# Patient Record
Sex: FEMALE | Race: BLACK OR AFRICAN AMERICAN | NOT HISPANIC OR LATINO | Employment: OTHER | URBAN - METROPOLITAN AREA
[De-identification: names, ages, dates, MRNs, and addresses within clinical notes are randomized per-mention and may not be internally consistent; named-entity substitution may affect disease eponyms.]

---

## 2017-01-09 RX ORDER — HYDROCHLOROTHIAZIDE 25 MG/1
TABLET ORAL
Qty: 90 TABLET | Refills: 0 | Status: SHIPPED | OUTPATIENT
Start: 2017-01-09 | End: 2017-05-23 | Stop reason: SDUPTHER

## 2017-01-09 RX ORDER — AMLODIPINE BESYLATE 5 MG/1
TABLET ORAL
Qty: 90 TABLET | Refills: 0 | Status: SHIPPED | OUTPATIENT
Start: 2017-01-09 | End: 2017-05-23 | Stop reason: SDUPTHER

## 2017-01-13 ENCOUNTER — CLINICAL SUPPORT (OUTPATIENT)
Dept: INTERNAL MEDICINE | Facility: CLINIC | Age: 72
End: 2017-01-13

## 2017-01-13 DIAGNOSIS — Z23 NEED FOR INFLUENZA VACCINATION: Primary | ICD-10-CM

## 2017-01-13 PROCEDURE — 90662 IIV NO PRSV INCREASED AG IM: CPT | Performed by: INTERNAL MEDICINE

## 2017-01-13 PROCEDURE — G0008 ADMIN INFLUENZA VIRUS VAC: HCPCS | Performed by: INTERNAL MEDICINE

## 2017-02-23 ENCOUNTER — LAB (OUTPATIENT)
Dept: INTERNAL MEDICINE | Facility: CLINIC | Age: 72
End: 2017-02-23

## 2017-02-23 DIAGNOSIS — I10 ESSENTIAL HYPERTENSION, MALIGNANT: ICD-10-CM

## 2017-02-23 DIAGNOSIS — R73.09 IMPAIRED GLUCOSE TOLERANCE TEST: ICD-10-CM

## 2017-02-23 DIAGNOSIS — E78.5 HYPERLIPIDEMIA, UNSPECIFIED HYPERLIPIDEMIA TYPE: Primary | ICD-10-CM

## 2017-02-23 LAB
25(OH)D3 SERPL-MCNC: 44.3 NG/ML
ALBUMIN SERPL-MCNC: 4.2 G/DL (ref 3.2–4.8)
ALBUMIN/GLOB SERPL: 1.4 G/DL (ref 1.5–2.5)
ALP SERPL-CCNC: 55 U/L (ref 25–100)
ALT SERPL W P-5'-P-CCNC: 24 U/L (ref 7–40)
ANION GAP SERPL CALCULATED.3IONS-SCNC: 3 MMOL/L (ref 3–11)
ARTICHOKE IGE QN: 118 MG/DL (ref 0–130)
AST SERPL-CCNC: 19 U/L (ref 0–33)
BILIRUB SERPL-MCNC: 0.4 MG/DL (ref 0.3–1.2)
BUN BLD-MCNC: 17 MG/DL (ref 9–23)
BUN/CREAT SERPL: 18.9 (ref 7–25)
CALCIUM SPEC-SCNC: 9.8 MG/DL (ref 8.7–10.4)
CHLORIDE SERPL-SCNC: 103 MMOL/L (ref 99–109)
CHOLEST SERPL-MCNC: 183 MG/DL (ref 0–200)
CO2 SERPL-SCNC: 33 MMOL/L (ref 20–31)
CREAT BLD-MCNC: 0.9 MG/DL (ref 0.6–1.3)
DEPRECATED RDW RBC AUTO: 48.5 FL (ref 37–54)
ERYTHROCYTE [DISTWIDTH] IN BLOOD BY AUTOMATED COUNT: 16 % (ref 11.3–14.5)
GFR SERPL CREATININE-BSD FRML MDRD: 75 ML/MIN/1.73
GLOBULIN UR ELPH-MCNC: 2.9 GM/DL
GLUCOSE BLD-MCNC: 98 MG/DL (ref 70–100)
HBA1C MFR BLD: 6 % (ref 4.8–5.6)
HCT VFR BLD AUTO: 40.9 % (ref 34.5–44)
HDLC SERPL-MCNC: 60 MG/DL (ref 40–60)
HGB BLD-MCNC: 13.2 G/DL (ref 11.5–15.5)
MCH RBC QN AUTO: 26.8 PG (ref 27–31)
MCHC RBC AUTO-ENTMCNC: 32.3 G/DL (ref 32–36)
MCV RBC AUTO: 83 FL (ref 80–99)
PLATELET # BLD AUTO: 370 10*3/MM3 (ref 150–450)
PMV BLD AUTO: 8.9 FL (ref 6–12)
POTASSIUM BLD-SCNC: 3.9 MMOL/L (ref 3.5–5.5)
PROT SERPL-MCNC: 7.1 G/DL (ref 5.7–8.2)
RBC # BLD AUTO: 4.93 10*6/MM3 (ref 3.89–5.14)
SODIUM BLD-SCNC: 139 MMOL/L (ref 132–146)
TRIGL SERPL-MCNC: 53 MG/DL (ref 0–150)
WBC NRBC COR # BLD: 5.57 10*3/MM3 (ref 3.5–10.8)

## 2017-02-23 PROCEDURE — 82306 VITAMIN D 25 HYDROXY: CPT | Performed by: INTERNAL MEDICINE

## 2017-02-23 PROCEDURE — 80061 LIPID PANEL: CPT | Performed by: INTERNAL MEDICINE

## 2017-02-23 PROCEDURE — 83036 HEMOGLOBIN GLYCOSYLATED A1C: CPT | Performed by: INTERNAL MEDICINE

## 2017-02-23 PROCEDURE — 85027 COMPLETE CBC AUTOMATED: CPT | Performed by: INTERNAL MEDICINE

## 2017-02-23 PROCEDURE — 36415 COLL VENOUS BLD VENIPUNCTURE: CPT | Performed by: INTERNAL MEDICINE

## 2017-02-23 PROCEDURE — 80053 COMPREHEN METABOLIC PANEL: CPT | Performed by: INTERNAL MEDICINE

## 2017-02-28 ENCOUNTER — OFFICE VISIT (OUTPATIENT)
Dept: INTERNAL MEDICINE | Facility: CLINIC | Age: 72
End: 2017-02-28

## 2017-02-28 VITALS
HEART RATE: 60 BPM | SYSTOLIC BLOOD PRESSURE: 140 MMHG | HEIGHT: 62 IN | WEIGHT: 145 LBS | DIASTOLIC BLOOD PRESSURE: 70 MMHG | BODY MASS INDEX: 26.68 KG/M2

## 2017-02-28 DIAGNOSIS — E78.49 OTHER HYPERLIPIDEMIA: Primary | ICD-10-CM

## 2017-02-28 DIAGNOSIS — K21.9 GASTROESOPHAGEAL REFLUX DISEASE WITHOUT ESOPHAGITIS: ICD-10-CM

## 2017-02-28 DIAGNOSIS — R73.02 IMPAIRED GLUCOSE TOLERANCE: ICD-10-CM

## 2017-02-28 DIAGNOSIS — M81.0 OSTEOPOROSIS: ICD-10-CM

## 2017-02-28 DIAGNOSIS — G47.09 OTHER INSOMNIA: ICD-10-CM

## 2017-02-28 PROCEDURE — 99214 OFFICE O/P EST MOD 30 MIN: CPT | Performed by: INTERNAL MEDICINE

## 2017-02-28 RX ORDER — TRAZODONE HYDROCHLORIDE 50 MG/1
50 TABLET ORAL NIGHTLY PRN
Qty: 30 TABLET | Refills: 5 | Status: SHIPPED | OUTPATIENT
Start: 2017-02-28 | End: 2018-12-13

## 2017-02-28 RX ORDER — TRAZODONE HYDROCHLORIDE 50 MG/1
50 TABLET ORAL NIGHTLY PRN
Qty: 30 TABLET | Refills: 5 | Status: SHIPPED | OUTPATIENT
Start: 2017-02-28 | End: 2017-02-28 | Stop reason: SDUPTHER

## 2017-02-28 RX ORDER — MECLIZINE HYDROCHLORIDE 25 MG/1
TABLET ORAL
Refills: 0 | COMMUNITY
Start: 2017-01-31 | End: 2017-08-31

## 2017-02-28 NOTE — PROGRESS NOTES
Patient is a 71 y.o. female who is here for a follow up of chronic conditions.  Chief Complaint   Patient presents with   • Hypertension   • Hyperlipidemia         HPI:  Here for f/u.  Had a bout of dizziness that lasted about one day.  Onset after URI.  BP has been good.  No HA's.  No abdominal pains.  Energy level is good.  No ankle edema.  Sleep is not the best.  GERD is under control.     History:    Patient Active Problem List   Diagnosis   • Gastroesophageal reflux disease without esophagitis   • Impaired glucose tolerance   • Hyperlipidemia   • Hypertension   • Osteoporosis   • Asteatosis cutis   • Cough       No past medical history on file.    Past Surgical History   Procedure Laterality Date   • Hysterectomy  11/1993     and REG       Current Outpatient Prescriptions on File Prior to Visit   Medication Sig   • amLODIPine (NORVASC) 5 MG tablet TAKE 1 TABLET DAILY   • ammonium lactate (LAC-HYDRIN) 12 % lotion Apply  topically.   • aspirin 81 MG tablet Take  by mouth daily.   • hydrochlorothiazide (HYDRODIURIL) 25 MG tablet TAKE 1 TABLET DAILY   • omeprazole (PriLOSEC) 40 MG capsule daily.   • pravastatin (PRAVACHOL) 40 MG tablet TAKE 1 TABLET DAILY   • [DISCONTINUED] azithromycin (ZITHROMAX Z-CANDY) 250 MG tablet Take 2 tablets the first day, then 1 tablet daily for 4 days.   • [DISCONTINUED] benzonatate (TESSALON) 200 MG capsule Take 1 capsule by mouth 3 (Three) Times a Day As Needed for cough.     No current facility-administered medications on file prior to visit.        Family History   Problem Relation Age of Onset   • Diabetes Other    • Kidney disease Other    • Hypertension Other        Social History     Social History   • Marital status:      Spouse name: N/A   • Number of children: N/A   • Years of education: N/A     Occupational History   • Not on file.     Social History Main Topics   • Smoking status: Never Smoker   • Smokeless tobacco: Not on file   • Alcohol use Yes   • Drug use: Not on  "file   • Sexual activity: Not on file     Other Topics Concern   • Not on file     Social History Narrative         ROS:    Review of Systems   Constitutional: Negative for chills, diaphoresis, fatigue, fever and unexpected weight change.   HENT: Negative for congestion, ear pain, hearing loss, nosebleeds, postnasal drip, rhinorrhea, sinus pressure and sore throat.    Eyes: Negative for pain, discharge and itching.   Respiratory: Negative for cough, chest tightness, shortness of breath and wheezing.    Cardiovascular: Negative for chest pain, palpitations and leg swelling.   Gastrointestinal: Negative for abdominal distention, abdominal pain, blood in stool, constipation, diarrhea, nausea and vomiting.        1/13 colonoscopy normal   Endocrine: Negative for polydipsia and polyuria.   Genitourinary: Negative for difficulty urinating, dysuria, frequency and hematuria.        11/15 mammogram and followed by GYN   Musculoskeletal: Negative for arthralgias, back pain, gait problem, joint swelling and myalgias.   Skin: Negative for rash and wound.   Neurological: Positive for light-headedness. Negative for dizziness, syncope, weakness and headaches.   Psychiatric/Behavioral: Negative for dysphoric mood and sleep disturbance. The patient is not nervous/anxious.        Visit Vitals   • /70   • Pulse 60   • Ht 61.5\" (156.2 cm)   • Wt 145 lb (65.8 kg)   • BMI 26.95 kg/m2       Physical Exam:    Physical Exam   Constitutional: She is oriented to person, place, and time. She appears well-developed and well-nourished.   HENT:   Head: Normocephalic and atraumatic.   Right Ear: External ear normal.   Left Ear: External ear normal.   Mouth/Throat: Oropharynx is clear and moist.   Eyes: Conjunctivae and EOM are normal.   Neck: Normal range of motion. Neck supple.   Cardiovascular: Normal rate, regular rhythm and normal heart sounds.    Pulmonary/Chest: Effort normal and breath sounds normal.   Abdominal: Soft. Bowel sounds " are normal.   Musculoskeletal: Normal range of motion.   Lymphadenopathy:     She has no cervical adenopathy.   Neurological: She is alert and oriented to person, place, and time.   Skin: Skin is warm and dry.   Psychiatric: She has a normal mood and affect. Her behavior is normal. Thought content normal.       Procedure:      Discussion/Summary:  htn-stable  hyperlipidemia-labs noted  gerd-stable  glucose intolerance-labs reviewed, watch carbs  osteoporosis-dc calcium and cont vit D exercise  vaginal bleeding-resolved  xerosis-lachydrin prn  Insomnia-trazodone prn      labs noted and dw patient,      Current Outpatient Prescriptions:   •  amLODIPine (NORVASC) 5 MG tablet, TAKE 1 TABLET DAILY, Disp: 90 tablet, Rfl: 0  •  ammonium lactate (LAC-HYDRIN) 12 % lotion, Apply  topically., Disp: , Rfl:   •  aspirin 81 MG tablet, Take  by mouth daily., Disp: , Rfl:   •  hydrochlorothiazide (HYDRODIURIL) 25 MG tablet, TAKE 1 TABLET DAILY, Disp: 90 tablet, Rfl: 0  •  meclizine (ANTIVERT) 25 MG tablet, TK 1 T PO QID FOR 5 DAYS, Disp: , Rfl: 0  •  omeprazole (PriLOSEC) 40 MG capsule, daily., Disp: , Rfl:   •  pravastatin (PRAVACHOL) 40 MG tablet, TAKE 1 TABLET DAILY, Disp: 90 tablet, Rfl: 2  •  traZODone (DESYREL) 50 MG tablet, Take 1 tablet by mouth At Night As Needed for sleep., Disp: 30 tablet, Rfl: 5        Marleni was seen today for hypertension and hyperlipidemia.    Diagnoses and all orders for this visit:    Other hyperlipidemia    Gastroesophageal reflux disease without esophagitis    Impaired glucose tolerance    Osteoporosis    Other insomnia  -     Discontinue: traZODone (DESYREL) 50 MG tablet; Take 1 tablet by mouth At Night As Needed for sleep.  -     traZODone (DESYREL) 50 MG tablet; Take 1 tablet by mouth At Night As Needed for sleep.

## 2017-03-10 RX ORDER — BENZONATATE 200 MG/1
200 CAPSULE ORAL 3 TIMES DAILY PRN
Qty: 30 CAPSULE | Refills: 1 | Status: SHIPPED | OUTPATIENT
Start: 2017-03-10 | End: 2017-08-31

## 2017-03-10 RX ORDER — AZITHROMYCIN 250 MG/1
TABLET, FILM COATED ORAL
Qty: 6 TABLET | Refills: 0 | Status: SHIPPED | OUTPATIENT
Start: 2017-03-10 | End: 2017-08-31

## 2017-05-23 RX ORDER — AMLODIPINE BESYLATE 5 MG/1
5 TABLET ORAL DAILY
Qty: 14 TABLET | Refills: 0 | Status: SHIPPED | OUTPATIENT
Start: 2017-05-23 | End: 2017-05-23 | Stop reason: SDUPTHER

## 2017-05-23 RX ORDER — HYDROCHLOROTHIAZIDE 25 MG/1
25 TABLET ORAL DAILY
Qty: 90 TABLET | Refills: 2 | Status: SHIPPED | OUTPATIENT
Start: 2017-05-23 | End: 2018-02-18 | Stop reason: SDUPTHER

## 2017-05-23 RX ORDER — HYDROCHLOROTHIAZIDE 25 MG/1
25 TABLET ORAL DAILY
Qty: 14 TABLET | Refills: 0 | Status: SHIPPED | OUTPATIENT
Start: 2017-05-23 | End: 2017-05-23 | Stop reason: SDUPTHER

## 2017-05-23 RX ORDER — AMLODIPINE BESYLATE 5 MG/1
5 TABLET ORAL DAILY
Qty: 90 TABLET | Refills: 2 | Status: SHIPPED | OUTPATIENT
Start: 2017-05-23 | End: 2018-02-18 | Stop reason: SDUPTHER

## 2017-07-10 RX ORDER — OMEPRAZOLE 40 MG/1
CAPSULE, DELAYED RELEASE ORAL
Qty: 90 CAPSULE | Refills: 1 | Status: SHIPPED | OUTPATIENT
Start: 2017-07-10 | End: 2019-04-18 | Stop reason: SDUPTHER

## 2017-08-31 ENCOUNTER — OFFICE VISIT (OUTPATIENT)
Dept: INTERNAL MEDICINE | Facility: CLINIC | Age: 72
End: 2017-08-31

## 2017-08-31 VITALS
DIASTOLIC BLOOD PRESSURE: 70 MMHG | WEIGHT: 140 LBS | SYSTOLIC BLOOD PRESSURE: 120 MMHG | BODY MASS INDEX: 25.76 KG/M2 | HEART RATE: 68 BPM | HEIGHT: 62 IN

## 2017-08-31 DIAGNOSIS — E78.00 PURE HYPERCHOLESTEROLEMIA: ICD-10-CM

## 2017-08-31 DIAGNOSIS — Z00.00 ROUTINE GENERAL MEDICAL EXAMINATION AT A HEALTH CARE FACILITY: ICD-10-CM

## 2017-08-31 DIAGNOSIS — R73.02 IMPAIRED GLUCOSE TOLERANCE: ICD-10-CM

## 2017-08-31 DIAGNOSIS — I10 ESSENTIAL HYPERTENSION: ICD-10-CM

## 2017-08-31 DIAGNOSIS — M81.0 OSTEOPOROSIS: ICD-10-CM

## 2017-08-31 DIAGNOSIS — K21.9 GASTROESOPHAGEAL REFLUX DISEASE WITHOUT ESOPHAGITIS: Primary | ICD-10-CM

## 2017-08-31 LAB
25(OH)D3 SERPL-MCNC: 32.5 NG/ML
ALBUMIN SERPL-MCNC: 4.2 G/DL (ref 3.2–4.8)
ALBUMIN/GLOB SERPL: 1.4 G/DL (ref 1.5–2.5)
ALP SERPL-CCNC: 66 U/L (ref 25–100)
ALT SERPL W P-5'-P-CCNC: 22 U/L (ref 7–40)
ANION GAP SERPL CALCULATED.3IONS-SCNC: 7 MMOL/L (ref 3–11)
ARTICHOKE IGE QN: 111 MG/DL (ref 0–130)
AST SERPL-CCNC: 21 U/L (ref 0–33)
BILIRUB SERPL-MCNC: 0.5 MG/DL (ref 0.3–1.2)
BUN BLD-MCNC: 13 MG/DL (ref 9–23)
BUN/CREAT SERPL: 14.4 (ref 7–25)
CALCIUM SPEC-SCNC: 9.5 MG/DL (ref 8.7–10.4)
CHLORIDE SERPL-SCNC: 108 MMOL/L (ref 99–109)
CHOLEST SERPL-MCNC: 178 MG/DL (ref 0–200)
CO2 SERPL-SCNC: 28 MMOL/L (ref 20–31)
CREAT BLD-MCNC: 0.9 MG/DL (ref 0.6–1.3)
DEPRECATED RDW RBC AUTO: 49 FL (ref 37–54)
ERYTHROCYTE [DISTWIDTH] IN BLOOD BY AUTOMATED COUNT: 16.2 % (ref 11.3–14.5)
GFR SERPL CREATININE-BSD FRML MDRD: 75 ML/MIN/1.73
GLOBULIN UR ELPH-MCNC: 2.9 GM/DL
GLUCOSE BLD-MCNC: 86 MG/DL (ref 70–100)
HBA1C MFR BLD: 6 % (ref 4.8–5.6)
HCT VFR BLD AUTO: 38.3 % (ref 34.5–44)
HDLC SERPL-MCNC: 58 MG/DL (ref 40–60)
HGB BLD-MCNC: 12.5 G/DL (ref 11.5–15.5)
MCH RBC QN AUTO: 26.8 PG (ref 27–31)
MCHC RBC AUTO-ENTMCNC: 32.6 G/DL (ref 32–36)
MCV RBC AUTO: 82.2 FL (ref 80–99)
PLATELET # BLD AUTO: 342 10*3/MM3 (ref 150–450)
PMV BLD AUTO: 8.9 FL (ref 6–12)
POTASSIUM BLD-SCNC: 3.9 MMOL/L (ref 3.5–5.5)
PROT SERPL-MCNC: 7.1 G/DL (ref 5.7–8.2)
RBC # BLD AUTO: 4.66 10*6/MM3 (ref 3.89–5.14)
SODIUM BLD-SCNC: 143 MMOL/L (ref 132–146)
TRIGL SERPL-MCNC: 49 MG/DL (ref 0–150)
TSH SERPL DL<=0.05 MIU/L-ACNC: 1.12 MIU/ML (ref 0.35–5.35)
VIT B12 BLD-MCNC: 1274 PG/ML (ref 211–911)
WBC NRBC COR # BLD: 6.86 10*3/MM3 (ref 3.5–10.8)

## 2017-08-31 PROCEDURE — 99214 OFFICE O/P EST MOD 30 MIN: CPT | Performed by: INTERNAL MEDICINE

## 2017-08-31 PROCEDURE — 80061 LIPID PANEL: CPT | Performed by: INTERNAL MEDICINE

## 2017-08-31 PROCEDURE — 82607 VITAMIN B-12: CPT | Performed by: INTERNAL MEDICINE

## 2017-08-31 PROCEDURE — 84443 ASSAY THYROID STIM HORMONE: CPT | Performed by: INTERNAL MEDICINE

## 2017-08-31 PROCEDURE — 82306 VITAMIN D 25 HYDROXY: CPT | Performed by: INTERNAL MEDICINE

## 2017-08-31 PROCEDURE — 83036 HEMOGLOBIN GLYCOSYLATED A1C: CPT | Performed by: INTERNAL MEDICINE

## 2017-08-31 PROCEDURE — 80053 COMPREHEN METABOLIC PANEL: CPT | Performed by: INTERNAL MEDICINE

## 2017-08-31 PROCEDURE — 36415 COLL VENOUS BLD VENIPUNCTURE: CPT | Performed by: INTERNAL MEDICINE

## 2017-08-31 PROCEDURE — 85027 COMPLETE CBC AUTOMATED: CPT | Performed by: INTERNAL MEDICINE

## 2017-10-30 RX ORDER — PRAVASTATIN SODIUM 40 MG
TABLET ORAL
Qty: 90 TABLET | Refills: 2 | Status: SHIPPED | OUTPATIENT
Start: 2017-10-30 | End: 2019-04-18 | Stop reason: SDUPTHER

## 2018-01-04 ENCOUNTER — OFFICE VISIT (OUTPATIENT)
Dept: INTERNAL MEDICINE | Facility: CLINIC | Age: 73
End: 2018-01-04

## 2018-01-04 VITALS
DIASTOLIC BLOOD PRESSURE: 62 MMHG | WEIGHT: 145 LBS | BODY MASS INDEX: 27.38 KG/M2 | SYSTOLIC BLOOD PRESSURE: 126 MMHG | HEIGHT: 61 IN | HEART RATE: 64 BPM

## 2018-01-04 DIAGNOSIS — I10 ESSENTIAL HYPERTENSION: ICD-10-CM

## 2018-01-04 DIAGNOSIS — K21.9 GASTROESOPHAGEAL REFLUX DISEASE WITHOUT ESOPHAGITIS: ICD-10-CM

## 2018-01-04 DIAGNOSIS — E78.49 OTHER HYPERLIPIDEMIA: Primary | ICD-10-CM

## 2018-01-04 DIAGNOSIS — Z23 NEED FOR IMMUNIZATION AGAINST INFLUENZA: ICD-10-CM

## 2018-01-04 DIAGNOSIS — R73.02 IMPAIRED GLUCOSE TOLERANCE: ICD-10-CM

## 2018-01-04 DIAGNOSIS — M81.8 OTHER OSTEOPOROSIS WITHOUT CURRENT PATHOLOGICAL FRACTURE: ICD-10-CM

## 2018-01-04 PROCEDURE — G0008 ADMIN INFLUENZA VIRUS VAC: HCPCS | Performed by: INTERNAL MEDICINE

## 2018-01-04 PROCEDURE — 90662 IIV NO PRSV INCREASED AG IM: CPT | Performed by: INTERNAL MEDICINE

## 2018-01-04 PROCEDURE — 99214 OFFICE O/P EST MOD 30 MIN: CPT | Performed by: INTERNAL MEDICINE

## 2018-01-04 NOTE — PROGRESS NOTES
Patient is a 72 y.o. female who is here for a follow up of chronic conditions.  Chief Complaint   Patient presents with   • Hypertension   • Hyperlipidemia         HPI:  Here for f/u.  Doing ok.  Energy level is good.  No edema.  No dizziness or lightheadedness.  No abdominal pains.  GERD controlled on prn pppi.  Sleep is good.  No nocturia.     History:    Patient Active Problem List   Diagnosis   • Gastroesophageal reflux disease without esophagitis   • Impaired glucose tolerance   • Hyperlipidemia   • Hypertension   • Osteoporosis   • Asteatosis cutis   • Cough       No past medical history on file.    Past Surgical History:   Procedure Laterality Date   • HYSTERECTOMY  11/1993    and REG       Current Outpatient Prescriptions on File Prior to Visit   Medication Sig   • amLODIPine (NORVASC) 5 MG tablet Take 1 tablet by mouth Daily.   • ammonium lactate (LAC-HYDRIN) 12 % lotion Apply  topically.   • aspirin 81 MG tablet Take  by mouth daily.   • hydrochlorothiazide (HYDRODIURIL) 25 MG tablet Take 1 tablet by mouth Daily.   • omeprazole (priLOSEC) 40 MG capsule TAKE 1 CAPSULE DAILY   • pravastatin (PRAVACHOL) 40 MG tablet TAKE 1 TABLET DAILY   • traZODone (DESYREL) 50 MG tablet Take 1 tablet by mouth At Night As Needed for sleep.     No current facility-administered medications on file prior to visit.        Family History   Problem Relation Age of Onset   • Diabetes Other    • Kidney disease Other    • Hypertension Other        Social History     Social History   • Marital status:      Spouse name: N/A   • Number of children: N/A   • Years of education: N/A     Occupational History   • Not on file.     Social History Main Topics   • Smoking status: Never Smoker   • Smokeless tobacco: Not on file   • Alcohol use Yes   • Drug use: Not on file   • Sexual activity: Not on file     Other Topics Concern   • Not on file     Social History Narrative         ROS:    Review of Systems   Constitutional: Negative for  "chills, diaphoresis, fatigue, fever and unexpected weight change.   HENT: Negative for congestion, ear pain, hearing loss, nosebleeds, postnasal drip, rhinorrhea, sinus pressure and sore throat.    Eyes: Negative for pain, discharge and itching.   Respiratory: Negative for cough, chest tightness, shortness of breath and wheezing.    Cardiovascular: Negative for chest pain, palpitations and leg swelling.   Gastrointestinal: Negative for abdominal distention, abdominal pain, blood in stool, constipation, diarrhea, nausea and vomiting.        1/13 colonoscopy normal   Endocrine: Negative for polydipsia and polyuria.   Genitourinary: Negative for difficulty urinating, dysuria, frequency and hematuria.        12/17 mammogram and followed by GYN   Musculoskeletal: Negative for arthralgias, back pain, gait problem, joint swelling and myalgias.   Skin: Negative for rash and wound.   Neurological: Negative for dizziness, syncope, weakness and headaches.   Psychiatric/Behavioral: Negative for dysphoric mood and sleep disturbance. The patient is not nervous/anxious.        /62  Pulse 64  Ht 156.2 cm (61.5\")  Wt 65.8 kg (145 lb)  BMI 26.96 kg/m2    Physical Exam:    Physical Exam   Constitutional: She is oriented to person, place, and time. She appears well-developed and well-nourished.   HENT:   Head: Normocephalic and atraumatic.   Right Ear: External ear normal.   Left Ear: External ear normal.   Mouth/Throat: Oropharynx is clear and moist.   Eyes: Conjunctivae and EOM are normal.   Neck: Normal range of motion. Neck supple.   Cardiovascular: Normal rate, regular rhythm and normal heart sounds.    Pulmonary/Chest: Effort normal and breath sounds normal.   Abdominal: Soft. Bowel sounds are normal.   Musculoskeletal: Normal range of motion.   Lymphadenopathy:     She has no cervical adenopathy.   Neurological: She is alert and oriented to person, place, and time.   Skin: Skin is warm and dry.   Psychiatric: She has a " normal mood and affect. Her behavior is normal. Thought content normal.       Procedure:      Discussion/Summary:    htn-stable  hyperlipidemia-labs on rtc  gerd-stable  glucose intolerance-labs reviewed, watch carbs  osteoporosis-dc calcium and cont vit D exercise  vaginal bleeding-resolved  xerosis-lachydrin prn  Insomnia-trazodone prn      labs noted and dw patient  Flu shot today      Current Outpatient Prescriptions:   •  amLODIPine (NORVASC) 5 MG tablet, Take 1 tablet by mouth Daily., Disp: 90 tablet, Rfl: 2  •  ammonium lactate (LAC-HYDRIN) 12 % lotion, Apply  topically., Disp: , Rfl:   •  aspirin 81 MG tablet, Take  by mouth daily., Disp: , Rfl:   •  hydrochlorothiazide (HYDRODIURIL) 25 MG tablet, Take 1 tablet by mouth Daily., Disp: 90 tablet, Rfl: 2  •  omeprazole (priLOSEC) 40 MG capsule, TAKE 1 CAPSULE DAILY, Disp: 90 capsule, Rfl: 1  •  pravastatin (PRAVACHOL) 40 MG tablet, TAKE 1 TABLET DAILY, Disp: 90 tablet, Rfl: 2  •  traZODone (DESYREL) 50 MG tablet, Take 1 tablet by mouth At Night As Needed for sleep., Disp: 30 tablet, Rfl: 5        Marleni was seen today for hypertension and hyperlipidemia.    Diagnoses and all orders for this visit:    Other hyperlipidemia    Essential hypertension    Gastroesophageal reflux disease without esophagitis    Impaired glucose tolerance    Other osteoporosis without current pathological fracture    Need for immunization against influenza  -     Flu Vaccine High Dose PF 65YR+ ([id number])

## 2018-02-19 RX ORDER — HYDROCHLOROTHIAZIDE 25 MG/1
TABLET ORAL
Qty: 90 TABLET | Refills: 2 | Status: SHIPPED | OUTPATIENT
Start: 2018-02-19 | End: 2018-11-18 | Stop reason: SDUPTHER

## 2018-02-19 RX ORDER — AMLODIPINE BESYLATE 5 MG/1
TABLET ORAL
Qty: 90 TABLET | Refills: 2 | Status: SHIPPED | OUTPATIENT
Start: 2018-02-19 | End: 2018-11-18 | Stop reason: SDUPTHER

## 2018-05-03 ENCOUNTER — LAB (OUTPATIENT)
Dept: INTERNAL MEDICINE | Facility: CLINIC | Age: 73
End: 2018-05-03

## 2018-05-03 DIAGNOSIS — E55.9 VITAMIN D DEFICIENCY: ICD-10-CM

## 2018-05-03 DIAGNOSIS — K21.9 GASTROESOPHAGEAL REFLUX DISEASE WITHOUT ESOPHAGITIS: Primary | ICD-10-CM

## 2018-05-03 DIAGNOSIS — Z00.00 ROUTINE GENERAL MEDICAL EXAMINATION AT A HEALTH CARE FACILITY: ICD-10-CM

## 2018-05-03 DIAGNOSIS — M80.00XD OSTEOPOROSIS WITH CURRENT PATHOLOGICAL FRACTURE WITH ROUTINE HEALING, UNSPECIFIED OSTEOPOROSIS TYPE, SUBSEQUENT ENCOUNTER: ICD-10-CM

## 2018-05-03 DIAGNOSIS — E78.2 MIXED HYPERLIPIDEMIA: ICD-10-CM

## 2018-05-03 DIAGNOSIS — I10 ESSENTIAL HYPERTENSION: ICD-10-CM

## 2018-05-03 DIAGNOSIS — R73.02 IMPAIRED GLUCOSE TOLERANCE: ICD-10-CM

## 2018-05-03 LAB
25(OH)D3 SERPL-MCNC: 52.4 NG/ML
ALBUMIN SERPL-MCNC: 4.2 G/DL (ref 3.2–4.8)
ALBUMIN/GLOB SERPL: 1.6 G/DL (ref 1.5–2.5)
ALP SERPL-CCNC: 61 U/L (ref 25–100)
ALT SERPL W P-5'-P-CCNC: 21 U/L (ref 7–40)
ANION GAP SERPL CALCULATED.3IONS-SCNC: 9 MMOL/L (ref 3–11)
ARTICHOKE IGE QN: 107 MG/DL (ref 0–130)
AST SERPL-CCNC: 18 U/L (ref 0–33)
BILIRUB SERPL-MCNC: 0.6 MG/DL (ref 0.3–1.2)
BUN BLD-MCNC: 20 MG/DL (ref 9–23)
BUN/CREAT SERPL: 20 (ref 7–25)
CALCIUM SPEC-SCNC: 9.4 MG/DL (ref 8.7–10.4)
CHLORIDE SERPL-SCNC: 100 MMOL/L (ref 99–109)
CHOLEST SERPL-MCNC: 177 MG/DL (ref 0–200)
CO2 SERPL-SCNC: 28 MMOL/L (ref 20–31)
CREAT BLD-MCNC: 1 MG/DL (ref 0.6–1.3)
DEPRECATED RDW RBC AUTO: 49.6 FL (ref 37–54)
ERYTHROCYTE [DISTWIDTH] IN BLOOD BY AUTOMATED COUNT: 16.4 % (ref 11.3–14.5)
GFR SERPL CREATININE-BSD FRML MDRD: 66 ML/MIN/1.73
GLOBULIN UR ELPH-MCNC: 2.7 GM/DL
GLUCOSE BLD-MCNC: 98 MG/DL (ref 70–100)
HBA1C MFR BLD: 6.3 % (ref 4.8–5.6)
HCT VFR BLD AUTO: 40.2 % (ref 34.5–44)
HDLC SERPL-MCNC: 58 MG/DL (ref 40–60)
HGB BLD-MCNC: 12.9 G/DL (ref 11.5–15.5)
MCH RBC QN AUTO: 26.5 PG (ref 27–31)
MCHC RBC AUTO-ENTMCNC: 32.1 G/DL (ref 32–36)
MCV RBC AUTO: 82.7 FL (ref 80–99)
PLATELET # BLD AUTO: 350 10*3/MM3 (ref 150–450)
PMV BLD AUTO: 9.3 FL (ref 6–12)
POTASSIUM BLD-SCNC: 3.8 MMOL/L (ref 3.5–5.5)
PROT SERPL-MCNC: 6.9 G/DL (ref 5.7–8.2)
RBC # BLD AUTO: 4.86 10*6/MM3 (ref 3.89–5.14)
SODIUM BLD-SCNC: 137 MMOL/L (ref 132–146)
TRIGL SERPL-MCNC: 50 MG/DL (ref 0–150)
TSH SERPL DL<=0.05 MIU/L-ACNC: 1.31 MIU/ML (ref 0.35–5.35)
VIT B12 BLD-MCNC: >2000 PG/ML (ref 211–911)
WBC NRBC COR # BLD: 6.9 10*3/MM3 (ref 3.5–10.8)

## 2018-05-03 PROCEDURE — 83036 HEMOGLOBIN GLYCOSYLATED A1C: CPT | Performed by: INTERNAL MEDICINE

## 2018-05-03 PROCEDURE — 84443 ASSAY THYROID STIM HORMONE: CPT | Performed by: INTERNAL MEDICINE

## 2018-05-03 PROCEDURE — 82306 VITAMIN D 25 HYDROXY: CPT | Performed by: INTERNAL MEDICINE

## 2018-05-03 PROCEDURE — 80061 LIPID PANEL: CPT | Performed by: INTERNAL MEDICINE

## 2018-05-03 PROCEDURE — 85027 COMPLETE CBC AUTOMATED: CPT | Performed by: INTERNAL MEDICINE

## 2018-05-03 PROCEDURE — 82607 VITAMIN B-12: CPT | Performed by: INTERNAL MEDICINE

## 2018-05-03 PROCEDURE — 80053 COMPREHEN METABOLIC PANEL: CPT | Performed by: INTERNAL MEDICINE

## 2018-05-04 ENCOUNTER — OFFICE VISIT (OUTPATIENT)
Dept: INTERNAL MEDICINE | Facility: CLINIC | Age: 73
End: 2018-05-04

## 2018-05-04 VITALS
WEIGHT: 142 LBS | DIASTOLIC BLOOD PRESSURE: 60 MMHG | HEART RATE: 72 BPM | SYSTOLIC BLOOD PRESSURE: 130 MMHG | BODY MASS INDEX: 26.81 KG/M2 | HEIGHT: 61 IN

## 2018-05-04 DIAGNOSIS — R73.02 IMPAIRED GLUCOSE TOLERANCE: ICD-10-CM

## 2018-05-04 DIAGNOSIS — I10 ESSENTIAL HYPERTENSION: ICD-10-CM

## 2018-05-04 DIAGNOSIS — K21.9 GASTROESOPHAGEAL REFLUX DISEASE WITHOUT ESOPHAGITIS: ICD-10-CM

## 2018-05-04 DIAGNOSIS — M80.00XD OSTEOPOROSIS WITH CURRENT PATHOLOGICAL FRACTURE WITH ROUTINE HEALING, UNSPECIFIED OSTEOPOROSIS TYPE, SUBSEQUENT ENCOUNTER: ICD-10-CM

## 2018-05-04 DIAGNOSIS — E78.2 MIXED HYPERLIPIDEMIA: Primary | ICD-10-CM

## 2018-05-04 PROCEDURE — 99214 OFFICE O/P EST MOD 30 MIN: CPT | Performed by: INTERNAL MEDICINE

## 2018-05-04 NOTE — PROGRESS NOTES
Patient is a 72 y.o. female who is here for a follow up of chronic conditions.  Chief Complaint   Patient presents with   • Hypertension   • Hyperlipidemia         HPI:    Here for f/u.  Doing good.  No dizziness or lightheadedness.  No HA's.  No abdominal pains.  Occasional ezio horses at night.  GERD is controlled.  No abdominal pains.    History:    Patient Active Problem List   Diagnosis   • Gastroesophageal reflux disease without esophagitis   • Impaired glucose tolerance   • Hyperlipidemia   • Hypertension   • Osteoporosis   • Asteatosis cutis   • Cough       No past medical history on file.    Past Surgical History:   Procedure Laterality Date   • HYSTERECTOMY  11/1993    and REG       Current Outpatient Prescriptions on File Prior to Visit   Medication Sig   • amLODIPine (NORVASC) 5 MG tablet TAKE 1 TABLET DAILY   • ammonium lactate (LAC-HYDRIN) 12 % lotion Apply  topically.   • aspirin 81 MG tablet Take  by mouth daily.   • hydrochlorothiazide (HYDRODIURIL) 25 MG tablet TAKE 1 TABLET DAILY   • omeprazole (priLOSEC) 40 MG capsule TAKE 1 CAPSULE DAILY   • pravastatin (PRAVACHOL) 40 MG tablet TAKE 1 TABLET DAILY   • traZODone (DESYREL) 50 MG tablet Take 1 tablet by mouth At Night As Needed for sleep.     No current facility-administered medications on file prior to visit.        Family History   Problem Relation Age of Onset   • Diabetes Other    • Kidney disease Other    • Hypertension Other        Social History     Social History   • Marital status:      Spouse name: N/A   • Number of children: N/A   • Years of education: N/A     Occupational History   • Not on file.     Social History Main Topics   • Smoking status: Never Smoker   • Smokeless tobacco: Not on file   • Alcohol use Yes   • Drug use: Unknown   • Sexual activity: Not on file     Other Topics Concern   • Not on file     Social History Narrative   • No narrative on file         ROS:    Review of Systems   Constitutional: Negative for  "chills, diaphoresis, fatigue, fever and unexpected weight change.   HENT: Negative for congestion, ear pain, hearing loss, nosebleeds, postnasal drip, rhinorrhea, sinus pressure and sore throat.    Eyes: Negative for pain, discharge and itching.   Respiratory: Negative for cough, chest tightness, shortness of breath and wheezing.    Cardiovascular: Negative for chest pain, palpitations and leg swelling.   Gastrointestinal: Negative for abdominal distention, abdominal pain, blood in stool, constipation, diarrhea, nausea and vomiting.        1/13 colonoscopy normal   Endocrine: Negative for polydipsia and polyuria.   Genitourinary: Negative for difficulty urinating, dysuria, frequency and hematuria.        12/17 mammogram and followed by GYN   Musculoskeletal: Negative for arthralgias, back pain, gait problem, joint swelling and myalgias.   Skin: Negative for rash and wound.   Neurological: Negative for dizziness, syncope, weakness and headaches.   Psychiatric/Behavioral: Negative for dysphoric mood and sleep disturbance. The patient is not nervous/anxious.        /60   Pulse 72   Ht 156.2 cm (61.5\")   Wt 64.4 kg (142 lb)   BMI 26.40 kg/m²     Physical Exam:    Physical Exam   Constitutional: She is oriented to person, place, and time. She appears well-developed and well-nourished.   HENT:   Head: Normocephalic and atraumatic.   Right Ear: External ear normal.   Left Ear: External ear normal.   Mouth/Throat: Oropharynx is clear and moist.   Eyes: Conjunctivae and EOM are normal.   Neck: Normal range of motion. Neck supple.   Cardiovascular: Normal rate, regular rhythm and normal heart sounds.    Pulmonary/Chest: Effort normal and breath sounds normal.   Abdominal: Soft. Bowel sounds are normal.   Musculoskeletal: Normal range of motion.   Lymphadenopathy:     She has no cervical adenopathy.   Neurological: She is alert and oriented to person, place, and time.   Skin: Skin is warm and dry.   Psychiatric: She " has a normal mood and affect. Her behavior is normal. Thought content normal.       Procedure:      Discussion/Summary:    htn-stable  hyperlipidemia-labs noted  gerd-stable  glucose intolerance-labs noted, watch carbs  osteoporosis-dc calcium and cont vit D and exercise  vaginal bleeding-resolved  xerosis-lachydrin prn  Insomnia-trazodone prn      labs noted and dw patient      Current Outpatient Prescriptions:   •  amLODIPine (NORVASC) 5 MG tablet, TAKE 1 TABLET DAILY, Disp: 90 tablet, Rfl: 2  •  ammonium lactate (LAC-HYDRIN) 12 % lotion, Apply  topically., Disp: , Rfl:   •  aspirin 81 MG tablet, Take  by mouth daily., Disp: , Rfl:   •  hydrochlorothiazide (HYDRODIURIL) 25 MG tablet, TAKE 1 TABLET DAILY, Disp: 90 tablet, Rfl: 2  •  omeprazole (priLOSEC) 40 MG capsule, TAKE 1 CAPSULE DAILY, Disp: 90 capsule, Rfl: 1  •  pravastatin (PRAVACHOL) 40 MG tablet, TAKE 1 TABLET DAILY, Disp: 90 tablet, Rfl: 2  •  traZODone (DESYREL) 50 MG tablet, Take 1 tablet by mouth At Night As Needed for sleep., Disp: 30 tablet, Rfl: 5        Marleni was seen today for hypertension and hyperlipidemia.    Diagnoses and all orders for this visit:    Mixed hyperlipidemia    Essential hypertension    Gastroesophageal reflux disease without esophagitis    Impaired glucose tolerance    Osteoporosis with current pathological fracture with routine healing, unspecified osteoporosis type, subsequent encounter

## 2018-09-04 ENCOUNTER — OFFICE VISIT (OUTPATIENT)
Dept: INTERNAL MEDICINE | Facility: CLINIC | Age: 73
End: 2018-09-04

## 2018-09-04 VITALS
HEART RATE: 72 BPM | BODY MASS INDEX: 27.38 KG/M2 | SYSTOLIC BLOOD PRESSURE: 128 MMHG | WEIGHT: 145 LBS | HEIGHT: 61 IN | DIASTOLIC BLOOD PRESSURE: 70 MMHG

## 2018-09-04 DIAGNOSIS — E78.2 MIXED HYPERLIPIDEMIA: Primary | ICD-10-CM

## 2018-09-04 DIAGNOSIS — K21.9 GASTROESOPHAGEAL REFLUX DISEASE WITHOUT ESOPHAGITIS: ICD-10-CM

## 2018-09-04 DIAGNOSIS — M80.00XD OSTEOPOROSIS WITH CURRENT PATHOLOGICAL FRACTURE WITH ROUTINE HEALING, UNSPECIFIED OSTEOPOROSIS TYPE, SUBSEQUENT ENCOUNTER: ICD-10-CM

## 2018-09-04 DIAGNOSIS — I10 ESSENTIAL HYPERTENSION: ICD-10-CM

## 2018-09-04 DIAGNOSIS — R73.02 IMPAIRED GLUCOSE TOLERANCE: ICD-10-CM

## 2018-09-04 DIAGNOSIS — R20.2 ARM PARESTHESIA, LEFT: ICD-10-CM

## 2018-09-04 PROCEDURE — 99214 OFFICE O/P EST MOD 30 MIN: CPT | Performed by: INTERNAL MEDICINE

## 2018-09-04 NOTE — PROGRESS NOTES
"Patient is a 73 y.o. female who is here for a follow up of chronic conditions.  Chief Complaint   Patient presents with   • Hypertension   • Hyperlipidemia         HPI:    Here for f/u.  Energy level is good.  Today c/o back pains and seeing her chiropractor.  Some paresthesia in left UE.  No weakness.  As if something is \"crawling\" up her arm.  At times worst in 4th and 5th digit.    BP has been good.  No dizziness or lightheadedness.  No HA's.  No abdominal pains.   History:    Patient Active Problem List   Diagnosis   • Gastroesophageal reflux disease without esophagitis   • Impaired glucose tolerance   • Hyperlipidemia   • Hypertension   • Osteoporosis   • Asteatosis cutis   • Cough   • Arm paresthesia, left       No past medical history on file.    Past Surgical History:   Procedure Laterality Date   • HYSTERECTOMY  11/1993    and REG       Current Outpatient Prescriptions on File Prior to Visit   Medication Sig   • amLODIPine (NORVASC) 5 MG tablet TAKE 1 TABLET DAILY   • ammonium lactate (LAC-HYDRIN) 12 % lotion Apply  topically.   • aspirin 81 MG tablet Take  by mouth daily.   • hydrochlorothiazide (HYDRODIURIL) 25 MG tablet TAKE 1 TABLET DAILY   • omeprazole (priLOSEC) 40 MG capsule TAKE 1 CAPSULE DAILY   • pravastatin (PRAVACHOL) 40 MG tablet TAKE 1 TABLET DAILY   • traZODone (DESYREL) 50 MG tablet Take 1 tablet by mouth At Night As Needed for sleep.     No current facility-administered medications on file prior to visit.        Family History   Problem Relation Age of Onset   • Diabetes Other    • Kidney disease Other    • Hypertension Other        Social History     Social History   • Marital status:      Spouse name: N/A   • Number of children: N/A   • Years of education: N/A     Occupational History   • Not on file.     Social History Main Topics   • Smoking status: Never Smoker   • Smokeless tobacco: Not on file   • Alcohol use Yes   • Drug use: Unknown   • Sexual activity: Not on file     Other " "Topics Concern   • Not on file     Social History Narrative   • No narrative on file         Review of Systems   Constitutional: Negative for chills, diaphoresis, fatigue, fever and unexpected weight change.   HENT: Negative for congestion, ear pain, hearing loss, nosebleeds, postnasal drip, rhinorrhea, sinus pressure and sore throat.    Eyes: Negative for pain, discharge and itching.   Respiratory: Negative for cough, chest tightness, shortness of breath and wheezing.    Cardiovascular: Negative for chest pain, palpitations and leg swelling.   Gastrointestinal: Negative for abdominal distention, abdominal pain, blood in stool, constipation, diarrhea, nausea and vomiting.        1/13 colonoscopy normal   Endocrine: Negative for polydipsia and polyuria.   Genitourinary: Negative for difficulty urinating, dysuria, frequency and hematuria.        12/17 mammogram and followed by GYN   Musculoskeletal: Negative for arthralgias, back pain, gait problem, joint swelling and myalgias.   Skin: Negative for rash and wound.   Neurological: Positive for numbness. Negative for dizziness, syncope, weakness and headaches.   Psychiatric/Behavioral: Negative for dysphoric mood and sleep disturbance. The patient is not nervous/anxious.        /70   Pulse 72   Ht 156.2 cm (61.5\")   Wt 65.8 kg (145 lb)   BMI 26.96 kg/m²       Physical Exam   Constitutional: She is oriented to person, place, and time. She appears well-developed and well-nourished.   HENT:   Head: Normocephalic and atraumatic.   Right Ear: External ear normal.   Left Ear: External ear normal.   Mouth/Throat: Oropharynx is clear and moist.   Eyes: Conjunctivae and EOM are normal.   Neck: Normal range of motion. Neck supple.   Cardiovascular: Normal rate, regular rhythm and normal heart sounds.    Pulmonary/Chest: Effort normal and breath sounds normal.   Abdominal: Soft. Bowel sounds are normal.   Musculoskeletal: Normal range of motion.   Lymphadenopathy:     " She has no cervical adenopathy.   Neurological: She is alert and oriented to person, place, and time.   Skin: Skin is warm and dry.   Psychiatric: She has a normal mood and affect. Her behavior is normal. Thought content normal.       Procedure:      Discussion/Summary:    htn-stable  hyperlipidemia-labs noted  gerd-stable  glucose intolerance-labs noted, watch carbs  osteoporosis-dc calcium and cont vit D and exercise  vaginal bleeding-resolved  xerosis-lachydrin prn  Insomnia-trazodone prn  Left arm paresthesia-will schedule EMG       labs noted and dw patient    Current Outpatient Prescriptions:   •  amLODIPine (NORVASC) 5 MG tablet, TAKE 1 TABLET DAILY, Disp: 90 tablet, Rfl: 2  •  ammonium lactate (LAC-HYDRIN) 12 % lotion, Apply  topically., Disp: , Rfl:   •  aspirin 81 MG tablet, Take  by mouth daily., Disp: , Rfl:   •  hydrochlorothiazide (HYDRODIURIL) 25 MG tablet, TAKE 1 TABLET DAILY, Disp: 90 tablet, Rfl: 2  •  omeprazole (priLOSEC) 40 MG capsule, TAKE 1 CAPSULE DAILY, Disp: 90 capsule, Rfl: 1  •  pravastatin (PRAVACHOL) 40 MG tablet, TAKE 1 TABLET DAILY, Disp: 90 tablet, Rfl: 2  •  traZODone (DESYREL) 50 MG tablet, Take 1 tablet by mouth At Night As Needed for sleep., Disp: 30 tablet, Rfl: 5        Marleni was seen today for hypertension and hyperlipidemia.    Diagnoses and all orders for this visit:    Mixed hyperlipidemia    Essential hypertension    Gastroesophageal reflux disease without esophagitis    Impaired glucose tolerance    Osteoporosis with current pathological fracture with routine healing, unspecified osteoporosis type, subsequent encounter    Arm paresthesia, left  -     Cancel: EMG & Nerve Conduction Test  -     EMG & Nerve Conduction Test

## 2018-09-26 ENCOUNTER — TELEPHONE (OUTPATIENT)
Dept: INTERNAL MEDICINE | Facility: CLINIC | Age: 73
End: 2018-09-26

## 2018-10-01 ENCOUNTER — TELEPHONE (OUTPATIENT)
Dept: INTERNAL MEDICINE | Facility: CLINIC | Age: 73
End: 2018-10-01

## 2018-10-01 NOTE — TELEPHONE ENCOUNTER
PATIENT IS NEEDING A LETTER STATING THAT SHE IS ABLE TO WORK WITH [employer] THAT HER HEALTH IS STABLE. FAX LETTER TO FRANSIS[facility] MATTHEW [phone]

## 2018-10-11 ENCOUNTER — HOSPITAL ENCOUNTER (OUTPATIENT)
Dept: NEUROLOGY | Facility: HOSPITAL | Age: 73
Discharge: HOME | End: 2018-10-11
Attending: INTERNAL MEDICINE | Admitting: INTERNAL MEDICINE

## 2018-10-11 PROBLEM — G56.22 ULNAR NEUROPATHY OF LEFT UPPER EXTREMITY: Status: ACTIVE | Noted: 2018-10-11

## 2018-10-11 PROCEDURE — 95886 MUSC TEST DONE W/N TEST COMP: CPT

## 2018-10-11 PROCEDURE — 95908 NRV CNDJ TST 3-4 STUDIES: CPT

## 2018-11-19 RX ORDER — HYDROCHLOROTHIAZIDE 25 MG/1
TABLET ORAL
Qty: 90 TABLET | Refills: 2 | Status: SHIPPED | OUTPATIENT
Start: 2018-11-19 | End: 2019-08-18 | Stop reason: SDUPTHER

## 2018-11-19 RX ORDER — AMLODIPINE BESYLATE 5 MG/1
TABLET ORAL
Qty: 90 TABLET | Refills: 2 | Status: SHIPPED | OUTPATIENT
Start: 2018-11-19 | End: 2019-08-18 | Stop reason: SDUPTHER

## 2018-12-11 ENCOUNTER — LAB (OUTPATIENT)
Dept: INTERNAL MEDICINE | Facility: CLINIC | Age: 73
End: 2018-12-11

## 2018-12-11 DIAGNOSIS — E78.2 MIXED HYPERLIPIDEMIA: ICD-10-CM

## 2018-12-11 DIAGNOSIS — K21.9 GASTROESOPHAGEAL REFLUX DISEASE WITHOUT ESOPHAGITIS: Primary | ICD-10-CM

## 2018-12-11 DIAGNOSIS — I10 ESSENTIAL HYPERTENSION: ICD-10-CM

## 2018-12-11 DIAGNOSIS — M80.00XD OSTEOPOROSIS WITH CURRENT PATHOLOGICAL FRACTURE WITH ROUTINE HEALING, UNSPECIFIED OSTEOPOROSIS TYPE, SUBSEQUENT ENCOUNTER: ICD-10-CM

## 2018-12-11 DIAGNOSIS — R73.02 IMPAIRED GLUCOSE TOLERANCE: ICD-10-CM

## 2018-12-11 DIAGNOSIS — Z00.00 ROUTINE GENERAL MEDICAL EXAMINATION AT A HEALTH CARE FACILITY: ICD-10-CM

## 2018-12-11 DIAGNOSIS — E55.9 VITAMIN D DEFICIENCY: ICD-10-CM

## 2018-12-11 LAB
25(OH)D3 SERPL-MCNC: 36.3 NG/ML
ALBUMIN SERPL-MCNC: 4.35 G/DL (ref 3.2–4.8)
ALBUMIN/GLOB SERPL: 1.9 G/DL (ref 1.5–2.5)
ALP SERPL-CCNC: 57 U/L (ref 25–100)
ALT SERPL W P-5'-P-CCNC: 26 U/L (ref 7–40)
ANION GAP SERPL CALCULATED.3IONS-SCNC: 7 MMOL/L (ref 3–11)
ARTICHOKE IGE QN: 117 MG/DL (ref 0–130)
AST SERPL-CCNC: 20 U/L (ref 0–33)
BILIRUB SERPL-MCNC: 0.4 MG/DL (ref 0.3–1.2)
BUN BLD-MCNC: 14 MG/DL (ref 9–23)
BUN/CREAT SERPL: 13.5 (ref 7–25)
CALCIUM SPEC-SCNC: 9.8 MG/DL (ref 8.7–10.4)
CHLORIDE SERPL-SCNC: 103 MMOL/L (ref 99–109)
CHOLEST SERPL-MCNC: 179 MG/DL (ref 0–200)
CO2 SERPL-SCNC: 30 MMOL/L (ref 20–31)
CREAT BLD-MCNC: 1.04 MG/DL (ref 0.6–1.3)
DEPRECATED RDW RBC AUTO: 48.9 FL (ref 37–54)
ERYTHROCYTE [DISTWIDTH] IN BLOOD BY AUTOMATED COUNT: 16.1 % (ref 11.3–14.5)
GFR SERPL CREATININE-BSD FRML MDRD: 63 ML/MIN/1.73
GLOBULIN UR ELPH-MCNC: 2.3 GM/DL
GLUCOSE BLD-MCNC: 98 MG/DL (ref 70–100)
HBA1C MFR BLD: 6.1 % (ref 4.8–5.6)
HCT VFR BLD AUTO: 42.8 % (ref 34.5–44)
HDLC SERPL-MCNC: 62 MG/DL (ref 40–60)
HGB BLD-MCNC: 13.7 G/DL (ref 11.5–15.5)
MCH RBC QN AUTO: 26.8 PG (ref 27–31)
MCHC RBC AUTO-ENTMCNC: 32 G/DL (ref 32–36)
MCV RBC AUTO: 83.8 FL (ref 80–99)
PLATELET # BLD AUTO: 352 10*3/MM3 (ref 150–450)
PMV BLD AUTO: 9.2 FL (ref 6–12)
POTASSIUM BLD-SCNC: 4.2 MMOL/L (ref 3.5–5.5)
PROT SERPL-MCNC: 6.6 G/DL (ref 5.7–8.2)
RBC # BLD AUTO: 5.11 10*6/MM3 (ref 3.89–5.14)
SODIUM BLD-SCNC: 140 MMOL/L (ref 132–146)
TRIGL SERPL-MCNC: 50 MG/DL (ref 0–150)
TSH SERPL DL<=0.05 MIU/L-ACNC: 1.34 MIU/ML (ref 0.35–5.35)
VIT B12 BLD-MCNC: >2000 PG/ML (ref 211–911)
WBC NRBC COR # BLD: 6.79 10*3/MM3 (ref 3.5–10.8)

## 2018-12-11 PROCEDURE — 82607 VITAMIN B-12: CPT | Performed by: INTERNAL MEDICINE

## 2018-12-11 PROCEDURE — 84443 ASSAY THYROID STIM HORMONE: CPT | Performed by: INTERNAL MEDICINE

## 2018-12-11 PROCEDURE — 80061 LIPID PANEL: CPT | Performed by: INTERNAL MEDICINE

## 2018-12-11 PROCEDURE — 85027 COMPLETE CBC AUTOMATED: CPT | Performed by: INTERNAL MEDICINE

## 2018-12-11 PROCEDURE — 82306 VITAMIN D 25 HYDROXY: CPT | Performed by: INTERNAL MEDICINE

## 2018-12-11 PROCEDURE — 80053 COMPREHEN METABOLIC PANEL: CPT | Performed by: INTERNAL MEDICINE

## 2018-12-11 PROCEDURE — 83036 HEMOGLOBIN GLYCOSYLATED A1C: CPT | Performed by: INTERNAL MEDICINE

## 2018-12-13 ENCOUNTER — OFFICE VISIT (OUTPATIENT)
Dept: INTERNAL MEDICINE | Facility: CLINIC | Age: 73
End: 2018-12-13

## 2018-12-13 VITALS
BODY MASS INDEX: 27.56 KG/M2 | DIASTOLIC BLOOD PRESSURE: 64 MMHG | HEART RATE: 64 BPM | SYSTOLIC BLOOD PRESSURE: 120 MMHG | WEIGHT: 146 LBS | HEIGHT: 61 IN

## 2018-12-13 DIAGNOSIS — M80.00XD OSTEOPOROSIS WITH CURRENT PATHOLOGICAL FRACTURE WITH ROUTINE HEALING, UNSPECIFIED OSTEOPOROSIS TYPE, SUBSEQUENT ENCOUNTER: ICD-10-CM

## 2018-12-13 DIAGNOSIS — I10 ESSENTIAL HYPERTENSION: Primary | ICD-10-CM

## 2018-12-13 DIAGNOSIS — Z23 NEED FOR INFLUENZA VACCINATION: ICD-10-CM

## 2018-12-13 DIAGNOSIS — E78.2 MIXED HYPERLIPIDEMIA: ICD-10-CM

## 2018-12-13 DIAGNOSIS — K21.9 GASTROESOPHAGEAL REFLUX DISEASE WITHOUT ESOPHAGITIS: ICD-10-CM

## 2018-12-13 DIAGNOSIS — R73.02 IMPAIRED GLUCOSE TOLERANCE: ICD-10-CM

## 2018-12-13 DIAGNOSIS — G56.22 ULNAR NEUROPATHY OF LEFT UPPER EXTREMITY: ICD-10-CM

## 2018-12-13 PROCEDURE — G0008 ADMIN INFLUENZA VIRUS VAC: HCPCS | Performed by: INTERNAL MEDICINE

## 2018-12-13 PROCEDURE — 90662 IIV NO PRSV INCREASED AG IM: CPT | Performed by: INTERNAL MEDICINE

## 2018-12-13 PROCEDURE — 99214 OFFICE O/P EST MOD 30 MIN: CPT | Performed by: INTERNAL MEDICINE

## 2018-12-13 NOTE — PROGRESS NOTES
Patient is a 73 y.o. female who is here for a follow up of chronic conditions.  Chief Complaint   Patient presents with   • Hypertension   • Hyperlipidemia         HPI:    Here for f/u.  Left arm numbness resolved.  BP has been good.  No dizziness or lightheadedness.  No HAs. No abdominal pains.  Sleeping good.  Energy level is good.  No ankle edema.     History:    Patient Active Problem List   Diagnosis   • Gastroesophageal reflux disease without esophagitis   • Impaired glucose tolerance   • Hyperlipidemia   • Hypertension   • Osteoporosis   • Asteatosis cutis   • Arm paresthesia, left   • Ulnar neuropathy of left upper extremity       No past medical history on file.    Past Surgical History:   Procedure Laterality Date   • HYSTERECTOMY  11/1993    and REG       Current Outpatient Medications on File Prior to Visit   Medication Sig   • amLODIPine (NORVASC) 5 MG tablet TAKE 1 TABLET DAILY   • ammonium lactate (LAC-HYDRIN) 12 % lotion Apply  topically.   • aspirin 81 MG tablet Take  by mouth daily.   • hydrochlorothiazide (HYDRODIURIL) 25 MG tablet TAKE 1 TABLET DAILY   • omeprazole (priLOSEC) 40 MG capsule TAKE 1 CAPSULE DAILY   • pravastatin (PRAVACHOL) 40 MG tablet TAKE 1 TABLET DAILY   • [DISCONTINUED] traZODone (DESYREL) 50 MG tablet Take 1 tablet by mouth At Night As Needed for sleep.     No current facility-administered medications on file prior to visit.        Family History   Problem Relation Age of Onset   • Diabetes Other    • Kidney disease Other    • Hypertension Other        Social History     Socioeconomic History   • Marital status:      Spouse name: Not on file   • Number of children: Not on file   • Years of education: Not on file   • Highest education level: Not on file   Social Needs   • Financial resource strain: Not on file   • Food insecurity - worry: Not on file   • Food insecurity - inability: Not on file   • Transportation needs - medical: Not on file   • Transportation needs -  "non-medical: Not on file   Occupational History   • Not on file   Tobacco Use   • Smoking status: Never Smoker   Substance and Sexual Activity   • Alcohol use: Yes   • Drug use: Not on file   • Sexual activity: Not on file   Other Topics Concern   • Not on file   Social History Narrative   • Not on file         Review of Systems   Constitutional: Negative for chills, diaphoresis, fatigue, fever and unexpected weight change.   HENT: Negative for congestion, ear pain, hearing loss, nosebleeds, postnasal drip, rhinorrhea, sinus pressure and sore throat.    Eyes: Negative for pain, discharge and itching.   Respiratory: Negative for cough, chest tightness, shortness of breath and wheezing.    Cardiovascular: Negative for chest pain, palpitations and leg swelling.   Gastrointestinal: Negative for abdominal distention, abdominal pain, blood in stool, constipation, diarrhea, nausea and vomiting.        1/13 colonoscopy normal   Endocrine: Negative for polydipsia and polyuria.   Genitourinary: Negative for difficulty urinating, dysuria, frequency and hematuria.        12/17 mammogram and followed by GYN   Musculoskeletal: Negative for arthralgias, back pain, gait problem, joint swelling and myalgias.   Skin: Negative for rash and wound.   Neurological: Negative for dizziness, syncope, weakness and headaches.   Psychiatric/Behavioral: Negative for dysphoric mood and sleep disturbance. The patient is not nervous/anxious.        /64 (BP Location: Left arm, Patient Position: Sitting)   Pulse 64   Ht 156.2 cm (61.5\")   Wt 66.2 kg (146 lb)   BMI 27.14 kg/m²       Physical Exam   Constitutional: She is oriented to person, place, and time. She appears well-developed and well-nourished.   HENT:   Head: Normocephalic and atraumatic.   Right Ear: External ear normal.   Left Ear: External ear normal.   Mouth/Throat: Oropharynx is clear and moist.   Eyes: Conjunctivae and EOM are normal.   Neck: Normal range of motion. Neck " supple.   Cardiovascular: Normal rate, regular rhythm and normal heart sounds.   Pulmonary/Chest: Effort normal and breath sounds normal.   Abdominal: Soft. Bowel sounds are normal.   Musculoskeletal: Normal range of motion.   Lymphadenopathy:     She has no cervical adenopathy.   Neurological: She is alert and oriented to person, place, and time.   Skin: Skin is warm and dry.   Psychiatric: She has a normal mood and affect. Her behavior is normal. Thought content normal.       Procedure:      Discussion/Summary:    htn-stable  hyperlipidemia-labs noted  gerd-stable  glucose intolerance-labs noted, watch carbs  osteoporosis-dc calcium and cont vit D and exercise  vaginal bleeding-resolved  xerosis-lachydrin prn  Left arm paresthesia/ulnar neuropathy-resolved      labs noted and dw patient, flu shot today        Current Outpatient Medications:   •  amLODIPine (NORVASC) 5 MG tablet, TAKE 1 TABLET DAILY, Disp: 90 tablet, Rfl: 2  •  ammonium lactate (LAC-HYDRIN) 12 % lotion, Apply  topically., Disp: , Rfl:   •  aspirin 81 MG tablet, Take  by mouth daily., Disp: , Rfl:   •  hydrochlorothiazide (HYDRODIURIL) 25 MG tablet, TAKE 1 TABLET DAILY, Disp: 90 tablet, Rfl: 2  •  omeprazole (priLOSEC) 40 MG capsule, TAKE 1 CAPSULE DAILY, Disp: 90 capsule, Rfl: 1  •  pravastatin (PRAVACHOL) 40 MG tablet, TAKE 1 TABLET DAILY, Disp: 90 tablet, Rfl: 2        Marleni was seen today for hypertension and hyperlipidemia.    Diagnoses and all orders for this visit:    Essential hypertension    Mixed hyperlipidemia    Gastroesophageal reflux disease without esophagitis    Impaired glucose tolerance    Ulnar neuropathy of left upper extremity    Osteoporosis with current pathological fracture with routine healing, unspecified osteoporosis type, subsequent encounter    Need for influenza vaccination  -     Fluzone High Dose =>65Years

## 2019-03-11 ENCOUNTER — TELEPHONE (OUTPATIENT)
Dept: INTERNAL MEDICINE | Facility: CLINIC | Age: 74
End: 2019-03-11

## 2019-03-11 NOTE — TELEPHONE ENCOUNTER
Advise pt to continue monitoring.the  Bottom number is very good. Advised pt to make sure cuff is not really tight before pumping up. Pt will monitor until the end of week

## 2019-03-11 NOTE — TELEPHONE ENCOUNTER
CASANDRA B/P THIS MORNING B-4 SHE TOOK THE B/P MEDS /74. 03/09/19 AT 7:00 AM, IT /70. ITS GOING UP AND DOWN. PLEASE CALL HER. [phone]. I TOLD HER TO KEEP HER CELL PHONE ON.

## 2019-04-16 ENCOUNTER — TELEPHONE (OUTPATIENT)
Dept: INTERNAL MEDICINE | Facility: CLINIC | Age: 74
End: 2019-04-16

## 2019-04-16 PROCEDURE — 80061 LIPID PANEL: CPT | Performed by: INTERNAL MEDICINE

## 2019-04-16 PROCEDURE — 83036 HEMOGLOBIN GLYCOSYLATED A1C: CPT | Performed by: INTERNAL MEDICINE

## 2019-04-16 PROCEDURE — 80053 COMPREHEN METABOLIC PANEL: CPT | Performed by: INTERNAL MEDICINE

## 2019-04-16 PROCEDURE — 85027 COMPLETE CBC AUTOMATED: CPT | Performed by: INTERNAL MEDICINE

## 2019-04-16 NOTE — TELEPHONE ENCOUNTER
She needs a statement saying she has no work restrictions, and it needs to be faxed to [facility] at fax number [phone].

## 2019-04-18 ENCOUNTER — OFFICE VISIT (OUTPATIENT)
Dept: INTERNAL MEDICINE | Facility: CLINIC | Age: 74
End: 2019-04-18

## 2019-04-18 VITALS
BODY MASS INDEX: 27.75 KG/M2 | HEIGHT: 61 IN | DIASTOLIC BLOOD PRESSURE: 70 MMHG | HEART RATE: 64 BPM | WEIGHT: 147 LBS | SYSTOLIC BLOOD PRESSURE: 130 MMHG

## 2019-04-18 DIAGNOSIS — K21.9 GASTROESOPHAGEAL REFLUX DISEASE WITHOUT ESOPHAGITIS: ICD-10-CM

## 2019-04-18 DIAGNOSIS — I10 ESSENTIAL HYPERTENSION: Primary | ICD-10-CM

## 2019-04-18 DIAGNOSIS — M80.00XD OSTEOPOROSIS WITH CURRENT PATHOLOGICAL FRACTURE WITH ROUTINE HEALING, UNSPECIFIED OSTEOPOROSIS TYPE, SUBSEQUENT ENCOUNTER: ICD-10-CM

## 2019-04-18 DIAGNOSIS — R73.02 IMPAIRED GLUCOSE TOLERANCE: ICD-10-CM

## 2019-04-18 DIAGNOSIS — E78.2 MIXED HYPERLIPIDEMIA: ICD-10-CM

## 2019-04-18 PROBLEM — R20.2 ARM PARESTHESIA, LEFT: Status: RESOLVED | Noted: 2018-09-04 | Resolved: 2019-04-18

## 2019-04-18 PROBLEM — G56.22 ULNAR NEUROPATHY OF LEFT UPPER EXTREMITY: Status: RESOLVED | Noted: 2018-10-11 | Resolved: 2019-04-18

## 2019-04-18 PROCEDURE — 99214 OFFICE O/P EST MOD 30 MIN: CPT | Performed by: INTERNAL MEDICINE

## 2019-04-18 RX ORDER — OMEPRAZOLE 40 MG/1
40 CAPSULE, DELAYED RELEASE ORAL EVERY MORNING
Qty: 90 CAPSULE | Refills: 1 | Status: SHIPPED | OUTPATIENT
Start: 2019-04-18 | End: 2019-04-18 | Stop reason: SDUPTHER

## 2019-04-18 RX ORDER — PRAVASTATIN SODIUM 40 MG
40 TABLET ORAL NIGHTLY
Qty: 90 TABLET | Refills: 3 | Status: SHIPPED | OUTPATIENT
Start: 2019-04-18 | End: 2019-04-18 | Stop reason: SDUPTHER

## 2019-04-18 RX ORDER — PRAVASTATIN SODIUM 40 MG
40 TABLET ORAL NIGHTLY
Qty: 90 TABLET | Refills: 3 | Status: SHIPPED | OUTPATIENT
Start: 2019-04-18 | End: 2020-06-02

## 2019-04-18 RX ORDER — OMEPRAZOLE 40 MG/1
40 CAPSULE, DELAYED RELEASE ORAL EVERY MORNING
Qty: 90 CAPSULE | Refills: 1 | Status: SHIPPED | OUTPATIENT
Start: 2019-04-18 | End: 2019-12-05 | Stop reason: SDUPTHER

## 2019-04-18 NOTE — PROGRESS NOTES
Patient is a 73 y.o. female who is here for a follow up of chronic conditions.  Chief Complaint   Patient presents with   • Hyperlipidemia   • Hypertension         HPI:    Here for f/u.  BP slightly up.  No dizziness or lightheadedness.  No HA.  No ankle edema.  No nocturia.  GERD is controlled.  Energy level is good.  Little exercise.     History:    Patient Active Problem List   Diagnosis   • Gastroesophageal reflux disease without esophagitis   • Impaired glucose tolerance   • Hyperlipidemia   • Hypertension   • Osteoporosis   • Asteatosis cutis       No past medical history on file.    Past Surgical History:   Procedure Laterality Date   • HYSTERECTOMY  11/1993    and REG       Current Outpatient Medications on File Prior to Visit   Medication Sig   • amLODIPine (NORVASC) 5 MG tablet TAKE 1 TABLET DAILY   • ammonium lactate (LAC-HYDRIN) 12 % lotion Apply  topically.   • aspirin 81 MG tablet Take  by mouth daily.   • hydrochlorothiazide (HYDRODIURIL) 25 MG tablet TAKE 1 TABLET DAILY   • [DISCONTINUED] omeprazole (priLOSEC) 40 MG capsule TAKE 1 CAPSULE DAILY   • [DISCONTINUED] pravastatin (PRAVACHOL) 40 MG tablet TAKE 1 TABLET DAILY     No current facility-administered medications on file prior to visit.        Family History   Problem Relation Age of Onset   • Diabetes Other    • Kidney disease Other    • Hypertension Other        Social History     Socioeconomic History   • Marital status:      Spouse name: Not on file   • Number of children: Not on file   • Years of education: Not on file   • Highest education level: Not on file   Tobacco Use   • Smoking status: Never Smoker   Substance and Sexual Activity   • Alcohol use: Yes         Review of Systems   Constitutional: Negative for chills, diaphoresis, fatigue, fever and unexpected weight change.   HENT: Negative for congestion, ear pain, hearing loss, nosebleeds, postnasal drip, rhinorrhea, sinus pressure and sore throat.    Eyes: Negative for pain,  "discharge and itching.   Respiratory: Negative for cough, chest tightness, shortness of breath and wheezing.    Cardiovascular: Negative for chest pain, palpitations and leg swelling.   Gastrointestinal: Negative for abdominal distention, abdominal pain, blood in stool, constipation, diarrhea, nausea and vomiting.        1/13 colonoscopy normal   Endocrine: Negative for polydipsia and polyuria.   Genitourinary: Negative for difficulty urinating, dysuria, frequency and hematuria.        12/18 mammogram and followed by GYN   Musculoskeletal: Negative for arthralgias, back pain, gait problem, joint swelling and myalgias.   Skin: Negative for rash and wound.   Neurological: Negative for dizziness, syncope, weakness and headaches.   Psychiatric/Behavioral: Negative for dysphoric mood and sleep disturbance. The patient is not nervous/anxious.        /70   Pulse 64   Ht 156.2 cm (61.5\")   Wt 66.7 kg (147 lb)   BMI 27.33 kg/m²       Physical Exam   Constitutional: She is oriented to person, place, and time. She appears well-developed and well-nourished.   HENT:   Head: Normocephalic and atraumatic.   Right Ear: External ear normal.   Left Ear: External ear normal.   Mouth/Throat: Oropharynx is clear and moist.   Eyes: Conjunctivae and EOM are normal.   Neck: Normal range of motion. Neck supple.   Cardiovascular: Normal rate, regular rhythm and normal heart sounds.   Pulmonary/Chest: Effort normal and breath sounds normal.   Abdominal: Soft. Bowel sounds are normal.   Musculoskeletal: Normal range of motion.   Lymphadenopathy:     She has no cervical adenopathy.   Neurological: She is alert and oriented to person, place, and time.   Skin: Skin is warm and dry.   Psychiatric: She has a normal mood and affect. Her behavior is normal. Thought content normal.       Procedure:      Discussion/Summary:    htn-stable, monitor properly  hyperlipidemia-labs noted  gerd-stable  glucose intolerance-labs noted, watch " carbs  osteoporosis-dc calcium and cont vit D and exercise  vaginal bleeding-resolved  xerosis-lachydrin prn  Left arm paresthesia/ulnar neuropathy-resolved      labs noted and dw patient           Current Outpatient Medications:   •  amLODIPine (NORVASC) 5 MG tablet, TAKE 1 TABLET DAILY, Disp: 90 tablet, Rfl: 2  •  ammonium lactate (LAC-HYDRIN) 12 % lotion, Apply  topically., Disp: , Rfl:   •  aspirin 81 MG tablet, Take  by mouth daily., Disp: , Rfl:   •  hydrochlorothiazide (HYDRODIURIL) 25 MG tablet, TAKE 1 TABLET DAILY, Disp: 90 tablet, Rfl: 2  •  omeprazole (priLOSEC) 40 MG capsule, Take 1 capsule by mouth Every Morning., Disp: 90 capsule, Rfl: 1  •  pravastatin (PRAVACHOL) 40 MG tablet, Take 1 tablet by mouth Every Night., Disp: 90 tablet, Rfl: 3        Marleni was seen today for hyperlipidemia and hypertension.    Diagnoses and all orders for this visit:    Essential hypertension    Mixed hyperlipidemia  -     Discontinue: pravastatin (PRAVACHOL) 40 MG tablet; Take 1 tablet by mouth Every Night.  -     pravastatin (PRAVACHOL) 40 MG tablet; Take 1 tablet by mouth Every Night.    Gastroesophageal reflux disease without esophagitis  -     Discontinue: omeprazole (priLOSEC) 40 MG capsule; Take 1 capsule by mouth Every Morning.  -     omeprazole (priLOSEC) 40 MG capsule; Take 1 capsule by mouth Every Morning.    Impaired glucose tolerance    Osteoporosis with current pathological fracture with routine healing, unspecified osteoporosis type, subsequent encounter

## 2019-05-14 ENCOUNTER — TELEPHONE (OUTPATIENT)
Dept: INTERNAL MEDICINE | Facility: CLINIC | Age: 74
End: 2019-05-14

## 2019-05-14 NOTE — TELEPHONE ENCOUNTER
FYI PATIENT CALLED AND WANTED TO LET YOU KNOW THAT SHE WAS SEEN AT [facility] ON [address] MAY 12, 2019 FOR CELLULITIS IF THE HAND.

## 2019-08-15 ENCOUNTER — LAB (OUTPATIENT)
Dept: INTERNAL MEDICINE | Facility: CLINIC | Age: 74
End: 2019-08-15

## 2019-08-15 DIAGNOSIS — Z00.00 ROUTINE GENERAL MEDICAL EXAMINATION AT A HEALTH CARE FACILITY: ICD-10-CM

## 2019-08-15 DIAGNOSIS — R73.02 IMPAIRED GLUCOSE TOLERANCE: ICD-10-CM

## 2019-08-15 DIAGNOSIS — E55.9 VITAMIN D DEFICIENCY: ICD-10-CM

## 2019-08-15 DIAGNOSIS — E78.2 MIXED HYPERLIPIDEMIA: Primary | ICD-10-CM

## 2019-08-15 DIAGNOSIS — I10 ESSENTIAL HYPERTENSION: ICD-10-CM

## 2019-08-15 LAB
25(OH)D3 SERPL-MCNC: 66.7 NG/ML (ref 30–100)
ALBUMIN SERPL-MCNC: 4.5 G/DL (ref 3.5–5.2)
ALBUMIN/GLOB SERPL: 1.7 G/DL
ALP SERPL-CCNC: 59 U/L (ref 39–117)
ALT SERPL W P-5'-P-CCNC: 15 U/L (ref 1–33)
ANION GAP SERPL CALCULATED.3IONS-SCNC: 14.8 MMOL/L (ref 5–15)
AST SERPL-CCNC: 15 U/L (ref 1–32)
BILIRUB SERPL-MCNC: 0.3 MG/DL (ref 0.2–1.2)
BUN BLD-MCNC: 18 MG/DL (ref 8–23)
BUN/CREAT SERPL: 18.2 (ref 7–25)
CALCIUM SPEC-SCNC: 9.8 MG/DL (ref 8.6–10.5)
CHLORIDE SERPL-SCNC: 101 MMOL/L (ref 98–107)
CHOLEST SERPL-MCNC: 202 MG/DL (ref 0–200)
CO2 SERPL-SCNC: 25.2 MMOL/L (ref 22–29)
CREAT BLD-MCNC: 0.99 MG/DL (ref 0.57–1)
DEPRECATED RDW RBC AUTO: 51.5 FL (ref 37–54)
ERYTHROCYTE [DISTWIDTH] IN BLOOD BY AUTOMATED COUNT: 16 % (ref 12.3–15.4)
GFR SERPL CREATININE-BSD FRML MDRD: 67 ML/MIN/1.73
GLOBULIN UR ELPH-MCNC: 2.7 GM/DL
GLUCOSE BLD-MCNC: 87 MG/DL (ref 65–99)
HBA1C MFR BLD: 5.8 % (ref 4.8–5.6)
HCT VFR BLD AUTO: 43.2 % (ref 34–46.6)
HDLC SERPL-MCNC: 57 MG/DL (ref 40–60)
HGB BLD-MCNC: 13 G/DL (ref 12–15.9)
LDLC SERPL CALC-MCNC: 134 MG/DL (ref 0–100)
LDLC/HDLC SERPL: 2.35 {RATIO}
MCH RBC QN AUTO: 26.4 PG (ref 26.6–33)
MCHC RBC AUTO-ENTMCNC: 30.1 G/DL (ref 31.5–35.7)
MCV RBC AUTO: 87.6 FL (ref 79–97)
PLATELET # BLD AUTO: 337 10*3/MM3 (ref 140–450)
PMV BLD AUTO: 9 FL (ref 6–12)
POTASSIUM BLD-SCNC: 3.8 MMOL/L (ref 3.5–5.2)
PROT SERPL-MCNC: 7.2 G/DL (ref 6–8.5)
RBC # BLD AUTO: 4.93 10*6/MM3 (ref 3.77–5.28)
SODIUM BLD-SCNC: 141 MMOL/L (ref 136–145)
TRIGL SERPL-MCNC: 54 MG/DL (ref 0–150)
TSH SERPL DL<=0.05 MIU/L-ACNC: 1.78 MIU/ML (ref 0.27–4.2)
VIT B12 BLD-MCNC: 1355 PG/ML (ref 211–946)
VLDLC SERPL-MCNC: 10.8 MG/DL (ref 5–40)
WBC NRBC COR # BLD: 6.27 10*3/MM3 (ref 3.4–10.8)

## 2019-08-15 PROCEDURE — 82607 VITAMIN B-12: CPT | Performed by: INTERNAL MEDICINE

## 2019-08-15 PROCEDURE — 80053 COMPREHEN METABOLIC PANEL: CPT | Performed by: INTERNAL MEDICINE

## 2019-08-15 PROCEDURE — 84443 ASSAY THYROID STIM HORMONE: CPT | Performed by: INTERNAL MEDICINE

## 2019-08-15 PROCEDURE — 83036 HEMOGLOBIN GLYCOSYLATED A1C: CPT | Performed by: INTERNAL MEDICINE

## 2019-08-15 PROCEDURE — 82306 VITAMIN D 25 HYDROXY: CPT | Performed by: INTERNAL MEDICINE

## 2019-08-15 PROCEDURE — 80061 LIPID PANEL: CPT | Performed by: INTERNAL MEDICINE

## 2019-08-15 PROCEDURE — 85027 COMPLETE CBC AUTOMATED: CPT | Performed by: INTERNAL MEDICINE

## 2019-08-19 RX ORDER — HYDROCHLOROTHIAZIDE 25 MG/1
TABLET ORAL
Qty: 90 TABLET | Refills: 4 | Status: SHIPPED | OUTPATIENT
Start: 2019-08-19 | End: 2020-11-11

## 2019-08-19 RX ORDER — AMLODIPINE BESYLATE 5 MG/1
TABLET ORAL
Qty: 90 TABLET | Refills: 4 | Status: SHIPPED | OUTPATIENT
Start: 2019-08-19 | End: 2020-11-11

## 2019-08-20 ENCOUNTER — OFFICE VISIT (OUTPATIENT)
Dept: INTERNAL MEDICINE | Facility: CLINIC | Age: 74
End: 2019-08-20

## 2019-08-20 VITALS
HEIGHT: 61 IN | BODY MASS INDEX: 29.07 KG/M2 | HEART RATE: 64 BPM | WEIGHT: 154 LBS | DIASTOLIC BLOOD PRESSURE: 70 MMHG | SYSTOLIC BLOOD PRESSURE: 130 MMHG

## 2019-08-20 DIAGNOSIS — I10 ESSENTIAL HYPERTENSION: Primary | ICD-10-CM

## 2019-08-20 DIAGNOSIS — M80.00XD OSTEOPOROSIS WITH CURRENT PATHOLOGICAL FRACTURE WITH ROUTINE HEALING, UNSPECIFIED OSTEOPOROSIS TYPE, SUBSEQUENT ENCOUNTER: ICD-10-CM

## 2019-08-20 DIAGNOSIS — R73.02 IMPAIRED GLUCOSE TOLERANCE: ICD-10-CM

## 2019-08-20 DIAGNOSIS — E78.2 MIXED HYPERLIPIDEMIA: ICD-10-CM

## 2019-08-20 DIAGNOSIS — K21.9 GASTROESOPHAGEAL REFLUX DISEASE WITHOUT ESOPHAGITIS: ICD-10-CM

## 2019-08-20 PROCEDURE — 99214 OFFICE O/P EST MOD 30 MIN: CPT | Performed by: INTERNAL MEDICINE

## 2019-08-20 NOTE — PROGRESS NOTES
Patient is a 74 y.o. female who is here for a follow up of chronic conditions.  Chief Complaint   Patient presents with   • Hyperlipidemia   • Hypertension         HPI:    Here for f/u.  Patient c/o arthritis in left hip and leg.  Worst in the am but improved as she mobilizes.  Massage helping as is her chiropractor.  BP has been good.  No dizziness or lightheadedness.  No HAs.  GERD is controlled.  No further vaginal bleeding.      History:     Patient Active Problem List   Diagnosis   • Gastroesophageal reflux disease without esophagitis   • Impaired glucose tolerance   • Hyperlipidemia   • Hypertension   • Osteoporosis   • Asteatosis cutis       No past medical history on file.    Past Surgical History:   Procedure Laterality Date   • HYSTERECTOMY  11/1993    and REG       Current Outpatient Medications on File Prior to Visit   Medication Sig   • amLODIPine (NORVASC) 5 MG tablet TAKE 1 TABLET DAILY   • ammonium lactate (LAC-HYDRIN) 12 % lotion Apply  topically.   • aspirin 81 MG tablet Take  by mouth daily.   • hydrochlorothiazide (HYDRODIURIL) 25 MG tablet TAKE 1 TABLET DAILY   • omeprazole (priLOSEC) 40 MG capsule Take 1 capsule by mouth Every Morning.   • pravastatin (PRAVACHOL) 40 MG tablet Take 1 tablet by mouth Every Night.     No current facility-administered medications on file prior to visit.        Family History   Problem Relation Age of Onset   • Diabetes Other    • Kidney disease Other    • Hypertension Other        Social History     Socioeconomic History   • Marital status:      Spouse name: Not on file   • Number of children: Not on file   • Years of education: Not on file   • Highest education level: Not on file   Tobacco Use   • Smoking status: Never Smoker   Substance and Sexual Activity   • Alcohol use: Yes         Review of Systems   Constitutional: Negative for chills, diaphoresis, fatigue, fever and unexpected weight change.   HENT: Negative for congestion, ear pain, hearing loss,  "nosebleeds, postnasal drip, rhinorrhea, sinus pressure and sore throat.    Eyes: Negative for pain, discharge and itching.   Respiratory: Negative for cough, chest tightness, shortness of breath and wheezing.    Cardiovascular: Negative for chest pain, palpitations and leg swelling.   Gastrointestinal: Negative for abdominal distention, abdominal pain, blood in stool, constipation, diarrhea, nausea and vomiting.        1/13 colonoscopy normal   Endocrine: Negative for polydipsia and polyuria.   Genitourinary: Negative for difficulty urinating, dysuria, frequency and hematuria.        12/18 mammogram and followed by GYN   Musculoskeletal: Positive for arthralgias. Negative for back pain, gait problem, joint swelling and myalgias.   Skin: Negative for rash and wound.   Neurological: Negative for dizziness, syncope, weakness and headaches.   Psychiatric/Behavioral: Negative for dysphoric mood and sleep disturbance. The patient is not nervous/anxious.        /70   Pulse 64   Ht 156.2 cm (61.5\")   Wt 69.9 kg (154 lb)   BMI 28.63 kg/m²       Physical Exam   Constitutional: She is oriented to person, place, and time. She appears well-developed and well-nourished.   HENT:   Head: Normocephalic and atraumatic.   Right Ear: External ear normal.   Left Ear: External ear normal.   Mouth/Throat: Oropharynx is clear and moist.   Eyes: Conjunctivae and EOM are normal.   Neck: Normal range of motion. Neck supple.   Cardiovascular: Normal rate, regular rhythm and normal heart sounds.   Pulmonary/Chest: Effort normal and breath sounds normal.   Abdominal: Soft. Bowel sounds are normal.   Musculoskeletal: Normal range of motion.   Lymphadenopathy:     She has no cervical adenopathy.   Neurological: She is alert and oriented to person, place, and time.   Skin: Skin is warm and dry.   Psychiatric: She has a normal mood and affect. Her behavior is normal. Thought content normal. "       Procedure:      Discussion/Summary:    htn-stable on CCB/HCTZ  hyperlipidemia-labs noted, advised to increased to qd statin  gerd-stable  glucose intolerance-labs noted, watch carbs  osteoporosis-dc calcium and cont vit D and exercise  vaginal bleeding-no recurrence  xerosis-lachydrin prn  DJD-tylenol prn      labs noted and dw patient        Current Outpatient Medications:   •  amLODIPine (NORVASC) 5 MG tablet, TAKE 1 TABLET DAILY, Disp: 90 tablet, Rfl: 4  •  ammonium lactate (LAC-HYDRIN) 12 % lotion, Apply  topically., Disp: , Rfl:   •  aspirin 81 MG tablet, Take  by mouth daily., Disp: , Rfl:   •  hydrochlorothiazide (HYDRODIURIL) 25 MG tablet, TAKE 1 TABLET DAILY, Disp: 90 tablet, Rfl: 4  •  omeprazole (priLOSEC) 40 MG capsule, Take 1 capsule by mouth Every Morning., Disp: 90 capsule, Rfl: 1  •  pravastatin (PRAVACHOL) 40 MG tablet, Take 1 tablet by mouth Every Night., Disp: 90 tablet, Rfl: 3        Marleni was seen today for hyperlipidemia and hypertension.    Diagnoses and all orders for this visit:    Essential hypertension    Mixed hyperlipidemia    Gastroesophageal reflux disease without esophagitis    Impaired glucose tolerance    Osteoporosis with current pathological fracture with routine healing, unspecified osteoporosis type, subsequent encounter

## 2019-10-16 ENCOUNTER — OFFICE VISIT (OUTPATIENT)
Dept: INTERNAL MEDICINE | Facility: CLINIC | Age: 74
End: 2019-10-16

## 2019-10-16 VITALS
WEIGHT: 154 LBS | HEIGHT: 61 IN | DIASTOLIC BLOOD PRESSURE: 70 MMHG | SYSTOLIC BLOOD PRESSURE: 140 MMHG | HEART RATE: 68 BPM | BODY MASS INDEX: 29.07 KG/M2

## 2019-10-16 DIAGNOSIS — M80.00XD OSTEOPOROSIS WITH CURRENT PATHOLOGICAL FRACTURE WITH ROUTINE HEALING, UNSPECIFIED OSTEOPOROSIS TYPE, SUBSEQUENT ENCOUNTER: ICD-10-CM

## 2019-10-16 DIAGNOSIS — E78.2 MIXED HYPERLIPIDEMIA: ICD-10-CM

## 2019-10-16 DIAGNOSIS — K21.9 GASTROESOPHAGEAL REFLUX DISEASE WITHOUT ESOPHAGITIS: ICD-10-CM

## 2019-10-16 DIAGNOSIS — K66.8 CYSTIC LESION OF ABDOMINAL VISCERA: ICD-10-CM

## 2019-10-16 DIAGNOSIS — R73.02 IMPAIRED GLUCOSE TOLERANCE: ICD-10-CM

## 2019-10-16 DIAGNOSIS — I10 ESSENTIAL HYPERTENSION: Primary | ICD-10-CM

## 2019-10-16 PROCEDURE — 99214 OFFICE O/P EST MOD 30 MIN: CPT | Performed by: INTERNAL MEDICINE

## 2019-10-16 NOTE — PROGRESS NOTES
Patient is a 74 y.o. female who is here for breast pain.  Chief Complaint   Patient presents with   • Breast Pain         HPI:    Patient c/o right lower rib pain.  Worst with twisting.  Cannot recall any trauma.  No nausea or emesis.  Feels irritated.  Little improvement.  No pain with deep inspiration.  No worst with eating.  Using nothing otc for the discomfort.  No fever or chills.  No recent URI.     History:     Patient Active Problem List   Diagnosis   • Gastroesophageal reflux disease without esophagitis   • Impaired glucose tolerance   • Hyperlipidemia   • Hypertension   • Osteoporosis   • Asteatosis cutis   • Cystic lesion of abdominal viscera       No past medical history on file.    Past Surgical History:   Procedure Laterality Date   • HYSTERECTOMY  11/1993    and REG       Current Outpatient Medications on File Prior to Visit   Medication Sig   • amLODIPine (NORVASC) 5 MG tablet TAKE 1 TABLET DAILY   • ammonium lactate (LAC-HYDRIN) 12 % lotion Apply  topically.   • aspirin 81 MG tablet Take  by mouth daily.   • hydrochlorothiazide (HYDRODIURIL) 25 MG tablet TAKE 1 TABLET DAILY   • omeprazole (priLOSEC) 40 MG capsule Take 1 capsule by mouth Every Morning.   • pravastatin (PRAVACHOL) 40 MG tablet Take 1 tablet by mouth Every Night.     No current facility-administered medications on file prior to visit.        Family History   Problem Relation Age of Onset   • Diabetes Other    • Kidney disease Other    • Hypertension Other        Social History     Socioeconomic History   • Marital status:      Spouse name: Not on file   • Number of children: Not on file   • Years of education: Not on file   • Highest education level: Not on file   Tobacco Use   • Smoking status: Never Smoker   Substance and Sexual Activity   • Alcohol use: Yes         Review of Systems   Constitutional: Negative for chills, diaphoresis, fatigue, fever and unexpected weight change.   HENT: Negative for congestion, ear pain,  "hearing loss, nosebleeds, postnasal drip, rhinorrhea, sinus pressure and sore throat.    Eyes: Negative for pain, discharge and itching.   Respiratory: Negative for cough, chest tightness, shortness of breath and wheezing.    Cardiovascular: Negative for chest pain, palpitations and leg swelling.   Gastrointestinal: Negative for abdominal distention, abdominal pain, blood in stool, constipation, diarrhea, nausea and vomiting.        1/13 colonoscopy normal   Endocrine: Negative for polydipsia and polyuria.   Genitourinary: Negative for difficulty urinating, dysuria, frequency and hematuria.        12/18 mammogram and followed by GYN   Musculoskeletal: Positive for arthralgias. Negative for back pain, gait problem, joint swelling and myalgias.        See HPI   Skin: Negative for rash and wound.   Neurological: Negative for dizziness, syncope, weakness and headaches.   Psychiatric/Behavioral: Negative for dysphoric mood and sleep disturbance. The patient is not nervous/anxious.        /70   Pulse 68   Ht 156.2 cm (61.5\")   Wt 69.9 kg (154 lb)   BMI 28.63 kg/m²       Physical Exam   Constitutional: She is oriented to person, place, and time. She appears well-developed and well-nourished.   HENT:   Head: Normocephalic and atraumatic.   Right Ear: External ear normal.   Left Ear: External ear normal.   Mouth/Throat: Oropharynx is clear and moist.   Eyes: Conjunctivae and EOM are normal.   Neck: Normal range of motion. Neck supple.   Cardiovascular: Normal rate, regular rhythm and normal heart sounds.   Pulmonary/Chest: Effort normal and breath sounds normal.   Abdominal: Soft. Bowel sounds are normal.   Musculoskeletal: Normal range of motion.   Right lower mid rib quarter sized cyst   Lymphadenopathy:     She has no cervical adenopathy.   Neurological: She is alert and oriented to person, place, and time.   Skin: Skin is warm and dry.   Psychiatric: She has a normal mood and affect. Her behavior is normal. " Thought content normal.       Procedure:      Discussion/Summary:    htn-stable on CCB/HCTZ  hyperlipidemia-labs 8/15 noted, advised to increased to qd statin  gerd-stable  glucose intolerance-labs noted,from 8/15 watch carbs  osteoporosis-dc calcium and cont vit D and exercis3  xerosis-lachydrin prn  DJD-tylenol prn  Abdominal wall cyst-GS to eval      8/15 labs noted and dw patient    Current Outpatient Medications:   •  amLODIPine (NORVASC) 5 MG tablet, TAKE 1 TABLET DAILY, Disp: 90 tablet, Rfl: 4  •  ammonium lactate (LAC-HYDRIN) 12 % lotion, Apply  topically., Disp: , Rfl:   •  aspirin 81 MG tablet, Take  by mouth daily., Disp: , Rfl:   •  hydrochlorothiazide (HYDRODIURIL) 25 MG tablet, TAKE 1 TABLET DAILY, Disp: 90 tablet, Rfl: 4  •  omeprazole (priLOSEC) 40 MG capsule, Take 1 capsule by mouth Every Morning., Disp: 90 capsule, Rfl: 1  •  pravastatin (PRAVACHOL) 40 MG tablet, Take 1 tablet by mouth Every Night., Disp: 90 tablet, Rfl: 3        Marleni was seen today for breast pain.    Diagnoses and all orders for this visit:    Essential hypertension    Mixed hyperlipidemia    Gastroesophageal reflux disease without esophagitis    Impaired glucose tolerance    Osteoporosis with current pathological fracture with routine healing, unspecified osteoporosis type, subsequent encounter    Cystic lesion of abdominal viscera  -     Ambulatory Referral to General Surgery

## 2019-12-05 DIAGNOSIS — K21.9 GASTROESOPHAGEAL REFLUX DISEASE WITHOUT ESOPHAGITIS: ICD-10-CM

## 2019-12-05 RX ORDER — OMEPRAZOLE 40 MG/1
CAPSULE, DELAYED RELEASE ORAL
Qty: 90 CAPSULE | Refills: 4 | Status: SHIPPED | OUTPATIENT
Start: 2019-12-05 | End: 2021-05-03

## 2019-12-17 ENCOUNTER — TELEPHONE (OUTPATIENT)
Dept: INTERNAL MEDICINE | Facility: CLINIC | Age: 74
End: 2019-12-17

## 2019-12-17 DIAGNOSIS — R73.02 IMPAIRED GLUCOSE TOLERANCE: ICD-10-CM

## 2019-12-17 DIAGNOSIS — I10 ESSENTIAL HYPERTENSION, MALIGNANT: ICD-10-CM

## 2019-12-17 DIAGNOSIS — E78.2 MIXED HYPERLIPIDEMIA: Primary | ICD-10-CM

## 2019-12-17 NOTE — TELEPHONE ENCOUNTER
Patient has appointment @3:15 on Friday. She wanted to know if orders for labs can be put in so she can go on and get them done. She said it will be too long to go without eating if she has to wait till after the appointment on Friday. Please call her back, she said it was ok to leave a message.

## 2019-12-19 LAB
ALBUMIN SERPL-MCNC: 4.2 G/DL (ref 3.5–5.2)
ALBUMIN/GLOB SERPL: 1.3 G/DL
ALP SERPL-CCNC: 50 U/L (ref 39–117)
ALT SERPL W P-5'-P-CCNC: 20 U/L (ref 1–33)
ANION GAP SERPL CALCULATED.3IONS-SCNC: 13 MMOL/L (ref 5–15)
AST SERPL-CCNC: 21 U/L (ref 1–32)
BILIRUB SERPL-MCNC: 0.4 MG/DL (ref 0.2–1.2)
BUN BLD-MCNC: 14 MG/DL (ref 8–23)
BUN/CREAT SERPL: 15.2 (ref 7–25)
CALCIUM SPEC-SCNC: 9.7 MG/DL (ref 8.6–10.5)
CHLORIDE SERPL-SCNC: 105 MMOL/L (ref 98–107)
CHOLEST SERPL-MCNC: 171 MG/DL (ref 0–200)
CO2 SERPL-SCNC: 26 MMOL/L (ref 22–29)
CREAT BLD-MCNC: 0.92 MG/DL (ref 0.57–1)
DEPRECATED RDW RBC AUTO: 44.1 FL (ref 37–54)
ERYTHROCYTE [DISTWIDTH] IN BLOOD BY AUTOMATED COUNT: 14.8 % (ref 12.3–15.4)
GFR SERPL CREATININE-BSD FRML MDRD: 73 ML/MIN/1.73
GLOBULIN UR ELPH-MCNC: 3.2 GM/DL
GLUCOSE BLD-MCNC: 100 MG/DL (ref 65–99)
HBA1C MFR BLD: 6.62 % (ref 4.8–5.6)
HCT VFR BLD AUTO: 39.4 % (ref 34–46.6)
HDLC SERPL-MCNC: 59 MG/DL (ref 40–60)
HGB BLD-MCNC: 12.9 G/DL (ref 12–15.9)
LDLC SERPL CALC-MCNC: 102 MG/DL (ref 0–100)
LDLC/HDLC SERPL: 1.73 {RATIO}
MCH RBC QN AUTO: 26.5 PG (ref 26.6–33)
MCHC RBC AUTO-ENTMCNC: 32.7 G/DL (ref 31.5–35.7)
MCV RBC AUTO: 81.1 FL (ref 79–97)
PLATELET # BLD AUTO: 352 10*3/MM3 (ref 140–450)
PMV BLD AUTO: 9.1 FL (ref 6–12)
POTASSIUM BLD-SCNC: 4.1 MMOL/L (ref 3.5–5.2)
PROT SERPL-MCNC: 7.4 G/DL (ref 6–8.5)
RBC # BLD AUTO: 4.86 10*6/MM3 (ref 3.77–5.28)
SODIUM BLD-SCNC: 144 MMOL/L (ref 136–145)
TRIGL SERPL-MCNC: 49 MG/DL (ref 0–150)
TSH SERPL DL<=0.05 MIU/L-ACNC: 1.26 UIU/ML (ref 0.27–4.2)
VLDLC SERPL-MCNC: 9.8 MG/DL (ref 5–40)
WBC NRBC COR # BLD: 5.92 10*3/MM3 (ref 3.4–10.8)

## 2019-12-19 PROCEDURE — 84443 ASSAY THYROID STIM HORMONE: CPT | Performed by: INTERNAL MEDICINE

## 2019-12-19 PROCEDURE — 85027 COMPLETE CBC AUTOMATED: CPT | Performed by: INTERNAL MEDICINE

## 2019-12-19 PROCEDURE — 80053 COMPREHEN METABOLIC PANEL: CPT | Performed by: INTERNAL MEDICINE

## 2019-12-19 PROCEDURE — 83036 HEMOGLOBIN GLYCOSYLATED A1C: CPT | Performed by: INTERNAL MEDICINE

## 2019-12-19 PROCEDURE — 80061 LIPID PANEL: CPT | Performed by: INTERNAL MEDICINE

## 2019-12-20 ENCOUNTER — OFFICE VISIT (OUTPATIENT)
Dept: INTERNAL MEDICINE | Facility: CLINIC | Age: 74
End: 2019-12-20

## 2019-12-20 VITALS
HEIGHT: 61 IN | HEART RATE: 68 BPM | DIASTOLIC BLOOD PRESSURE: 74 MMHG | BODY MASS INDEX: 28.7 KG/M2 | WEIGHT: 152 LBS | SYSTOLIC BLOOD PRESSURE: 124 MMHG

## 2019-12-20 DIAGNOSIS — I10 ESSENTIAL HYPERTENSION: Primary | ICD-10-CM

## 2019-12-20 DIAGNOSIS — E78.2 MIXED HYPERLIPIDEMIA: ICD-10-CM

## 2019-12-20 DIAGNOSIS — Z23 NEED FOR INFLUENZA VACCINATION: ICD-10-CM

## 2019-12-20 DIAGNOSIS — Z78.0 POST-MENOPAUSAL: ICD-10-CM

## 2019-12-20 DIAGNOSIS — M80.00XD OSTEOPOROSIS WITH CURRENT PATHOLOGICAL FRACTURE WITH ROUTINE HEALING, UNSPECIFIED OSTEOPOROSIS TYPE, SUBSEQUENT ENCOUNTER: ICD-10-CM

## 2019-12-20 DIAGNOSIS — K21.9 GASTROESOPHAGEAL REFLUX DISEASE WITHOUT ESOPHAGITIS: ICD-10-CM

## 2019-12-20 DIAGNOSIS — R73.02 IMPAIRED GLUCOSE TOLERANCE: ICD-10-CM

## 2019-12-20 PROCEDURE — G0008 ADMIN INFLUENZA VIRUS VAC: HCPCS | Performed by: INTERNAL MEDICINE

## 2019-12-20 PROCEDURE — 99214 OFFICE O/P EST MOD 30 MIN: CPT | Performed by: INTERNAL MEDICINE

## 2019-12-20 PROCEDURE — 90653 IIV ADJUVANT VACCINE IM: CPT | Performed by: INTERNAL MEDICINE

## 2019-12-20 NOTE — PROGRESS NOTES
Patient is a 74 y.o. female who is here for a follow up of hyperlipidemia and hypertension  Chief Complaint   Patient presents with   • Hyperlipidemia   • Hypertension         HPI:    Here for mgmt of HTN and hyperlipidemia.  Stress caring for her  who was recently treated for kidney cancer.  No fever or chills.  No dizziness or lightheadedness.  No HAs.  No abdominal pains.  Sleeping well.  GERD is controlled.     History:     Patient Active Problem List   Diagnosis   • Gastroesophageal reflux disease without esophagitis   • Impaired glucose tolerance   • Hyperlipidemia   • Hypertension   • Osteoporosis   • Asteatosis cutis   • Cystic lesion of abdominal viscera       No past medical history on file.    Past Surgical History:   Procedure Laterality Date   • HYSTERECTOMY  11/1993    and REG       Current Outpatient Medications on File Prior to Visit   Medication Sig   • amLODIPine (NORVASC) 5 MG tablet TAKE 1 TABLET DAILY   • ammonium lactate (LAC-HYDRIN) 12 % lotion Apply  topically.   • aspirin 81 MG tablet Take  by mouth daily.   • hydrochlorothiazide (HYDRODIURIL) 25 MG tablet TAKE 1 TABLET DAILY   • omeprazole (priLOSEC) 40 MG capsule TAKE 1 CAPSULE EVERY MORNING   • pravastatin (PRAVACHOL) 40 MG tablet Take 1 tablet by mouth Every Night.     No current facility-administered medications on file prior to visit.        Family History   Problem Relation Age of Onset   • Diabetes Other    • Kidney disease Other    • Hypertension Other        Social History     Socioeconomic History   • Marital status:      Spouse name: Not on file   • Number of children: Not on file   • Years of education: Not on file   • Highest education level: Not on file   Tobacco Use   • Smoking status: Never Smoker   Substance and Sexual Activity   • Alcohol use: Yes         Review of Systems   Constitutional: Negative for chills, diaphoresis, fatigue, fever and unexpected weight change.   HENT: Negative for congestion, ear  "pain, hearing loss, nosebleeds, postnasal drip, rhinorrhea, sinus pressure and sore throat.    Eyes: Negative for pain, discharge and itching.   Respiratory: Negative for cough, chest tightness, shortness of breath and wheezing.    Cardiovascular: Negative for chest pain, palpitations and leg swelling.   Gastrointestinal: Negative for abdominal distention, abdominal pain, blood in stool, constipation, diarrhea, nausea and vomiting.        1/13 colonoscopy normal   Endocrine: Negative for polydipsia and polyuria.   Genitourinary: Negative for difficulty urinating, dysuria, frequency and hematuria.        12/18 mammogram and followed by GYN   Musculoskeletal: Positive for arthralgias. Negative for back pain, gait problem, joint swelling and myalgias.        See HPI   Skin: Negative for rash and wound.   Neurological: Negative for dizziness, syncope, weakness and headaches.   Psychiatric/Behavioral: Negative for dysphoric mood and sleep disturbance. The patient is not nervous/anxious.        /74 (BP Location: Left arm, Patient Position: Sitting)   Pulse 68   Ht 156.2 cm (61.5\")   Wt 68.9 kg (152 lb)   BMI 28.26 kg/m²       Physical Exam   Constitutional: She is oriented to person, place, and time. She appears well-developed and well-nourished.   HENT:   Head: Normocephalic and atraumatic.   Right Ear: External ear normal.   Left Ear: External ear normal.   Mouth/Throat: Oropharynx is clear and moist.   Eyes: Conjunctivae and EOM are normal.   Neck: Normal range of motion. Neck supple.   Cardiovascular: Normal rate, regular rhythm and normal heart sounds.   Pulmonary/Chest: Effort normal and breath sounds normal.   Abdominal: Soft. Bowel sounds are normal.   Musculoskeletal: Normal range of motion.   Right lower mid rib quarter sized cyst   Lymphadenopathy:     She has no cervical adenopathy.   Neurological: She is alert and oriented to person, place, and time.   Skin: Skin is warm and dry.   Psychiatric: She " has a normal mood and affect. Her behavior is normal. Thought content normal.       Procedure:      Discussion/Summary:    htn-stable on CCB/HCTZ  hyperlipidemia-labs 12/19 noted, improved  gerd-stable on PPI  glucose intolerance-labs noted,from 12/19 and advised to watch carbs  osteoporosis-DEXA soon  xerosis-lachydrin prn, stable  DJD-tylenol prn, stable  Abdominal wall cyst-GS notes reviewed      12/19 labs noted and dw patient    Current Outpatient Medications:   •  amLODIPine (NORVASC) 5 MG tablet, TAKE 1 TABLET DAILY, Disp: 90 tablet, Rfl: 4  •  ammonium lactate (LAC-HYDRIN) 12 % lotion, Apply  topically., Disp: , Rfl:   •  aspirin 81 MG tablet, Take  by mouth daily., Disp: , Rfl:   •  hydrochlorothiazide (HYDRODIURIL) 25 MG tablet, TAKE 1 TABLET DAILY, Disp: 90 tablet, Rfl: 4  •  omeprazole (priLOSEC) 40 MG capsule, TAKE 1 CAPSULE EVERY MORNING, Disp: 90 capsule, Rfl: 4  •  pravastatin (PRAVACHOL) 40 MG tablet, Take 1 tablet by mouth Every Night., Disp: 90 tablet, Rfl: 3        Marleni was seen today for hyperlipidemia and hypertension.    Diagnoses and all orders for this visit:    Essential hypertension    Mixed hyperlipidemia    Gastroesophageal reflux disease without esophagitis    Impaired glucose tolerance    Osteoporosis with current pathological fracture with routine healing, unspecified osteoporosis type, subsequent encounter  -     DEXA Bone Density Axial    Need for influenza vaccination  -     Fluad Tri 65yr+    Post-menopausal  -     DEXA Bone Density Axial

## 2020-04-15 ENCOUNTER — APPOINTMENT (OUTPATIENT)
Dept: BONE DENSITY | Facility: HOSPITAL | Age: 75
End: 2020-04-15

## 2020-04-22 PROCEDURE — 80061 LIPID PANEL: CPT | Performed by: INTERNAL MEDICINE

## 2020-04-22 PROCEDURE — 84443 ASSAY THYROID STIM HORMONE: CPT | Performed by: INTERNAL MEDICINE

## 2020-04-22 PROCEDURE — 85027 COMPLETE CBC AUTOMATED: CPT | Performed by: INTERNAL MEDICINE

## 2020-04-22 PROCEDURE — 83036 HEMOGLOBIN GLYCOSYLATED A1C: CPT | Performed by: INTERNAL MEDICINE

## 2020-04-22 PROCEDURE — 80053 COMPREHEN METABOLIC PANEL: CPT | Performed by: INTERNAL MEDICINE

## 2020-06-02 DIAGNOSIS — E78.2 MIXED HYPERLIPIDEMIA: ICD-10-CM

## 2020-06-02 RX ORDER — PRAVASTATIN SODIUM 40 MG
TABLET ORAL
Qty: 90 TABLET | Refills: 3 | Status: SHIPPED | OUTPATIENT
Start: 2020-06-02 | End: 2021-04-22 | Stop reason: SDUPTHER

## 2020-07-27 ENCOUNTER — HOSPITAL ENCOUNTER (OUTPATIENT)
Dept: BONE DENSITY | Facility: HOSPITAL | Age: 75
Discharge: HOME | End: 2020-07-27
Admitting: INTERNAL MEDICINE

## 2020-07-27 PROCEDURE — 77080 DXA BONE DENSITY AXIAL: CPT

## 2020-07-29 ENCOUNTER — TELEPHONE (OUTPATIENT)
Dept: INTERNAL MEDICINE | Facility: CLINIC | Age: 75
End: 2020-07-29

## 2020-07-29 NOTE — TELEPHONE ENCOUNTER
PATIENT CALLING IN TO PROVIDE HER CELL NUMBER FOR DR BECK TO CALL HER BACK.  SHE MISSED HIS CALLS YESTERDAY AND WANTED TO MAKE SURE HE HAS THE CELL NUMBER TO CALL HER BACK TODAY [phone]

## 2020-11-11 RX ORDER — AMLODIPINE BESYLATE 5 MG/1
TABLET ORAL
Qty: 90 TABLET | Refills: 3 | Status: SHIPPED | OUTPATIENT
Start: 2020-11-11 | End: 2021-11-08

## 2020-11-11 RX ORDER — HYDROCHLOROTHIAZIDE 25 MG/1
TABLET ORAL
Qty: 90 TABLET | Refills: 3 | Status: SHIPPED | OUTPATIENT
Start: 2020-11-11 | End: 2021-11-08

## 2021-04-21 ENCOUNTER — OFFICE VISIT (OUTPATIENT)
Dept: INTERNAL MEDICINE | Facility: CLINIC | Age: 76
End: 2021-04-21

## 2021-04-21 ENCOUNTER — LAB (OUTPATIENT)
Dept: LAB | Facility: HOSPITAL | Age: 76
End: 2021-04-21

## 2021-04-21 VITALS
HEIGHT: 61 IN | HEART RATE: 58 BPM | BODY MASS INDEX: 29.83 KG/M2 | RESPIRATION RATE: 16 BRPM | OXYGEN SATURATION: 97 % | TEMPERATURE: 97.1 F | WEIGHT: 158 LBS | DIASTOLIC BLOOD PRESSURE: 70 MMHG | SYSTOLIC BLOOD PRESSURE: 130 MMHG

## 2021-04-21 DIAGNOSIS — R73.02 IMPAIRED GLUCOSE TOLERANCE: ICD-10-CM

## 2021-04-21 DIAGNOSIS — I10 ESSENTIAL HYPERTENSION: Primary | ICD-10-CM

## 2021-04-21 DIAGNOSIS — E55.9 VITAMIN D DEFICIENCY: ICD-10-CM

## 2021-04-21 DIAGNOSIS — K21.9 GASTROESOPHAGEAL REFLUX DISEASE WITHOUT ESOPHAGITIS: ICD-10-CM

## 2021-04-21 DIAGNOSIS — M80.00XD OSTEOPOROSIS WITH CURRENT PATHOLOGICAL FRACTURE WITH ROUTINE HEALING, UNSPECIFIED OSTEOPOROSIS TYPE, SUBSEQUENT ENCOUNTER: ICD-10-CM

## 2021-04-21 DIAGNOSIS — E78.2 MIXED HYPERLIPIDEMIA: ICD-10-CM

## 2021-04-21 LAB
25(OH)D3 SERPL-MCNC: 51.1 NG/ML
ALBUMIN SERPL-MCNC: 4.3 G/DL (ref 3.5–5.2)
ALBUMIN/GLOB SERPL: 1.4 G/DL
ALP SERPL-CCNC: 60 U/L (ref 39–117)
ALT SERPL W P-5'-P-CCNC: 23 U/L (ref 1–33)
ANION GAP SERPL CALCULATED.3IONS-SCNC: 11.6 MMOL/L (ref 5–15)
AST SERPL-CCNC: 20 U/L (ref 1–32)
BILIRUB SERPL-MCNC: 0.4 MG/DL (ref 0–1.2)
BUN SERPL-MCNC: 16 MG/DL (ref 8–23)
BUN/CREAT SERPL: 16 (ref 7–25)
CALCIUM SPEC-SCNC: 9.7 MG/DL (ref 8.6–10.5)
CHLORIDE SERPL-SCNC: 102 MMOL/L (ref 98–107)
CHOLEST SERPL-MCNC: 193 MG/DL (ref 0–200)
CO2 SERPL-SCNC: 26.4 MMOL/L (ref 22–29)
CREAT SERPL-MCNC: 1 MG/DL (ref 0.57–1)
DEPRECATED RDW RBC AUTO: 44.4 FL (ref 37–54)
ERYTHROCYTE [DISTWIDTH] IN BLOOD BY AUTOMATED COUNT: 14.8 % (ref 12.3–15.4)
GFR SERPL CREATININE-BSD FRML MDRD: 66 ML/MIN/1.73
GLOBULIN UR ELPH-MCNC: 3 GM/DL
GLUCOSE SERPL-MCNC: 77 MG/DL (ref 65–99)
HBA1C MFR BLD: 6.27 % (ref 4.8–5.6)
HCT VFR BLD AUTO: 42.3 % (ref 34–46.6)
HDLC SERPL-MCNC: 60 MG/DL (ref 40–60)
HGB BLD-MCNC: 13.8 G/DL (ref 12–15.9)
LDLC SERPL CALC-MCNC: 122 MG/DL (ref 0–100)
LDLC/HDLC SERPL: 2.02 {RATIO}
MCH RBC QN AUTO: 27 PG (ref 26.6–33)
MCHC RBC AUTO-ENTMCNC: 32.6 G/DL (ref 31.5–35.7)
MCV RBC AUTO: 82.6 FL (ref 79–97)
PLATELET # BLD AUTO: 371 10*3/MM3 (ref 140–450)
PMV BLD AUTO: 9.2 FL (ref 6–12)
POTASSIUM SERPL-SCNC: 3.8 MMOL/L (ref 3.5–5.2)
PROT SERPL-MCNC: 7.3 G/DL (ref 6–8.5)
RBC # BLD AUTO: 5.12 10*6/MM3 (ref 3.77–5.28)
SODIUM SERPL-SCNC: 140 MMOL/L (ref 136–145)
TRIGL SERPL-MCNC: 60 MG/DL (ref 0–150)
VIT B12 BLD-MCNC: 1320 PG/ML (ref 211–946)
VLDLC SERPL-MCNC: 11 MG/DL (ref 5–40)
WBC # BLD AUTO: 7.38 10*3/MM3 (ref 3.4–10.8)

## 2021-04-21 PROCEDURE — 82607 VITAMIN B-12: CPT | Performed by: INTERNAL MEDICINE

## 2021-04-21 PROCEDURE — 99214 OFFICE O/P EST MOD 30 MIN: CPT | Performed by: INTERNAL MEDICINE

## 2021-04-21 PROCEDURE — 84443 ASSAY THYROID STIM HORMONE: CPT | Performed by: INTERNAL MEDICINE

## 2021-04-21 PROCEDURE — 83036 HEMOGLOBIN GLYCOSYLATED A1C: CPT | Performed by: INTERNAL MEDICINE

## 2021-04-21 PROCEDURE — 85027 COMPLETE CBC AUTOMATED: CPT | Performed by: INTERNAL MEDICINE

## 2021-04-21 PROCEDURE — 82306 VITAMIN D 25 HYDROXY: CPT | Performed by: INTERNAL MEDICINE

## 2021-04-21 PROCEDURE — 80061 LIPID PANEL: CPT | Performed by: INTERNAL MEDICINE

## 2021-04-21 PROCEDURE — 80053 COMPREHEN METABOLIC PANEL: CPT | Performed by: INTERNAL MEDICINE

## 2021-04-21 RX ORDER — AZELASTINE 1 MG/ML
SPRAY, METERED NASAL
COMMUNITY

## 2021-04-21 NOTE — PROGRESS NOTES
Patient is a 75 y.o. female who is here for a follow up of   Chief Complaint   Patient presents with   • Hypertension     f/u         HPI:    Here for mgmt of HTN and hyperlipidemia and GERD.  BP has been good for the most part.  Goes up and down.  No dizziness or lightheadedness.  No HAs.  GERD is controlled.  No nausea or emesis.  Sleeping fair.  Has gained some weight since last seen.      History:     Patient Active Problem List   Diagnosis   • Gastroesophageal reflux disease without esophagitis   • Impaired glucose tolerance   • Hyperlipidemia   • Hypertension   • Osteoporosis   • Asteatosis cutis   • Cystic lesion of abdominal viscera       No past medical history on file.    Past Surgical History:   Procedure Laterality Date   • HYSTERECTOMY  11/1993    and REG       Current Outpatient Medications on File Prior to Visit   Medication Sig   • amLODIPine (NORVASC) 5 MG tablet TAKE 1 TABLET DAILY   • ammonium lactate (LAC-HYDRIN) 12 % lotion Apply  topically.   • aspirin 81 MG tablet Take  by mouth daily.   • azelastine (ASTELIN) 0.1 % nasal spray azelastine 137 mcg (0.1 %) nasal spray aerosol   U 1 SPR IEN BID   • hydroCHLOROthiazide (HYDRODIURIL) 25 MG tablet TAKE 1 TABLET DAILY   • omeprazole (priLOSEC) 40 MG capsule TAKE 1 CAPSULE EVERY MORNING   • [DISCONTINUED] pravastatin (PRAVACHOL) 40 MG tablet TAKE 1 TABLET EVERY NIGHT     No current facility-administered medications on file prior to visit.       Family History   Problem Relation Age of Onset   • Diabetes Other    • Kidney disease Other    • Hypertension Other        Social History     Socioeconomic History   • Marital status:      Spouse name: Not on file   • Number of children: Not on file   • Years of education: Not on file   • Highest education level: Not on file   Tobacco Use   • Smoking status: Never Smoker   Substance and Sexual Activity   • Alcohol use: Yes         Review of Systems   Constitutional: Positive for unexpected weight change.  "Negative for chills, diaphoresis, fatigue and fever.   HENT: Negative for congestion, ear pain, hearing loss, nosebleeds, postnasal drip, rhinorrhea, sinus pressure and sore throat.    Eyes: Negative for pain, discharge and itching.   Respiratory: Negative for cough, chest tightness, shortness of breath and wheezing.    Cardiovascular: Negative for chest pain, palpitations and leg swelling.   Gastrointestinal: Negative for abdominal distention, abdominal pain, blood in stool, constipation, diarrhea, nausea and vomiting.        1/13 colonoscopy normal   Endocrine: Negative for polydipsia and polyuria.   Genitourinary: Negative for difficulty urinating, dysuria, frequency and hematuria.        2/21 mammogram and followed by GYN   Musculoskeletal: Positive for arthralgias. Negative for back pain, gait problem, joint swelling and myalgias.        See HPI   Skin: Negative for rash and wound.   Neurological: Negative for dizziness, syncope, weakness and headaches.   Psychiatric/Behavioral: Negative for dysphoric mood and sleep disturbance. The patient is not nervous/anxious.        /70   Pulse 58   Temp 97.1 °F (36.2 °C) (Temporal)   Resp 16   Ht 154.9 cm (61\")   Wt 71.7 kg (158 lb)   SpO2 97%   Breastfeeding No   BMI 29.85 kg/m²       Physical Exam  Constitutional:       Appearance: Normal appearance. She is well-developed.   HENT:      Head: Normocephalic and atraumatic.      Right Ear: External ear normal.      Left Ear: External ear normal.      Nose: Nose normal.      Mouth/Throat:      Mouth: Mucous membranes are moist.      Pharynx: Oropharynx is clear.   Eyes:      Extraocular Movements: Extraocular movements intact.      Conjunctiva/sclera: Conjunctivae normal.      Pupils: Pupils are equal, round, and reactive to light.   Cardiovascular:      Rate and Rhythm: Normal rate and regular rhythm.      Heart sounds: Normal heart sounds.   Pulmonary:      Effort: Pulmonary effort is normal.      Breath " sounds: Normal breath sounds.   Abdominal:      General: Bowel sounds are normal.      Palpations: Abdomen is soft.   Musculoskeletal:         General: Normal range of motion.      Cervical back: Normal range of motion and neck supple.   Lymphadenopathy:      Cervical: No cervical adenopathy.   Skin:     General: Skin is warm and dry.   Neurological:      General: No focal deficit present.      Mental Status: She is alert and oriented to person, place, and time.   Psychiatric:         Mood and Affect: Mood normal.         Behavior: Behavior normal.         Thought Content: Thought content normal.         Procedure:      Discussion/Summary:    htn-stable on CCB/HCTZ, goal of 130/80  hyperlipidemia-labs today worsened, will in pravachol to 80 mg  gerd-stable on omeprazole  glucose intolerance-labs today and advised to watch carbs  osteoporosis-DEXA from 7/20 dw patient, improved  xerosis-lachydrin prn, stable  DJD-tylenol prn, stable  Abdominal wall cyst-GS notes reviewed      4/21 labs noted and dw patient    Current Outpatient Medications:   •  amLODIPine (NORVASC) 5 MG tablet, TAKE 1 TABLET DAILY, Disp: 90 tablet, Rfl: 3  •  ammonium lactate (LAC-HYDRIN) 12 % lotion, Apply  topically., Disp: , Rfl:   •  aspirin 81 MG tablet, Take  by mouth daily., Disp: , Rfl:   •  azelastine (ASTELIN) 0.1 % nasal spray, azelastine 137 mcg (0.1 %) nasal spray aerosol  U 1 SPR IEN BID, Disp: , Rfl:   •  hydroCHLOROthiazide (HYDRODIURIL) 25 MG tablet, TAKE 1 TABLET DAILY, Disp: 90 tablet, Rfl: 3  •  omeprazole (priLOSEC) 40 MG capsule, TAKE 1 CAPSULE EVERY MORNING, Disp: 90 capsule, Rfl: 4  •  pravastatin (PRAVACHOL) 80 MG tablet, Take 1 tablet by mouth Every Night., Disp: 90 tablet, Rfl: 3        Diagnoses and all orders for this visit:    1. Essential hypertension (Primary)  -     CBC (No Diff)    2. Mixed hyperlipidemia  -     Comprehensive Metabolic Panel  -     Lipid Panel  -     TSH  -     pravastatin (PRAVACHOL) 80 MG tablet;  Take 1 tablet by mouth Every Night.  Dispense: 90 tablet; Refill: 3    3. Impaired glucose tolerance  -     Hemoglobin A1c    4. Gastroesophageal reflux disease without esophagitis  -     Vitamin B12    5. Osteoporosis with current pathological fracture with routine healing, unspecified osteoporosis type, subsequent encounter  -     Vitamin D 25 Hydroxy    6. Vitamin D deficiency  -     Vitamin D 25 Hydroxy

## 2021-04-22 ENCOUNTER — TELEPHONE (OUTPATIENT)
Dept: INTERNAL MEDICINE | Facility: CLINIC | Age: 76
End: 2021-04-22

## 2021-04-22 LAB — TSH SERPL DL<=0.05 MIU/L-ACNC: 1.73 UIU/ML (ref 0.27–4.2)

## 2021-04-22 RX ORDER — PRAVASTATIN SODIUM 80 MG/1
80 TABLET ORAL NIGHTLY
Qty: 90 TABLET | Refills: 3 | Status: SHIPPED | OUTPATIENT
Start: 2021-04-22 | End: 2022-06-10

## 2021-04-22 NOTE — TELEPHONE ENCOUNTER
PATIENT IS NEEDING A STATEMENT FROM HER PCP. THIS NEEDS TO GO TO HER JOB ANDTHEY NEED IT TO SAY THAT SHE HAD A WELLNESS VISIT ON 04/21 AND THAT SHE IS CLEARED TO RETURN TO WORK. IT CAN BE FAXED [phone]. THAT IS GOING TO [facility].    CONTACT: [phone]

## 2021-05-03 DIAGNOSIS — K21.9 GASTROESOPHAGEAL REFLUX DISEASE WITHOUT ESOPHAGITIS: ICD-10-CM

## 2021-05-03 RX ORDER — OMEPRAZOLE 40 MG/1
CAPSULE, DELAYED RELEASE ORAL
Qty: 90 CAPSULE | Refills: 3 | Status: SHIPPED | OUTPATIENT
Start: 2021-05-03 | End: 2022-04-28

## 2021-05-21 ENCOUNTER — TELEPHONE (OUTPATIENT)
Dept: INTERNAL MEDICINE | Facility: CLINIC | Age: 76
End: 2021-05-21

## 2021-05-21 RX ORDER — ONDANSETRON 4 MG/1
4 TABLET, ORALLY DISINTEGRATING ORAL EVERY 8 HOURS PRN
Qty: 15 TABLET | Refills: 1 | Status: SHIPPED | OUTPATIENT
Start: 2021-05-21

## 2021-05-21 NOTE — TELEPHONE ENCOUNTER
PATIENT HAVING DIARRHEA AND VOMITING AND WOULD LIKE PRESCRIPTION.  NO OTHER SYMPTOMS, NO FEVER.     PLEASE CALL [phone]

## 2021-09-15 ENCOUNTER — TELEPHONE (OUTPATIENT)
Dept: INTERNAL MEDICINE | Facility: CLINIC | Age: 76
End: 2021-09-15

## 2021-09-15 DIAGNOSIS — E78.2 MIXED HYPERLIPIDEMIA: ICD-10-CM

## 2021-09-15 DIAGNOSIS — R79.9 ABNORMAL FINDING OF BLOOD CHEMISTRY, UNSPECIFIED: ICD-10-CM

## 2021-09-15 DIAGNOSIS — I10 ESSENTIAL HYPERTENSION: Primary | ICD-10-CM

## 2021-09-15 DIAGNOSIS — E74.39 GLUCOSE INTOLERANCE: ICD-10-CM

## 2021-09-15 NOTE — TELEPHONE ENCOUNTER
Telephone encounter to be sent to the clinical pool     Caller: Marleni Armando    Relationship: Self    Best call back number: [phone]    What orders are you requesting (i.e. lab or imaging): ACTIVE LAB ORDERS     In what timeframe would the patient need to come in: Friday 9/17    Where will you receive your lab/imaging services: CLINIC    Additional notes: PATIENT REQUESTS CALL BACK TO CONFIRM LAB ORDERS ARE ACTIVE.    PATIENT WOULD LIKE TO DO HER FASTING LABS THIS Friday TO  HAVE RESULTS AVAILABLE FOR UPCOMING APPOINTMENT     9/21/2021 Status: [facility]    Time: 8:00 AM Length: 30   Visit Type: SUBSEQUENT MEDICARE WELLNESS [1996] Copay: $0.00   Provider: Nilesh Vasquez APRN Department: [facility]       CSN:  [id number]   Referral Number:   Referral Status:     Notes: WELLNESS            “I do not see these orders in your chart. I will send a message to the clinical team. Please allow 48 hours for the clinical staff to follow up on this request.”

## 2021-09-15 NOTE — TELEPHONE ENCOUNTER
Caller: Marleni Armando    Relationship to patient: Self    Best call back number: [phone]  Chief complaint: NA    Type of visit: WELLNESS    Requested date: NEXT AVAILABLE    If rescheduling, when is the original appointment: 9/23/21    Additional notes: PROVIDER WAS NOT AVAILABLE.  PATIENT RESCHEDULED WITH     9/21/2021 Status: [place]    Time: 8:00 AM Length: 30   Visit Type: SUBSEQUENT MEDICARE WELLNESS [1996] Copay: $0.00   Provider: Nilesh Vasquez APRN Department: [facility]       CSN:  [id number]   Referral Number:   Referral Status:     Notes: WELLNESS         Thank you for sharing this information with me. I will send a message to the scheduling team. Please allow 48 hours for the  staff to follow up on this request.”

## 2021-09-17 ENCOUNTER — LAB (OUTPATIENT)
Dept: LAB | Facility: HOSPITAL | Age: 76
End: 2021-09-17

## 2021-09-17 LAB
ALBUMIN SERPL-MCNC: 4.4 G/DL (ref 3.5–5.2)
ALBUMIN/GLOB SERPL: 1.5 G/DL
ALP SERPL-CCNC: 62 U/L (ref 39–117)
ALT SERPL W P-5'-P-CCNC: 15 U/L (ref 1–33)
ANION GAP SERPL CALCULATED.3IONS-SCNC: 15.1 MMOL/L (ref 5–15)
AST SERPL-CCNC: 17 U/L (ref 1–32)
BILIRUB SERPL-MCNC: 0.3 MG/DL (ref 0–1.2)
BUN SERPL-MCNC: 15 MG/DL (ref 8–23)
BUN/CREAT SERPL: 14.4 (ref 7–25)
CALCIUM SPEC-SCNC: 9.3 MG/DL (ref 8.6–10.5)
CHLORIDE SERPL-SCNC: 101 MMOL/L (ref 98–107)
CHOLEST SERPL-MCNC: 187 MG/DL (ref 0–200)
CO2 SERPL-SCNC: 24.9 MMOL/L (ref 22–29)
CREAT SERPL-MCNC: 1.04 MG/DL (ref 0.57–1)
GFR SERPL CREATININE-BSD FRML MDRD: 63 ML/MIN/1.73
GLOBULIN UR ELPH-MCNC: 2.9 GM/DL
GLUCOSE SERPL-MCNC: 90 MG/DL (ref 65–99)
HBA1C MFR BLD: 6.22 % (ref 4.8–5.6)
HDLC SERPL-MCNC: 51 MG/DL (ref 40–60)
LDLC SERPL CALC-MCNC: 127 MG/DL (ref 0–100)
LDLC/HDLC SERPL: 2.48 {RATIO}
POTASSIUM SERPL-SCNC: 3.4 MMOL/L (ref 3.5–5.2)
PROT SERPL-MCNC: 7.3 G/DL (ref 6–8.5)
SODIUM SERPL-SCNC: 141 MMOL/L (ref 136–145)
TRIGL SERPL-MCNC: 47 MG/DL (ref 0–150)
VLDLC SERPL-MCNC: 9 MG/DL (ref 5–40)

## 2021-09-17 PROCEDURE — 80061 LIPID PANEL: CPT | Performed by: INTERNAL MEDICINE

## 2021-09-17 PROCEDURE — 83036 HEMOGLOBIN GLYCOSYLATED A1C: CPT | Performed by: INTERNAL MEDICINE

## 2021-09-17 PROCEDURE — 80053 COMPREHEN METABOLIC PANEL: CPT | Performed by: INTERNAL MEDICINE

## 2021-09-21 ENCOUNTER — OFFICE VISIT (OUTPATIENT)
Dept: INTERNAL MEDICINE | Facility: CLINIC | Age: 76
End: 2021-09-21

## 2021-09-21 VITALS
WEIGHT: 152 LBS | OXYGEN SATURATION: 98 % | HEIGHT: 61 IN | SYSTOLIC BLOOD PRESSURE: 120 MMHG | BODY MASS INDEX: 28.7 KG/M2 | HEART RATE: 54 BPM | TEMPERATURE: 96.8 F | DIASTOLIC BLOOD PRESSURE: 64 MMHG

## 2021-09-21 DIAGNOSIS — R73.03 PREDIABETES: ICD-10-CM

## 2021-09-21 DIAGNOSIS — Z12.11 SCREENING FOR COLON CANCER: ICD-10-CM

## 2021-09-21 DIAGNOSIS — Z00.00 MEDICARE ANNUAL WELLNESS VISIT, SUBSEQUENT: Primary | ICD-10-CM

## 2021-09-21 DIAGNOSIS — I10 ESSENTIAL HYPERTENSION: ICD-10-CM

## 2021-09-21 DIAGNOSIS — E78.2 MIXED HYPERLIPIDEMIA: ICD-10-CM

## 2021-09-21 PROCEDURE — 1159F MED LIST DOCD IN RCRD: CPT | Performed by: NURSE PRACTITIONER

## 2021-09-21 PROCEDURE — 1170F FXNL STATUS ASSESSED: CPT | Performed by: NURSE PRACTITIONER

## 2021-09-21 PROCEDURE — G0439 PPPS, SUBSEQ VISIT: HCPCS | Performed by: NURSE PRACTITIONER

## 2021-09-21 NOTE — PROGRESS NOTES
The ABCs of the Annual Wellness Visit  Subsequent Medicare Wellness Visit    Chief Complaint   Patient presents with   • Annual Exam       Subjective   History of Present Illness:  Marleni Armando is a 76 y.o. female who presents for a Subsequent Medicare Wellness Visit.    HEALTH RISK ASSESSMENT    Recent Hospitalizations:   No hospitalization(s) within the last year.    Current Medical Providers:  Patient Care Team:  Edison Herring MD as PCP - General    Smoking Status:  Social History     Tobacco Use   Smoking Status Never Smoker       Alcohol Consumption:  Social History     Substance and Sexual Activity   Alcohol Use Yes       Depression Screen:   PHQ-2/PHQ-9 Depression Screening 9/21/2021   Little interest or pleasure in doing things 0   Feeling down, depressed, or hopeless 0   Trouble falling or staying asleep, or sleeping too much 1   Feeling tired or having little energy 1   Poor appetite or overeating 0   Feeling bad about yourself - or that you are a failure or have let yourself or your family down 0   Trouble concentrating on things, such as reading the newspaper or watching television 0   Moving or speaking so slowly that other people could have noticed. Or the opposite - being so fidgety or restless that you have been moving around a lot more than usual 0   Thoughts that you would be better off dead, or of hurting yourself in some way 0   Total Score 2   If you checked off any problems, how difficult have these problems made it for you to do your work, take care of things at home, or get along with other people? Not difficult at all       Fall Risk Screen:  TUNGADI Fall Risk Assessment was completed, and patient is at LOW risk for falls.Assessment completed on:9/21/2021    Health Habits and Functional and Cognitive Screening:  Functional & Cognitive Status 9/21/2021   Do you have difficulty preparing food and eating? No   Do you have difficulty bathing yourself, getting dressed or grooming  yourself? No   Do you have difficulty using the toilet? No   Do you have difficulty moving around from place to place? No   Do you have trouble with steps or getting out of a bed or a chair? No   Current Diet Well Balanced Diet   Dental Exam Up to date   Eye Exam Up to date   Exercise (times per week) 0 times per week   Do you need help using the phone?  No   Are you deaf or do you have serious difficulty hearing?  No   Do you need help with transportation? No   Do you need help shopping? No   Do you need help preparing meals?  No   Do you need help with housework?  No   Do you need help with laundry? No   Do you need help taking your medications? No   Do you need help managing money? No   Do you ever drive or ride in a car without wearing a seat belt? No   Have you felt unusual stress, anger or loneliness in the last month? No   Who do you live with? Spouse   If you need help, do you have trouble finding someone available to you? No   Have you been bothered in the last four weeks by sexual problems? Yes   Do you have difficulty concentrating, remembering or making decisions? No         Does the patient have evidence of cognitive impairment? No    Asprin use counseling:Taking ASA appropriately as indicated    Age-appropriate Screening Schedule:  Refer to the list below for future screening recommendations based on patient's age, sex and/or medical conditions. Orders for these recommended tests are listed in the plan section. The patient has been provided with a written plan.    Health Maintenance   Topic Date Due   • TDAP/TD VACCINES (2 - Tdap) 10/21/2008   • INFLUENZA VACCINE  10/01/2021   • DXA SCAN  07/27/2022   • LIPID PANEL  09/17/2022   • MAMMOGRAM  02/25/2023   • ZOSTER VACCINE  Discontinued          The following portions of the patient's history were reviewed and updated as appropriate: allergies, current medications, past family history, past medical history, past social history, past surgical history and  "problem list.    Outpatient Medications Prior to Visit   Medication Sig Dispense Refill   • amLODIPine (NORVASC) 5 MG tablet TAKE 1 TABLET DAILY 90 tablet 3   • ammonium lactate (LAC-HYDRIN) 12 % lotion Apply  topically.     • aspirin 81 MG tablet Take  by mouth daily.     • azelastine (ASTELIN) 0.1 % nasal spray azelastine 137 mcg (0.1 %) nasal spray aerosol   U 1 SPR IEN BID     • hydroCHLOROthiazide (HYDRODIURIL) 25 MG tablet TAKE 1 TABLET DAILY 90 tablet 3   • omeprazole (priLOSEC) 40 MG capsule TAKE 1 CAPSULE EVERY MORNING 90 capsule 3   • ondansetron ODT (Zofran ODT) 4 MG disintegrating tablet Place 1 tablet on the tongue Every 8 (Eight) Hours As Needed for Nausea or Vomiting. 15 tablet 1   • pravastatin (PRAVACHOL) 80 MG tablet Take 1 tablet by mouth Every Night. 90 tablet 3     No facility-administered medications prior to visit.       Patient Active Problem List   Diagnosis   • Gastroesophageal reflux disease without esophagitis   • Impaired glucose tolerance   • Hyperlipidemia   • Hypertension   • Osteoporosis   • Asteatosis cutis   • Cystic lesion of abdominal viscera       Advanced Care Planning:  ACP discussion was declined by the patient. Patient does not have an advance directive, declines further assistance.    Review of Systems    Compared to one year ago, the patient feels her physical health is the same.  Compared to one year ago, the patient feels her mental health is the same.    Reviewed chart for potential of high risk medication in the elderly: yes  Reviewed chart for potential of harmful drug interactions in the elderly:yes    Objective         Vitals:    09/21/21 0755   BP: 120/64   BP Location: Left arm   Patient Position: Sitting   Pulse: 54   Temp: 96.8 °F (36 °C)   TempSrc: Infrared   SpO2: 98%   Weight: 68.9 kg (152 lb)   Height: 154.9 cm (60.98\")   PainSc: 0-No pain       Body mass index is 28.74 kg/m².  Discussed the patient's BMI with her. The BMI is above average; BMI management " plan is completed.    Physical Exam    Lab Results   Component Value Date    TRIG 47 09/17/2021    HDL 51 09/17/2021     (H) 09/17/2021    VLDL 9 09/17/2021    HGBA1C 6.22 (H) 09/17/2021        Assessment/Plan   Medicare Risks and Personalized Health Plan  CMS Preventative Services Quick Reference  Advance Directive Discussion  Breast Cancer/Mammogram Screening    The above risks/problems have been discussed with the patient.  Pertinent information has been shared with the patient in the After Visit Summary.  Follow up plans and orders are seen below in the Assessment/Plan Section.    Diagnoses and all orders for this visit:    1. Medicare annual wellness visit, subsequent (Primary)    2. Essential hypertension    3. Mixed hyperlipidemia    4. Prediabetes    5. Screening for colon cancer  -     Cologuard - Stool, Per Rectum; Future    mammo due in 2022  DEXA due next year  Had recent labs - discussed with pt  Most recent colonoscopy on file 2013, normal, pt is adiment she has had once since then  Order Cologuard - d/w pt if positive would refer for colonscopy  Needs PNA vax, none available in the office at this time  Declines assistance with advanced directive at this time  No tobacco/ETOH      Follow Up:  Return in about 6 months (around 3/21/2022) for Recheck.     An After Visit Summary and PPPS were given to the patient.

## 2021-11-03 ENCOUNTER — TELEPHONE (OUTPATIENT)
Dept: INTERNAL MEDICINE | Facility: CLINIC | Age: 76
End: 2021-11-03

## 2021-11-08 ENCOUNTER — OFFICE VISIT (OUTPATIENT)
Dept: OBSTETRICS AND GYNECOLOGY | Facility: CLINIC | Age: 76
End: 2021-11-08

## 2021-11-08 VITALS
BODY MASS INDEX: 28.55 KG/M2 | SYSTOLIC BLOOD PRESSURE: 122 MMHG | WEIGHT: 151 LBS | RESPIRATION RATE: 16 BRPM | DIASTOLIC BLOOD PRESSURE: 80 MMHG

## 2021-11-08 DIAGNOSIS — N95.2 VAGINAL ATROPHY: Primary | ICD-10-CM

## 2021-11-08 PROCEDURE — 99212 OFFICE O/P EST SF 10 MIN: CPT | Performed by: NURSE PRACTITIONER

## 2021-11-08 RX ORDER — AMLODIPINE BESYLATE 5 MG/1
TABLET ORAL
Qty: 90 TABLET | Refills: 3 | Status: SHIPPED | OUTPATIENT
Start: 2021-11-08 | End: 2022-11-02

## 2021-11-08 RX ORDER — CHLORAL HYDRATE 500 MG
CAPSULE ORAL
COMMUNITY

## 2021-11-08 RX ORDER — HYDROCHLOROTHIAZIDE 25 MG/1
TABLET ORAL
Qty: 90 TABLET | Refills: 3 | Status: SHIPPED | OUTPATIENT
Start: 2021-11-08 | End: 2022-03-21

## 2021-11-08 RX ORDER — MULTIPLE VITAMINS W/ MINERALS TAB 9MG-400MCG
1 TAB ORAL DAILY
COMMUNITY

## 2021-11-08 RX ORDER — CONJUGATED ESTROGENS 0.62 MG/G
CREAM VAGINAL
Qty: 30 G | Refills: 4 | Status: SHIPPED | OUTPATIENT
Start: 2021-11-08 | End: 2022-07-05 | Stop reason: SDUPTHER

## 2021-11-08 NOTE — PROGRESS NOTES
Problem Visit     Patient Name: Marleni Armando  : 1945   MRN: [id number]   Care Team: Patient Care Team:  Edison Herring MD as PCP - General    Chief Complaint:    Left side vaginal and groin pain     HPI: Marleni Armando is a 76 y.o. year old  presenting to be seen for vaginal and groin pain x 2 wks now.   States pain starts at the vulva on the left side and radiates to groin on left side only   If she presses on the area, it is not tender   Pain is random and intermittent - resolves without intervention   She describes as an irritation and discomfort   No vaginal d/c with itch, odor, or burning   No urinary c/o   No pelvic pain   Vaginal dryness is bothersome to her   S/p total hyst with unilateral SO - unsure which ovary remains       Subjective      /80   Resp 16   Wt 68.5 kg (151 lb)   LMP  (LMP Unknown)   Breastfeeding No   BMI 28.55 kg/m²     BMI reviewed: Body mass index is 28.55 kg/m².      Objective     Physical Exam      Neuro: alert and oriented to person, place and time   General:  alert; cooperative; well developed; well nourished   Skin:  Not performed.   Thyroid: not examined   Lungs:  breathing is unlabored   Heart:  Not performed.   Breasts:  Not performed.   Abdomen: Not performed.   Pelvis: Clinical staff was present for exam  External genitalia:  symmetrical areas of pallor between labial folds   :  urethral meatus normal;  Vaginal:  atrophic mucosal changes are present; atrophy with d/c noted  Cervix:  absent.  Uterus:  absent.  Adnexa:  non palpable bilaterally.  Rectal:  digital rectal exam not performed; anus visually normal appearing.         Assessment / Plan      Assessment  Problems Addressed This Visit    ICD-10-CM ICD-9-CM   1. Vaginal atrophy  N95.2 627.3       Plan    Discussed exam findings c/w atrophy   Groin without any abnormalities visualized or palpated - discomfort is not present with palpation   Will treat atrophy and have her f/u in 2  months with one of the physicians in case her sx do not resolve   Sample of premarin cream given with instructions for twice weekly use at bedtime - script to pharmacy   She will call with questions/concerns before f/u            Follow Up  No follow-ups on file.  Patient was given instructions and counseling regarding her condition or for health maintenance advice. Please see specific information pulled into the AVS if appropriate.     Lorrie Burch, SOPHIA  November 8, 2021  14:59 EST

## 2021-11-10 ENCOUNTER — TELEPHONE (OUTPATIENT)
Dept: OBSTETRICS AND GYNECOLOGY | Facility: CLINIC | Age: 76
End: 2021-11-10

## 2022-01-09 PROBLEM — Z01.419 WELL WOMAN EXAM: Status: ACTIVE | Noted: 2022-01-09

## 2022-01-10 ENCOUNTER — OFFICE VISIT (OUTPATIENT)
Dept: OBSTETRICS AND GYNECOLOGY | Facility: CLINIC | Age: 77
End: 2022-01-10

## 2022-01-10 VITALS
SYSTOLIC BLOOD PRESSURE: 136 MMHG | BODY MASS INDEX: 28.51 KG/M2 | HEIGHT: 61 IN | WEIGHT: 151 LBS | DIASTOLIC BLOOD PRESSURE: 74 MMHG

## 2022-01-10 DIAGNOSIS — N95.8 GENITOURINARY SYNDROME OF MENOPAUSE: Primary | ICD-10-CM

## 2022-01-10 PROCEDURE — 99213 OFFICE O/P EST LOW 20 MIN: CPT | Performed by: OBSTETRICS & GYNECOLOGY

## 2022-01-10 NOTE — PROGRESS NOTES
"Subjective   Chief Complaint   Patient presents with   • Follow-up     f/u for vag atrophy. saw Lorrie Burch 21.      Marleni Armando is a 76 y.o. year old .  No LMP recorded (lmp unknown). Patient has had a hysterectomy.  She presents to be seen because of follow up vaginal atrophy that was diagnosed by APRN on 21. She presented then reporting some issues with irritation on the \"outside vagina and near the left groin\". She reports she has been using premarin vaginal estrogen cream twice weekly at night intravaginally since then. She reports the outside irritation has improved some but really only occurs infrequently and is not too bothersome especially the area on the left groin. No vaginal bleeding.     OTHER THINGS SHE WANTS TO DISCUSS TODAY:  Nothing else    The following portions of the patient's history were reviewed and updated as appropriate:current medications and allergies    Social History    Tobacco Use      Smoking status: Never Smoker      Smokeless tobacco: Not on file    Review of Systems  Constitutional POS: nothing reported    NEG: anorexia or night sweats   Genitourinary POS: nothing reported    NEG: dysuria or hematuria   Gastointestinal POS: nothing reported    NEG: bloating, change in bowel habits, melena or reflux symptoms   Integument POS: nothing reported    NEG: moles that are changing in size, shape, color or rashes   Breast POS: nothing reported    NEG: persistent breast lump, skin dimpling or nipple discharge         Objective   Ht 154.9 cm (61\")   Wt 68.5 kg (151 lb)   LMP  (LMP Unknown)   Breastfeeding No   BMI 28.53 kg/m²     General:  well developed; well nourished  no acute distress   Skin:  Not performed.   Thyroid: not examined   Lungs:  breathing is unlabored   Heart:  Not performed.   Breasts:  Not performed.   Abdomen: Not performed.   Pelvis: Clinical staff was present for exam  External genitalia:  Bilateral vulva atrophy present with " hypopigmentation as well. Left groin appears and palpates normal   :  urethral meatus normal;  Vaginal:  atrophic mucosal changes are present;  Cervix:  absent.     Lab Review   No data reviewed    Imaging   No data reviewed        Assessment   1. Genitourinary syndrome of menopause      Plan   1. Recommend continued use of premarin vaginal estrogen cream twice weekly at night and to apply pea size amount to outer irritation. Will follow up in ~ 6 weeks to my baseline exam to see if improvement.   2. The importance of keeping all planned follow-up and taking all medications as prescribed was emphasized.  3. Follow up for recheck of above in ~6 weeks. Will need to schedule annual exam at that time as well.     No orders of the defined types were placed in this encounter.         This note was electronically signed.    Nehal Iraheta MD  January 10, 2022

## 2022-02-01 ENCOUNTER — TELEPHONE (OUTPATIENT)
Dept: INTERNAL MEDICINE | Facility: CLINIC | Age: 77
End: 2022-02-01

## 2022-02-01 NOTE — TELEPHONE ENCOUNTER
Caller: Marleni Armando    Relationship: Self    Best call back number: [phone]    What form or medical record are you requesting: LETTER THAT STATES THAT THE PATIENT IS ABLE TO WORK IN THE  THAT ALSO HAS THE DATE OF HER LAST APPOINTMENT  Who is requesting this form or medical record from you: [facility]     How would you like to receive the form or medical records (pick-up, mail, fax): FAX [phone] ATTENTION TANIA MATTHEW    Timeframe paperwork needed: AS SOON AS POSSIBLE     Additional notes: PLEASE CALL THE PATIENT TO LET HER KNOW IF THAT CAN BE DONE AND IF IT WAS SENT OVER

## 2022-02-02 ENCOUNTER — TELEPHONE (OUTPATIENT)
Dept: INTERNAL MEDICINE | Facility: CLINIC | Age: 77
End: 2022-02-02

## 2022-02-02 NOTE — TELEPHONE ENCOUNTER
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 IT HAS TO BE ATTN TO TANIA AND INCLUDE HER LAST APPOINTMENT/PHYSICAL(WELLNESS) DATE     ATTN: TANIA MARKS  LAST SEEN 09/21/21     PATIENT IS ABLE TO WORK AT THE [facility], WITHOUT RESTRICTIONS BECAUSE SHE HAS BEEN CLEARED AS PHYSICALLY FIT TO DO ALL DUTIES.    PLEASE ADVISE AND CALL PATIENT WITH ANY QUESTIONS OR IF MORE INFO IS NEEDED

## 2022-03-21 ENCOUNTER — OFFICE VISIT (OUTPATIENT)
Dept: INTERNAL MEDICINE | Facility: CLINIC | Age: 77
End: 2022-03-21

## 2022-03-21 ENCOUNTER — LAB (OUTPATIENT)
Dept: LAB | Facility: HOSPITAL | Age: 77
End: 2022-03-21

## 2022-03-21 VITALS
BODY MASS INDEX: 27.38 KG/M2 | HEART RATE: 57 BPM | TEMPERATURE: 96.9 F | HEIGHT: 61 IN | DIASTOLIC BLOOD PRESSURE: 70 MMHG | WEIGHT: 145 LBS | OXYGEN SATURATION: 100 % | SYSTOLIC BLOOD PRESSURE: 150 MMHG

## 2022-03-21 DIAGNOSIS — K21.9 GASTROESOPHAGEAL REFLUX DISEASE WITHOUT ESOPHAGITIS: ICD-10-CM

## 2022-03-21 DIAGNOSIS — M80.00XD OSTEOPOROSIS WITH CURRENT PATHOLOGICAL FRACTURE WITH ROUTINE HEALING, UNSPECIFIED OSTEOPOROSIS TYPE, SUBSEQUENT ENCOUNTER: ICD-10-CM

## 2022-03-21 DIAGNOSIS — E78.2 MIXED HYPERLIPIDEMIA: ICD-10-CM

## 2022-03-21 DIAGNOSIS — R73.02 IMPAIRED GLUCOSE TOLERANCE: ICD-10-CM

## 2022-03-21 DIAGNOSIS — I10 PRIMARY HYPERTENSION: Primary | ICD-10-CM

## 2022-03-21 DIAGNOSIS — E55.9 VITAMIN D DEFICIENCY: ICD-10-CM

## 2022-03-21 LAB
25(OH)D3 SERPL-MCNC: 46.1 NG/ML (ref 30–100)
ALBUMIN SERPL-MCNC: 4.4 G/DL (ref 3.5–5.2)
ALBUMIN/GLOB SERPL: 1.6 G/DL
ALP SERPL-CCNC: 54 U/L (ref 39–117)
ALT SERPL W P-5'-P-CCNC: 16 U/L (ref 1–33)
ANION GAP SERPL CALCULATED.3IONS-SCNC: 10.4 MMOL/L (ref 5–15)
AST SERPL-CCNC: 20 U/L (ref 1–32)
BILIRUB SERPL-MCNC: 0.3 MG/DL (ref 0–1.2)
BUN SERPL-MCNC: 12 MG/DL (ref 8–23)
BUN/CREAT SERPL: 11.7 (ref 7–25)
CALCIUM SPEC-SCNC: 9.7 MG/DL (ref 8.6–10.5)
CHLORIDE SERPL-SCNC: 103 MMOL/L (ref 98–107)
CHOLEST SERPL-MCNC: 174 MG/DL (ref 0–200)
CO2 SERPL-SCNC: 26.6 MMOL/L (ref 22–29)
CREAT SERPL-MCNC: 1.03 MG/DL (ref 0.57–1)
DEPRECATED RDW RBC AUTO: 43.1 FL (ref 37–54)
EGFRCR SERPLBLD CKD-EPI 2021: 57.2 ML/MIN/1.73
ERYTHROCYTE [DISTWIDTH] IN BLOOD BY AUTOMATED COUNT: 14.5 % (ref 12.3–15.4)
GLOBULIN UR ELPH-MCNC: 2.8 GM/DL
GLUCOSE SERPL-MCNC: 105 MG/DL (ref 65–99)
HBA1C MFR BLD: 6.3 % (ref 4.8–5.6)
HCT VFR BLD AUTO: 41.9 % (ref 34–46.6)
HDLC SERPL-MCNC: 57 MG/DL (ref 40–60)
HGB BLD-MCNC: 13.6 G/DL (ref 12–15.9)
LDLC SERPL CALC-MCNC: 106 MG/DL (ref 0–100)
LDLC/HDLC SERPL: 1.86 {RATIO}
MCH RBC QN AUTO: 26.9 PG (ref 26.6–33)
MCHC RBC AUTO-ENTMCNC: 32.5 G/DL (ref 31.5–35.7)
MCV RBC AUTO: 82.8 FL (ref 79–97)
PLATELET # BLD AUTO: 373 10*3/MM3 (ref 140–450)
PMV BLD AUTO: 9.3 FL (ref 6–12)
POTASSIUM SERPL-SCNC: 3.7 MMOL/L (ref 3.5–5.2)
PROT SERPL-MCNC: 7.2 G/DL (ref 6–8.5)
RBC # BLD AUTO: 5.06 10*6/MM3 (ref 3.77–5.28)
SODIUM SERPL-SCNC: 140 MMOL/L (ref 136–145)
TRIGL SERPL-MCNC: 56 MG/DL (ref 0–150)
TSH SERPL DL<=0.05 MIU/L-ACNC: 1.74 UIU/ML (ref 0.27–4.2)
VIT B12 BLD-MCNC: 1265 PG/ML (ref 211–946)
VLDLC SERPL-MCNC: 11 MG/DL (ref 5–40)
WBC NRBC COR # BLD: 6.9 10*3/MM3 (ref 3.4–10.8)

## 2022-03-21 PROCEDURE — 84443 ASSAY THYROID STIM HORMONE: CPT | Performed by: INTERNAL MEDICINE

## 2022-03-21 PROCEDURE — 85027 COMPLETE CBC AUTOMATED: CPT | Performed by: INTERNAL MEDICINE

## 2022-03-21 PROCEDURE — 80061 LIPID PANEL: CPT | Performed by: INTERNAL MEDICINE

## 2022-03-21 PROCEDURE — 82607 VITAMIN B-12: CPT | Performed by: INTERNAL MEDICINE

## 2022-03-21 PROCEDURE — 80053 COMPREHEN METABOLIC PANEL: CPT | Performed by: INTERNAL MEDICINE

## 2022-03-21 PROCEDURE — 83036 HEMOGLOBIN GLYCOSYLATED A1C: CPT | Performed by: INTERNAL MEDICINE

## 2022-03-21 PROCEDURE — 99214 OFFICE O/P EST MOD 30 MIN: CPT | Performed by: INTERNAL MEDICINE

## 2022-03-21 PROCEDURE — 82306 VITAMIN D 25 HYDROXY: CPT | Performed by: INTERNAL MEDICINE

## 2022-03-21 RX ORDER — CHLORTHALIDONE 25 MG/1
25 TABLET ORAL EVERY MORNING
Qty: 90 TABLET | Refills: 3 | Status: SHIPPED | OUTPATIENT
Start: 2022-03-21 | End: 2023-02-21

## 2022-03-21 RX ORDER — MECLIZINE HYDROCHLORIDE 25 MG/1
TABLET ORAL
Qty: 30 TABLET | Refills: 2 | Status: SHIPPED | OUTPATIENT
Start: 2022-03-21

## 2022-03-21 NOTE — PROGRESS NOTES
Patient is a 76 y.o. female who is here for a follow up of hyperlipidemia and hypertension.  Chief Complaint   Patient presents with   • Hyperlipidemia   • Hypertension         HPI:    Here for mgmt of HTN and hyperlipidemia and GERD.  BP has been good.  No dizziness or lightheadedness.  GERD is controlled.  No nausea or emesis.  No CP.  Has lost some weight.  Sleeping well.      History:     Patient Active Problem List   Diagnosis   • Gastroesophageal reflux disease without esophagitis   • Impaired glucose tolerance   • Hyperlipidemia   • Hypertension   • Osteoporosis   • Asteatosis cutis   • Cystic lesion of abdominal viscera   • Well woman exam       Past Medical History:   Diagnosis Date   • Arthritis    • Hyperlipidemia    • Hypertension        Past Surgical History:   Procedure Laterality Date   • HYSTERECTOMY  11/1993    and REG   • TOTAL ABDOMINAL HYSTERECTOMY         Current Outpatient Medications on File Prior to Visit   Medication Sig   • amLODIPine (NORVASC) 5 MG tablet TAKE 1 TABLET DAILY   • ammonium lactate (LAC-HYDRIN) 12 % lotion Apply  topically.   • aspirin 81 MG tablet Take  by mouth daily.   • azelastine (ASTELIN) 0.1 % nasal spray azelastine 137 mcg (0.1 %) nasal spray aerosol   U 1 SPR IEN BID   • conjugated estrogens (Premarin) 0.625 MG/GM vaginal cream Use 0.5 grams intravaginally 2 times per week at bedtime.   • multivitamin with minerals tablet tablet Take 1 tablet by mouth Daily.   • Omega-3 1000 MG capsule Take  by mouth.   • omeprazole (priLOSEC) 40 MG capsule TAKE 1 CAPSULE EVERY MORNING   • ondansetron ODT (Zofran ODT) 4 MG disintegrating tablet Place 1 tablet on the tongue Every 8 (Eight) Hours As Needed for Nausea or Vomiting.   • pravastatin (PRAVACHOL) 80 MG tablet Take 1 tablet by mouth Every Night.     No current facility-administered medications on file prior to visit.       Family History   Problem Relation Age of Onset   • Diabetes Other    • Kidney disease Other    •  "Hypertension Other        Social History     Socioeconomic History   • Marital status:    Tobacco Use   • Smoking status: Never Smoker   Vaping Use   • Vaping Use: Never used   Substance and Sexual Activity   • Alcohol use: Not Currently   • Sexual activity: Yes         Review of Systems   Constitutional: Positive for unexpected weight change. Negative for chills, diaphoresis, fatigue and fever.   HENT: Negative for congestion, ear pain, hearing loss, nosebleeds, postnasal drip, rhinorrhea, sinus pressure and sore throat.    Eyes: Negative for pain, discharge and itching.   Respiratory: Negative for cough, chest tightness, shortness of breath and wheezing.    Cardiovascular: Negative for chest pain, palpitations and leg swelling.   Gastrointestinal: Negative for abdominal distention, abdominal pain, blood in stool, constipation, diarrhea, nausea and vomiting.        1/13 colonoscopy normal   Endocrine: Negative for polydipsia and polyuria.   Genitourinary: Negative for difficulty urinating, dysuria, frequency and hematuria.        3/22 mammogram and followed by GYN   Musculoskeletal: Positive for arthralgias. Negative for back pain, gait problem, joint swelling and myalgias.        See HPI   Skin: Negative for rash and wound.   Neurological: Positive for dizziness (epsodic). Negative for syncope, weakness and headaches.   Psychiatric/Behavioral: Negative for dysphoric mood and sleep disturbance. The patient is not nervous/anxious.        /70   Pulse 57   Temp 96.9 °F (36.1 °C) (Infrared)   Ht 154.9 cm (60.98\")   Wt 65.8 kg (145 lb)   LMP  (LMP Unknown)   SpO2 100%   BMI 27.41 kg/m²       Physical Exam  Constitutional:       Appearance: Normal appearance. She is well-developed.   HENT:      Head: Normocephalic and atraumatic.      Right Ear: External ear normal.      Left Ear: External ear normal.      Nose: Nose normal.      Mouth/Throat:      Mouth: Mucous membranes are moist.      Pharynx: " Oropharynx is clear.   Eyes:      Extraocular Movements: Extraocular movements intact.      Conjunctiva/sclera: Conjunctivae normal.      Pupils: Pupils are equal, round, and reactive to light.   Cardiovascular:      Rate and Rhythm: Normal rate and regular rhythm.      Heart sounds: Normal heart sounds.   Pulmonary:      Effort: Pulmonary effort is normal.      Breath sounds: Normal breath sounds.   Abdominal:      General: Bowel sounds are normal.      Palpations: Abdomen is soft.   Musculoskeletal:         General: Normal range of motion.      Cervical back: Normal range of motion and neck supple.   Lymphadenopathy:      Cervical: No cervical adenopathy.   Skin:     General: Skin is warm and dry.   Neurological:      General: No focal deficit present.      Mental Status: She is alert and oriented to person, place, and time.   Psychiatric:         Mood and Affect: Mood normal.         Behavior: Behavior normal.         Thought Content: Thought content normal.         Procedure:      Discussion/Summary:    htn-cont on CCB, change to chlorthatlidone, goal of 130/80  hyperlipidemia-labs today on pravachol improved  gerd-stable on omeprazole  glucose intolerance-labs today and advised to watch carbs  osteoporosis-DEXA from 7/20 dw patient, improved  xerosis-lachydrin prn, stable  DJD-tylenol prn, add diclofenac gel      3/21 labs noted and dw patient    Current Outpatient Medications:   •  amLODIPine (NORVASC) 5 MG tablet, TAKE 1 TABLET DAILY, Disp: 90 tablet, Rfl: 3  •  ammonium lactate (LAC-HYDRIN) 12 % lotion, Apply  topically., Disp: , Rfl:   •  aspirin 81 MG tablet, Take  by mouth daily., Disp: , Rfl:   •  azelastine (ASTELIN) 0.1 % nasal spray, azelastine 137 mcg (0.1 %) nasal spray aerosol  U 1 SPR IEN BID, Disp: , Rfl:   •  conjugated estrogens (Premarin) 0.625 MG/GM vaginal cream, Use 0.5 grams intravaginally 2 times per week at bedtime., Disp: 30 g, Rfl: 4  •  multivitamin with minerals tablet tablet, Take 1  tablet by mouth Daily., Disp: , Rfl:   •  Omega-3 1000 MG capsule, Take  by mouth., Disp: , Rfl:   •  omeprazole (priLOSEC) 40 MG capsule, TAKE 1 CAPSULE EVERY MORNING, Disp: 90 capsule, Rfl: 3  •  ondansetron ODT (Zofran ODT) 4 MG disintegrating tablet, Place 1 tablet on the tongue Every 8 (Eight) Hours As Needed for Nausea or Vomiting., Disp: 15 tablet, Rfl: 1  •  pravastatin (PRAVACHOL) 80 MG tablet, Take 1 tablet by mouth Every Night., Disp: 90 tablet, Rfl: 3  •  chlorthalidone (HYGROTON) 25 MG tablet, Take 1 tablet by mouth Every Morning., Disp: 90 tablet, Rfl: 3  •  Diclofenac Sodium (VOLTAREN) 1 % gel gel, Apply 1 gram topically to indicated area 2 times daily as needed for mild to moderate pain., Disp: 100 g, Rfl: 2  •  meclizine (ANTIVERT) 25 MG tablet, Take 1/2-1 tablet by mouth 3 times daily as needed for dizziness., Disp: 30 tablet, Rfl: 2        Diagnoses and all orders for this visit:    1. Primary hypertension (Primary)  -     chlorthalidone (HYGROTON) 25 MG tablet; Take 1 tablet by mouth Every Morning.  Dispense: 90 tablet; Refill: 3  -     CBC (No Diff)    2. Mixed hyperlipidemia  -     Comprehensive Metabolic Panel  -     Lipid Panel  -     TSH    3. Impaired glucose tolerance  -     Hemoglobin A1c    4. Gastroesophageal reflux disease without esophagitis  -     Vitamin B12    5. Osteoporosis with current pathological fracture with routine healing, unspecified osteoporosis type, subsequent encounter  -     Vitamin D 25 Hydroxy    6. Vitamin D deficiency  -     Vitamin D 25 Hydroxy    Other orders  -     meclizine (ANTIVERT) 25 MG tablet; Take 1/2-1 tablet by mouth 3 times daily as needed for dizziness.  Dispense: 30 tablet; Refill: 2  -     Diclofenac Sodium (VOLTAREN) 1 % gel gel; Apply 1 gram topically to indicated area 2 times daily as needed for mild to moderate pain.  Dispense: 100 g; Refill: 2

## 2022-04-04 ENCOUNTER — OFFICE VISIT (OUTPATIENT)
Dept: OBSTETRICS AND GYNECOLOGY | Facility: CLINIC | Age: 77
End: 2022-04-04

## 2022-04-04 VITALS — SYSTOLIC BLOOD PRESSURE: 124 MMHG | WEIGHT: 145.4 LBS | DIASTOLIC BLOOD PRESSURE: 82 MMHG | BODY MASS INDEX: 27.49 KG/M2

## 2022-04-04 DIAGNOSIS — N95.8 GENITOURINARY SYNDROME OF MENOPAUSE: Primary | ICD-10-CM

## 2022-04-04 PROCEDURE — 99213 OFFICE O/P EST LOW 20 MIN: CPT | Performed by: OBSTETRICS & GYNECOLOGY

## 2022-04-04 NOTE — PROGRESS NOTES
Subjective   Chief Complaint   Patient presents with   • Follow-up     Vaginal atrophy / feels like it is getting better     Marleni Armando is a 76 y.o. year old .  No LMP recorded (lmp unknown). Patient has had a hysterectomy.  She presents to be seen because of follow up ov vaginal/vulvar atrophy and previously reported left groin pain. She states the groin pain is not longer present. She thinks the vulvar irritation has improved and she has no issues. She has been using a small amount less than 0.5 gram intravaginally and then a small amount on the bilateral irritated areas of the vulva about twice per week. She is happy where things are currently.     OTHER THINGS SHE WANTS TO DISCUSS TODAY:  Nothing else    The following portions of the patient's history were reviewed and updated as appropriate:current medications and allergies    Social History    Tobacco Use      Smoking status: Never Smoker      Smokeless tobacco: Not on file    Review of Systems  Constitutional POS: nothing reported    NEG: anorexia or night sweats   Genitourinary POS: nothing reported    NEG: dysuria or hematuria   Gastointestinal POS: nothing reported    NEG: bloating, change in bowel habits, melena or reflux symptoms   Integument POS: nothing reported    NEG: moles that are changing in size, shape, color or rashes   Breast POS: nothing reported    NEG: persistent breast lump, skin dimpling or nipple discharge         Objective   /82 (BP Location: Right arm, Patient Position: Sitting, Cuff Size: Large Adult)   Wt 66 kg (145 lb 6.4 oz)   LMP  (LMP Unknown)   BMI 27.49 kg/m²     General:  well developed; well nourished  no acute distress   Skin:  Not performed.   Thyroid: not examined   Lungs:  breathing is unlabored   Heart:  Not performed.   Breasts:  Not performed.   Abdomen: Not performed.   Pelvis: Clinical staff was present for exam  External genitalia: vulvar atrophy present bilaterally but improved with less skin  discoloration present.   :  urethral meatus normal;  Vaginal:  atrophic mucosal changes are present especially near left with  aspect of vaginal cuff with petechiae, loss of vaginal rugae   Cervix:  absent.     Lab Review   No data reviewed    Imaging   No data reviewed        Assessment   1. Genitourinary syndrome of menopause - clinically improved with premarin vaginal cream      Plan   1. Recommend she continue her vaginal estrogen cream twice weekly. Reviewed s/s for calling the office   2. The importance of keeping all planned follow-up and taking all medications as prescribed was emphasized.  3. Follow up for annual exam in ~ 3 months with first available provider and reviewed importance of yearly breast and pelvic exams.     No orders of the defined types were placed in this encounter.         This note was electronically signed.    Nehal Iraheta MD  April 4, 2022

## 2022-04-28 DIAGNOSIS — K21.9 GASTROESOPHAGEAL REFLUX DISEASE WITHOUT ESOPHAGITIS: ICD-10-CM

## 2022-04-28 RX ORDER — OMEPRAZOLE 40 MG/1
CAPSULE, DELAYED RELEASE ORAL
Qty: 90 CAPSULE | Refills: 3 | Status: SHIPPED | OUTPATIENT
Start: 2022-04-28

## 2022-05-11 ENCOUNTER — OFFICE VISIT (OUTPATIENT)
Dept: INTERNAL MEDICINE | Facility: CLINIC | Age: 77
End: 2022-05-11

## 2022-05-11 ENCOUNTER — LAB (OUTPATIENT)
Dept: LAB | Facility: HOSPITAL | Age: 77
End: 2022-05-11

## 2022-05-11 VITALS
HEIGHT: 61 IN | HEART RATE: 51 BPM | TEMPERATURE: 96.9 F | SYSTOLIC BLOOD PRESSURE: 130 MMHG | DIASTOLIC BLOOD PRESSURE: 70 MMHG | OXYGEN SATURATION: 100 % | BODY MASS INDEX: 27.38 KG/M2 | WEIGHT: 145 LBS

## 2022-05-11 DIAGNOSIS — R73.02 IMPAIRED GLUCOSE TOLERANCE: ICD-10-CM

## 2022-05-11 DIAGNOSIS — I10 PRIMARY HYPERTENSION: Primary | ICD-10-CM

## 2022-05-11 DIAGNOSIS — Z12.11 SCREEN FOR COLON CANCER: ICD-10-CM

## 2022-05-11 DIAGNOSIS — K21.9 GASTROESOPHAGEAL REFLUX DISEASE WITHOUT ESOPHAGITIS: ICD-10-CM

## 2022-05-11 DIAGNOSIS — E78.2 MIXED HYPERLIPIDEMIA: ICD-10-CM

## 2022-05-11 PROCEDURE — 99214 OFFICE O/P EST MOD 30 MIN: CPT | Performed by: INTERNAL MEDICINE

## 2022-05-11 PROCEDURE — 80048 BASIC METABOLIC PNL TOTAL CA: CPT | Performed by: INTERNAL MEDICINE

## 2022-05-11 NOTE — PROGRESS NOTES
Patient is a 76 y.o. female who is here for a follow up of hyperlipidemia and hypertension.  Chief Complaint   Patient presents with   • Hyperlipidemia   • Hypertension         HPI:    Here for mgmt of HTN and GERD and hyperlipidemia.  Trying to watch her diet.  Has pains in legs and occasional cramping.  Using tylenol for the pains.  No dizziness or lightheadedness.  No HAs.  No nausea or emesis.      History:     Patient Active Problem List   Diagnosis   • Gastroesophageal reflux disease without esophagitis   • Impaired glucose tolerance   • Hyperlipidemia   • Hypertension   • Osteoporosis   • Asteatosis cutis   • Cystic lesion of abdominal viscera   • Well woman exam       Past Medical History:   Diagnosis Date   • Arthritis    • Hyperlipidemia    • Hypertension        Past Surgical History:   Procedure Laterality Date   • HYSTERECTOMY  11/1993    and REG   • TOTAL ABDOMINAL HYSTERECTOMY         Current Outpatient Medications on File Prior to Visit   Medication Sig   • amLODIPine (NORVASC) 5 MG tablet TAKE 1 TABLET DAILY   • ammonium lactate (LAC-HYDRIN) 12 % lotion Apply  topically.   • aspirin 81 MG tablet Take  by mouth daily.   • azelastine (ASTELIN) 0.1 % nasal spray azelastine 137 mcg (0.1 %) nasal spray aerosol   U 1 SPR IEN BID   • chlorthalidone (HYGROTON) 25 MG tablet Take 1 tablet by mouth Every Morning.   • conjugated estrogens (Premarin) 0.625 MG/GM vaginal cream Use 0.5 grams intravaginally 2 times per week at bedtime.   • Diclofenac Sodium (VOLTAREN) 1 % gel gel Apply 1 gram topically to indicated area 2 times daily as needed for mild to moderate pain.   • meclizine (ANTIVERT) 25 MG tablet Take 1/2-1 tablet by mouth 3 times daily as needed for dizziness.   • multivitamin with minerals tablet tablet Take 1 tablet by mouth Daily.   • Omega-3 1000 MG capsule Take  by mouth.   • omeprazole (priLOSEC) 40 MG capsule TAKE 1 CAPSULE EVERY MORNING   • ondansetron ODT (Zofran ODT) 4 MG disintegrating tablet  "Place 1 tablet on the tongue Every 8 (Eight) Hours As Needed for Nausea or Vomiting.   • pravastatin (PRAVACHOL) 80 MG tablet Take 1 tablet by mouth Every Night.     No current facility-administered medications on file prior to visit.       Family History   Problem Relation Age of Onset   • Diabetes Other    • Kidney disease Other    • Hypertension Other        Social History     Socioeconomic History   • Marital status:    Tobacco Use   • Smoking status: Never Smoker   Vaping Use   • Vaping Use: Never used   Substance and Sexual Activity   • Alcohol use: Not Currently   • Sexual activity: Yes         Review of Systems   Constitutional: Negative for chills, diaphoresis, fatigue and fever.   HENT: Negative for congestion, ear pain, hearing loss, nosebleeds, postnasal drip, rhinorrhea, sinus pressure and sore throat.    Eyes: Negative for pain, discharge and itching.   Respiratory: Negative for cough, chest tightness, shortness of breath and wheezing.    Cardiovascular: Negative for chest pain, palpitations and leg swelling.   Gastrointestinal: Negative for abdominal distention, abdominal pain, blood in stool, constipation, diarrhea, nausea and vomiting.        1/13 colonoscopy normal   Endocrine: Negative for polydipsia and polyuria.   Genitourinary: Negative for difficulty urinating, dysuria, frequency and hematuria.        3/22 mammogram and followed by GYN   Musculoskeletal: Positive for arthralgias. Negative for back pain, gait problem, joint swelling and myalgias.        See HPI   Skin: Negative for rash and wound.   Neurological: Negative for syncope, weakness and headaches.   Psychiatric/Behavioral: Negative for dysphoric mood and sleep disturbance. The patient is not nervous/anxious.        /70   Pulse 51   Temp 96.9 °F (36.1 °C) (Infrared)   Ht 154.9 cm (60.98\")   Wt 65.8 kg (145 lb)   LMP  (LMP Unknown)   SpO2 100%   BMI 27.41 kg/m²       Physical Exam  Constitutional:       Appearance: " Normal appearance. She is well-developed.   HENT:      Head: Normocephalic and atraumatic.      Right Ear: External ear normal.      Left Ear: External ear normal.      Nose: Nose normal.      Mouth/Throat:      Mouth: Mucous membranes are moist.      Pharynx: Oropharynx is clear.   Eyes:      Extraocular Movements: Extraocular movements intact.      Conjunctiva/sclera: Conjunctivae normal.      Pupils: Pupils are equal, round, and reactive to light.   Cardiovascular:      Rate and Rhythm: Normal rate and regular rhythm.      Heart sounds: Normal heart sounds.   Pulmonary:      Effort: Pulmonary effort is normal.      Breath sounds: Normal breath sounds.   Abdominal:      General: Bowel sounds are normal.      Palpations: Abdomen is soft.   Musculoskeletal:         General: Normal range of motion.      Cervical back: Normal range of motion and neck supple.   Lymphadenopathy:      Cervical: No cervical adenopathy.   Skin:     General: Skin is warm and dry.   Neurological:      General: No focal deficit present.      Mental Status: She is alert and oriented to person, place, and time.   Psychiatric:         Mood and Affect: Mood normal.         Behavior: Behavior normal.         Thought Content: Thought content normal.         Procedure:      Discussion/Summary:    htn-cont on CCB and chlorthatlidone, goal of 130/80  hyperlipidemia-labs on pravachol improved  gerd-stable on omeprazole  glucose intolerance-labs at goal and advised to watch carbs  osteoporosis-DEXA from 7/20 dw patient, improved  xerosis-lachydrin prn, stable  DJD-tylenol prn, add diclofenac gel  Other-check BMP and schedule colonoscopy      5/11 labs noted and dw patient    Current Outpatient Medications:   •  amLODIPine (NORVASC) 5 MG tablet, TAKE 1 TABLET DAILY, Disp: 90 tablet, Rfl: 3  •  ammonium lactate (LAC-HYDRIN) 12 % lotion, Apply  topically., Disp: , Rfl:   •  aspirin 81 MG tablet, Take  by mouth daily., Disp: , Rfl:   •  azelastine (ASTELIN)  0.1 % nasal spray, azelastine 137 mcg (0.1 %) nasal spray aerosol  U 1 SPR IEN BID, Disp: , Rfl:   •  chlorthalidone (HYGROTON) 25 MG tablet, Take 1 tablet by mouth Every Morning., Disp: 90 tablet, Rfl: 3  •  conjugated estrogens (Premarin) 0.625 MG/GM vaginal cream, Use 0.5 grams intravaginally 2 times per week at bedtime., Disp: 30 g, Rfl: 4  •  Diclofenac Sodium (VOLTAREN) 1 % gel gel, Apply 1 gram topically to indicated area 2 times daily as needed for mild to moderate pain., Disp: 100 g, Rfl: 2  •  meclizine (ANTIVERT) 25 MG tablet, Take 1/2-1 tablet by mouth 3 times daily as needed for dizziness., Disp: 30 tablet, Rfl: 2  •  multivitamin with minerals tablet tablet, Take 1 tablet by mouth Daily., Disp: , Rfl:   •  Omega-3 1000 MG capsule, Take  by mouth., Disp: , Rfl:   •  omeprazole (priLOSEC) 40 MG capsule, TAKE 1 CAPSULE EVERY MORNING, Disp: 90 capsule, Rfl: 3  •  ondansetron ODT (Zofran ODT) 4 MG disintegrating tablet, Place 1 tablet on the tongue Every 8 (Eight) Hours As Needed for Nausea or Vomiting., Disp: 15 tablet, Rfl: 1  •  pravastatin (PRAVACHOL) 80 MG tablet, Take 1 tablet by mouth Every Night., Disp: 90 tablet, Rfl: 3        There are no diagnoses linked to this encounter.

## 2022-05-12 LAB
ANION GAP SERPL CALCULATED.3IONS-SCNC: 12.7 MMOL/L (ref 5–15)
BUN SERPL-MCNC: 20 MG/DL (ref 8–23)
BUN/CREAT SERPL: 19.4 (ref 7–25)
CALCIUM SPEC-SCNC: 9.5 MG/DL (ref 8.6–10.5)
CHLORIDE SERPL-SCNC: 100 MMOL/L (ref 98–107)
CO2 SERPL-SCNC: 23.3 MMOL/L (ref 22–29)
CREAT SERPL-MCNC: 1.03 MG/DL (ref 0.57–1)
EGFRCR SERPLBLD CKD-EPI 2021: 57.2 ML/MIN/1.73
GLUCOSE SERPL-MCNC: 86 MG/DL (ref 65–99)
POTASSIUM SERPL-SCNC: 3.7 MMOL/L (ref 3.5–5.2)
SODIUM SERPL-SCNC: 136 MMOL/L (ref 136–145)

## 2022-06-09 DIAGNOSIS — E78.2 MIXED HYPERLIPIDEMIA: ICD-10-CM

## 2022-06-10 RX ORDER — PRAVASTATIN SODIUM 80 MG/1
TABLET ORAL
Qty: 90 TABLET | Refills: 3 | Status: SHIPPED | OUTPATIENT
Start: 2022-06-10 | End: 2022-09-26

## 2022-06-29 ENCOUNTER — HOSPITAL ENCOUNTER (OUTPATIENT)
Dept: GENERAL RADIOLOGY | Facility: HOSPITAL | Age: 77
Discharge: HOME | End: 2022-06-29
Admitting: INTERNAL MEDICINE

## 2022-06-29 ENCOUNTER — TELEPHONE (OUTPATIENT)
Dept: INTERNAL MEDICINE | Facility: CLINIC | Age: 77
End: 2022-06-29

## 2022-06-29 DIAGNOSIS — M25.552 BILATERAL HIP PAIN: Primary | ICD-10-CM

## 2022-06-29 DIAGNOSIS — M25.551 BILATERAL HIP PAIN: Primary | ICD-10-CM

## 2022-06-29 PROCEDURE — 73522 X-RAY EXAM HIPS BI 3-4 VIEWS: CPT

## 2022-06-29 NOTE — TELEPHONE ENCOUNTER
Caller: Marleni Armando    Relationship: Self    Best call back number: [phone]    What was the call regarding:   PATIENT WOULD LIKE A CALL BACK REGARDING STARTING TO HAVE PAIN IN BOTH HER HIPS AND BOTH SIDES SINCE TAKING MEDICATION   chlorthalidone (HYGROTON) 25 MG tablet  AND WOULD LIKE TO BE INFORMED IF THIS MEDICATION COULD BE THE CAUSE OF THE PAIN IN HER HIPS AND SIDES     PATIENT STATED THAT SHE IS ALSO HAVING NERVE JUMPS IN RIGHT LEG     Do you require a callback: YES     Called with rec to get xray

## 2022-07-05 ENCOUNTER — OFFICE VISIT (OUTPATIENT)
Dept: OBSTETRICS AND GYNECOLOGY | Facility: CLINIC | Age: 77
End: 2022-07-05

## 2022-07-05 VITALS
HEIGHT: 60 IN | BODY MASS INDEX: 28.66 KG/M2 | DIASTOLIC BLOOD PRESSURE: 70 MMHG | SYSTOLIC BLOOD PRESSURE: 120 MMHG | WEIGHT: 146 LBS

## 2022-07-05 DIAGNOSIS — Z78.0 POSTMENOPAUSAL STATE: Primary | ICD-10-CM

## 2022-07-05 PROCEDURE — 99397 PER PM REEVAL EST PAT 65+ YR: CPT | Performed by: NURSE PRACTITIONER

## 2022-07-05 RX ORDER — CONJUGATED ESTROGENS 0.62 MG/G
CREAM VAGINAL
Qty: 30 G | Refills: 4 | Status: SHIPPED | OUTPATIENT
Start: 2022-07-05

## 2022-07-15 RX ORDER — SODIUM, POTASSIUM,MAG SULFATES 17.5-3.13G
2 SOLUTION, RECONSTITUTED, ORAL ORAL TAKE AS DIRECTED
Qty: 354 ML | Refills: 0 | Status: SHIPPED | OUTPATIENT
Start: 2022-07-15 | End: 2022-09-26

## 2022-07-27 ENCOUNTER — OFFICE VISIT (OUTPATIENT)
Dept: ORTHOPEDIC SURGERY | Facility: CLINIC | Age: 77
End: 2022-07-27

## 2022-07-27 VITALS
DIASTOLIC BLOOD PRESSURE: 68 MMHG | SYSTOLIC BLOOD PRESSURE: 120 MMHG | WEIGHT: 145.94 LBS | BODY MASS INDEX: 28.65 KG/M2 | HEIGHT: 60 IN

## 2022-07-27 DIAGNOSIS — M16.0 PRIMARY OSTEOARTHRITIS OF BOTH HIPS: Primary | ICD-10-CM

## 2022-07-27 PROCEDURE — 99204 OFFICE O/P NEW MOD 45 MIN: CPT | Performed by: ORTHOPAEDIC SURGERY

## 2022-07-27 RX ORDER — POLYETHYLENE GLYCOL 3350, SODIUM SULFATE ANHYDROUS, SODIUM BICARBONATE, SODIUM CHLORIDE, POTASSIUM CHLORIDE 236; 22.74; 6.74; 5.86; 2.97 G/4L; G/4L; G/4L; G/4L; G/4L
POWDER, FOR SOLUTION ORAL
COMMUNITY
Start: 2022-07-23 | End: 2022-09-26

## 2022-07-27 NOTE — PROGRESS NOTES
[facility]    Subjective     Chief Complaint   Patient presents with   • Left Hip - Pain   • Right Hip - Pain        HPI    Marleni Armando is a 77 y.o. female who presents with new problem of: bilateral hip pain.  Onset: atraumatic and gradual in nature. The issue has been ongoing for 3 month(s). Pain is a 8/10 on the pain scale. Pain is described as aching. Associated symptoms include pain and stiffness. The pain is worse with walking, standing, sitting and leisure; pain medication and/or NSAID improve the pain. Previous treatments have included: NSAIDS.  Pain is located in the posterior aspect of the hip in the SI joint region, as well as in the groin region bilaterally.  She had x-rays obtained prior to referral that showed arthritic changes.    I have reviewed the following portions of the patient's history and agree with: History of Present Illness and Review of Systems    Patient Active Problem List   Diagnosis   • Gastroesophageal reflux disease without esophagitis   • Impaired glucose tolerance   • Hyperlipidemia   • Hypertension   • Osteoporosis   • Asteatosis cutis   • Cystic lesion of abdominal viscera   • Well woman exam     Past Medical History:   Diagnosis Date   • Arthritis    • Hyperlipidemia    • Hypertension       Past Surgical History:   Procedure Laterality Date   • HYSTERECTOMY  11/1993    and REG   • TOTAL ABDOMINAL HYSTERECTOMY        Family History   Problem Relation Age of Onset   • Diabetes Other    • Kidney disease Other    • Hypertension Other      Social History     Socioeconomic History   • Marital status:    • Number of children: 1   Tobacco Use   • Smoking status: Never Smoker   • Smokeless tobacco: Never Used   Vaping Use   • Vaping Use: Never used   Substance and Sexual Activity   • Alcohol use: Not Currently   • Drug use: Never   • Sexual activity: Yes     Partners: Male      Current Outpatient Medications on File Prior to Visit   Medication  Sig Dispense Refill   • amLODIPine (NORVASC) 5 MG tablet TAKE 1 TABLET DAILY 90 tablet 3   • ammonium lactate (LAC-HYDRIN) 12 % lotion Apply  topically.     • aspirin 81 MG tablet Take  by mouth daily.     • azelastine (ASTELIN) 0.1 % nasal spray azelastine 137 mcg (0.1 %) nasal spray aerosol   U 1 SPR IEN BID     • chlorthalidone (HYGROTON) 25 MG tablet Take 1 tablet by mouth Every Morning. 90 tablet 3   • conjugated estrogens (Premarin) 0.625 MG/GM vaginal cream Use 0.5 grams intravaginally 2 times per week at bedtime. 30 g 4   • Diclofenac Sodium (VOLTAREN) 1 % gel gel Apply 1 gram topically to indicated area 2 times daily as needed for mild to moderate pain. 100 g 2   • meclizine (ANTIVERT) 25 MG tablet Take 1/2-1 tablet by mouth 3 times daily as needed for dizziness. 30 tablet 2   • multivitamin with minerals tablet tablet Take 1 tablet by mouth Daily.     • Omega-3 1000 MG capsule Take  by mouth.     • omeprazole (priLOSEC) 40 MG capsule TAKE 1 CAPSULE EVERY MORNING 90 capsule 3   • ondansetron ODT (Zofran ODT) 4 MG disintegrating tablet Place 1 tablet on the tongue Every 8 (Eight) Hours As Needed for Nausea or Vomiting. 15 tablet 1   • PEG 3350-KCl-NaBcb-NaCl-NaSulf (PEG-3350/Electrolytes) 236 g reconstituted solution      • pravastatin (PRAVACHOL) 80 MG tablet TAKE 1 TABLET EVERY NIGHT 90 tablet 3   • sodium-potassium-magnesium sulfates (Suprep Bowel Prep Kit) 17.5-3.13-1.6 GM/177ML solution oral solution Take 2 bottles by mouth Take As Directed. Do not eat the day before your procedure. If you didn't receive instructions call [phone]. 354 mL 0     No current facility-administered medications on file prior to visit.      Allergies   Allergen Reactions   • Codeine         Review of Systems   Constitutional: Positive for activity change. Negative for appetite change, chills, diaphoresis, fatigue, fever and unexpected weight change.   HENT: Negative for congestion, dental problem, drooling, ear  "discharge, ear pain, facial swelling, hearing loss, mouth sores, nosebleeds, postnasal drip, rhinorrhea, sinus pressure, sneezing, sore throat, tinnitus, trouble swallowing and voice change.    Eyes: Negative for photophobia, pain, discharge, redness, itching and visual disturbance.   Respiratory: Positive for cough. Negative for apnea, choking, chest tightness, shortness of breath, wheezing and stridor.    Cardiovascular: Negative for chest pain, palpitations and leg swelling.   Gastrointestinal: Negative for abdominal distention, abdominal pain, anal bleeding, blood in stool, constipation, diarrhea, nausea, rectal pain and vomiting.   Endocrine: Negative for cold intolerance, heat intolerance, polydipsia, polyphagia and polyuria.   Genitourinary: Negative for decreased urine volume, difficulty urinating, dysuria, enuresis, flank pain, frequency, genital sores, hematuria and urgency.   Musculoskeletal: Positive for arthralgias. Negative for back pain, gait problem, joint swelling, myalgias, neck pain and neck stiffness.   Skin: Negative for color change, pallor, rash and wound.   Allergic/Immunologic: Negative for environmental allergies, food allergies and immunocompromised state.   Neurological: Negative for dizziness, tremors, seizures, syncope, facial asymmetry, speech difficulty, weakness, light-headedness, numbness and headaches.   Hematological: Negative for adenopathy. Does not bruise/bleed easily.   Psychiatric/Behavioral: Negative for agitation, behavioral problems, confusion, decreased concentration, dysphoric mood, hallucinations, self-injury, sleep disturbance and suicidal ideas. The patient is not nervous/anxious and is not hyperactive.         Objective      Physical Exam  /68   Ht 152.4 cm (60\")   Wt 66.2 kg (145 lb 15.1 oz)   LMP  (LMP Unknown)   BMI 28.50 kg/m²     Body mass index is 28.5 kg/m².    General:   Mental Status:  Alert   Appearance: Cooperative, in no acute distress   Build " and Nutrition: Well-nourished well-developed female   Orientation: Alert and oriented to person, place and time   Posture: Normal   Gait: Nonantalgic    Integument:   Right hip: No skin lesions, no rash, no ecchymosis   Left hip: No skin lesions, no rash, no ecchymosis    Neurologic:   Sensation:    Right foot: Intact to light touch on the dorsal and plantar aspect    Left foot: Intact to light touch on the dorsal and plantar aspect   Motor:  Right lower extremity: 5/5 quadriceps, hamstrings, ankle dorsiflexors, and ankle plantar flexors  Left lower extremity: 5/5 quadriceps, hamstrings, ankle dorsiflexors, and ankle plantar flexors  Vascular:   Right lower extremity: 2+ dorsalis pedis pulse, prompt capillary refill   Left lower extremity: 2+ dorsalis pedis pulse, prompt capillary refill    Lower Extremities:   Right Hip:    Tenderness:  None    Swelling:  None    Crepitus: None    Atrophy:  None    Range of motion:  External Rotation: 40°       Internal Rotation: 40°       Flexion:  100°       Extension:  0°   Instability:  None  Deformities:  None  Functional testing: Negative Stinchfield  No leg length discrepancy   Left Hip:    Tenderness:  None    Swelling:  None    Crepitus:  None    Atrophy:  None    Range of motion:  External Rotation: 40°       Internal Rotation: 40°       Flexion:  100°       Extension:  0°   Instability:  None  Deformities:  None  Functional testing: Negative Stinchfield    No leg length discrepancy      Imaging/Studies      Imaging Results (Last 24 Hours)     ** No results found for the last 24 hours. **        DATE OF EXAM: 6/29/2022 1:41 PM     PROCEDURE: XR HIPS BILATERAL W OR WO PELVIS 3-4 VIEW-     INDICATIONS: hip pain; M25.551-Pain in right hip; M25.552-Pain in left  hip     COMPARISON: No comparisons available.     TECHNIQUE: AP pelvis, frog leg view of the left hip, frog-leg view of  the right hip     FINDINGS:   No acute fracture or malalignment. There is bilateral coxa  profunda.  There is mild-to-moderate osteoarthritic change of the bilateral hip  joints characterized by superior joint space narrowing and early  osteophytosis of the superolateral acetabulum. There is scattered  enthesopathic change about the iliac crests and ischial tuberosities.  The pubic symphysis and sacroiliac joints are congruent. Mild to  moderate osteoarthritic changes noted in the bilateral SI joints.  Mild-to-moderate degenerative disc disease is noted in the partially  imaged lower lumbar spine. The soft tissues appear unremarkable. There  are scattered phleboliths in the lower pelvis.      IMPRESSION:  No acute osseous findings. Mild to moderate osteoarthritic change of the  hip joints.     This report was finalized on 6/29/2022 2:11 PM by David Garrido MD.     I reviewed the above imaging, and agree with findings.    Assessment and Plan     Diagnoses and all orders for this visit:    1. Primary osteoarthritis of both hips (Primary)  -     MRI Hip Left Without Contrast; Future  -     MRI Hip Right Without Contrast; Future        1. Primary osteoarthritis of both hips        I reviewed my findings with the patient.  She does have bilateral hip arthritis, we discussed options at this point.  I do not believe that hip replacement surgery is indicated at this time.  Further imaging with MRI at this point, and I will see her back after those been completed for review.  I will see her back sooner for any problems.    Return for After Imaging Study.      Go Calderon MD  07/27/22  09:40 EDT

## 2022-08-01 ENCOUNTER — OUTSIDE FACILITY SERVICE (OUTPATIENT)
Dept: GASTROENTEROLOGY | Facility: CLINIC | Age: 77
End: 2022-08-01

## 2022-08-01 PROCEDURE — 45380 COLONOSCOPY AND BIOPSY: CPT | Performed by: INTERNAL MEDICINE

## 2022-08-01 PROCEDURE — 45385 COLONOSCOPY W/LESION REMOVAL: CPT | Performed by: INTERNAL MEDICINE

## 2022-08-02 PROBLEM — K57.30 SIGMOID DIVERTICULOSIS: Status: ACTIVE | Noted: 2022-08-02

## 2022-09-08 ENCOUNTER — HOSPITAL ENCOUNTER (OUTPATIENT)
Dept: MRI IMAGING | Facility: HOSPITAL | Age: 77
Discharge: HOME | End: 2022-09-08

## 2022-09-08 DIAGNOSIS — M16.0 PRIMARY OSTEOARTHRITIS OF BOTH HIPS: ICD-10-CM

## 2022-09-08 PROCEDURE — 73721 MRI JNT OF LWR EXTRE W/O DYE: CPT

## 2022-09-12 ENCOUNTER — HOSPITAL ENCOUNTER (OUTPATIENT)
Dept: BONE DENSITY | Facility: HOSPITAL | Age: 77
Discharge: HOME | End: 2022-09-12
Admitting: NURSE PRACTITIONER

## 2022-09-12 DIAGNOSIS — Z78.0 POSTMENOPAUSAL STATE: ICD-10-CM

## 2022-09-12 PROCEDURE — 77080 DXA BONE DENSITY AXIAL: CPT

## 2022-09-13 DIAGNOSIS — M81.8 OTHER OSTEOPOROSIS WITHOUT CURRENT PATHOLOGICAL FRACTURE: Primary | ICD-10-CM

## 2022-09-19 ENCOUNTER — TELEPHONE (OUTPATIENT)
Dept: INTERNAL MEDICINE | Facility: CLINIC | Age: 77
End: 2022-09-19

## 2022-09-19 DIAGNOSIS — R73.02 IMPAIRED GLUCOSE TOLERANCE: ICD-10-CM

## 2022-09-19 DIAGNOSIS — E78.2 MIXED HYPERLIPIDEMIA: Primary | ICD-10-CM

## 2022-09-19 DIAGNOSIS — I10 PRIMARY HYPERTENSION: ICD-10-CM

## 2022-09-19 NOTE — TELEPHONE ENCOUNTER
Caller: Marleni Armando    Relationship: Self    Best call back number: [phone]    What orders are you requesting (i.e. lab or imaging): BLOOD WORK    In what timeframe would the patient need to come in: N/A    Where will you receive your lab/imaging services: N/A    Additional notes: PATIENT STATED THAT SHE WOULD LIKE TO COME GET BLOOD WORK PRIOR TO APPOINTMENT    PLEASE ADVISE

## 2022-09-23 ENCOUNTER — TRANSCRIBE ORDERS (OUTPATIENT)
Dept: LAB | Facility: HOSPITAL | Age: 77
End: 2022-09-23

## 2022-09-23 ENCOUNTER — LAB (OUTPATIENT)
Dept: LAB | Facility: HOSPITAL | Age: 77
End: 2022-09-23

## 2022-09-23 DIAGNOSIS — M81.0 AGE-RELATED OSTEOPOROSIS WITHOUT CURRENT PATHOLOGICAL FRACTURE: ICD-10-CM

## 2022-09-23 DIAGNOSIS — M81.0 AGE-RELATED OSTEOPOROSIS WITHOUT CURRENT PATHOLOGICAL FRACTURE: Primary | ICD-10-CM

## 2022-09-23 LAB
BASOPHILS # BLD AUTO: 0.06 10*3/MM3 (ref 0–0.2)
BASOPHILS NFR BLD AUTO: 1 % (ref 0–1.5)
DEPRECATED RDW RBC AUTO: 43.2 FL (ref 37–54)
EOSINOPHIL # BLD AUTO: 0.13 10*3/MM3 (ref 0–0.4)
EOSINOPHIL NFR BLD AUTO: 2.2 % (ref 0.3–6.2)
ERYTHROCYTE [DISTWIDTH] IN BLOOD BY AUTOMATED COUNT: 14.4 % (ref 12.3–15.4)
HBA1C MFR BLD: 6 % (ref 4.8–5.6)
HCT VFR BLD AUTO: 41.8 % (ref 34–46.6)
HGB BLD-MCNC: 13.7 G/DL (ref 12–15.9)
IMM GRANULOCYTES # BLD AUTO: 0.01 10*3/MM3 (ref 0–0.05)
IMM GRANULOCYTES NFR BLD AUTO: 0.2 % (ref 0–0.5)
LYMPHOCYTES # BLD AUTO: 1.85 10*3/MM3 (ref 0.7–3.1)
LYMPHOCYTES NFR BLD AUTO: 32 % (ref 19.6–45.3)
MCH RBC QN AUTO: 26.9 PG (ref 26.6–33)
MCHC RBC AUTO-ENTMCNC: 32.8 G/DL (ref 31.5–35.7)
MCV RBC AUTO: 82.1 FL (ref 79–97)
MONOCYTES # BLD AUTO: 0.38 10*3/MM3 (ref 0.1–0.9)
MONOCYTES NFR BLD AUTO: 6.6 % (ref 5–12)
NEUTROPHILS NFR BLD AUTO: 3.35 10*3/MM3 (ref 1.7–7)
NEUTROPHILS NFR BLD AUTO: 58 % (ref 42.7–76)
NRBC BLD AUTO-RTO: 0 /100 WBC (ref 0–0.2)
PLATELET # BLD AUTO: 338 10*3/MM3 (ref 140–450)
PMV BLD AUTO: 8.6 FL (ref 6–12)
RBC # BLD AUTO: 5.09 10*6/MM3 (ref 3.77–5.28)
WBC NRBC COR # BLD: 5.78 10*3/MM3 (ref 3.4–10.8)

## 2022-09-23 PROCEDURE — 82306 VITAMIN D 25 HYDROXY: CPT

## 2022-09-23 PROCEDURE — 83937 ASSAY OF OSTEOCALCIN: CPT

## 2022-09-23 PROCEDURE — 83036 HEMOGLOBIN GLYCOSYLATED A1C: CPT | Performed by: INTERNAL MEDICINE

## 2022-09-23 PROCEDURE — 82523 COLLAGEN CROSSLINKS: CPT

## 2022-09-23 PROCEDURE — 80053 COMPREHEN METABOLIC PANEL: CPT

## 2022-09-23 PROCEDURE — 85025 COMPLETE CBC W/AUTO DIFF WBC: CPT

## 2022-09-23 PROCEDURE — 84443 ASSAY THYROID STIM HORMONE: CPT | Performed by: INTERNAL MEDICINE

## 2022-09-23 PROCEDURE — 36415 COLL VENOUS BLD VENIPUNCTURE: CPT

## 2022-09-23 PROCEDURE — 80061 LIPID PANEL: CPT | Performed by: INTERNAL MEDICINE

## 2022-09-23 PROCEDURE — 83970 ASSAY OF PARATHORMONE: CPT

## 2022-09-23 PROCEDURE — 84165 PROTEIN E-PHORESIS SERUM: CPT

## 2022-09-24 LAB
25(OH)D3 SERPL-MCNC: 53.4 NG/ML (ref 30–100)
ALBUMIN SERPL-MCNC: 4.4 G/DL (ref 3.5–5.2)
ALBUMIN/GLOB SERPL: 1.8 G/DL
ALP SERPL-CCNC: 55 U/L (ref 39–117)
ALT SERPL W P-5'-P-CCNC: 18 U/L (ref 1–33)
ANION GAP SERPL CALCULATED.3IONS-SCNC: 8.4 MMOL/L (ref 5–15)
AST SERPL-CCNC: 15 U/L (ref 1–32)
BILIRUB SERPL-MCNC: 0.5 MG/DL (ref 0–1.2)
BUN SERPL-MCNC: 14 MG/DL (ref 8–23)
BUN/CREAT SERPL: 14.6 (ref 7–25)
CALCIUM SPEC-SCNC: 10 MG/DL (ref 8.6–10.5)
CHLORIDE SERPL-SCNC: 102 MMOL/L (ref 98–107)
CHOLEST SERPL-MCNC: 202 MG/DL (ref 0–200)
CO2 SERPL-SCNC: 28.6 MMOL/L (ref 22–29)
CREAT SERPL-MCNC: 0.96 MG/DL (ref 0.57–1)
EGFRCR SERPLBLD CKD-EPI 2021: 61.8 ML/MIN/1.73
GLOBULIN UR ELPH-MCNC: 2.5 GM/DL
GLUCOSE SERPL-MCNC: 99 MG/DL (ref 65–99)
HDLC SERPL-MCNC: 56 MG/DL (ref 40–60)
LDLC SERPL CALC-MCNC: 133 MG/DL (ref 0–100)
LDLC/HDLC SERPL: 2.35 {RATIO}
POTASSIUM SERPL-SCNC: 3.6 MMOL/L (ref 3.5–5.2)
PROT SERPL-MCNC: 6.9 G/DL (ref 6–8.5)
PTH-INTACT SERPL-MCNC: 28.6 PG/ML (ref 15–65)
SODIUM SERPL-SCNC: 139 MMOL/L (ref 136–145)
TRIGL SERPL-MCNC: 73 MG/DL (ref 0–150)
TSH SERPL DL<=0.05 MIU/L-ACNC: 1.41 UIU/ML (ref 0.27–4.2)
VLDLC SERPL-MCNC: 13 MG/DL (ref 5–40)

## 2022-09-26 ENCOUNTER — OFFICE VISIT (OUTPATIENT)
Dept: INTERNAL MEDICINE | Facility: CLINIC | Age: 77
End: 2022-09-26

## 2022-09-26 VITALS
DIASTOLIC BLOOD PRESSURE: 70 MMHG | HEIGHT: 60 IN | OXYGEN SATURATION: 100 % | TEMPERATURE: 96.9 F | HEART RATE: 54 BPM | BODY MASS INDEX: 28.47 KG/M2 | SYSTOLIC BLOOD PRESSURE: 130 MMHG | WEIGHT: 145 LBS

## 2022-09-26 DIAGNOSIS — E78.2 MIXED HYPERLIPIDEMIA: Primary | ICD-10-CM

## 2022-09-26 DIAGNOSIS — M80.00XD OSTEOPOROSIS WITH CURRENT PATHOLOGICAL FRACTURE WITH ROUTINE HEALING, UNSPECIFIED OSTEOPOROSIS TYPE, SUBSEQUENT ENCOUNTER: ICD-10-CM

## 2022-09-26 DIAGNOSIS — K21.9 GASTROESOPHAGEAL REFLUX DISEASE WITHOUT ESOPHAGITIS: ICD-10-CM

## 2022-09-26 DIAGNOSIS — K57.30 SIGMOID DIVERTICULOSIS: ICD-10-CM

## 2022-09-26 DIAGNOSIS — R73.02 IMPAIRED GLUCOSE TOLERANCE: ICD-10-CM

## 2022-09-26 DIAGNOSIS — I10 PRIMARY HYPERTENSION: ICD-10-CM

## 2022-09-26 LAB
ALBUMIN SERPL ELPH-MCNC: 3.7 G/DL (ref 2.9–4.4)
ALBUMIN/GLOB SERPL: 1.2 {RATIO} (ref 0.7–1.7)
ALPHA1 GLOB SERPL ELPH-MCNC: 0.2 G/DL (ref 0–0.4)
ALPHA2 GLOB SERPL ELPH-MCNC: 0.7 G/DL (ref 0.4–1)
B-GLOBULIN SERPL ELPH-MCNC: 1 G/DL (ref 0.7–1.3)
GAMMA GLOB SERPL ELPH-MCNC: 1.2 G/DL (ref 0.4–1.8)
GLOBULIN SER CALC-MCNC: 3.2 G/DL (ref 2.2–3.9)
LABORATORY COMMENT REPORT: NORMAL
M PROTEIN SERPL ELPH-MCNC: NORMAL G/DL
OSTEOCALCIN SERPL-MCNC: 10 NG/ML
PROT PATTERN SERPL ELPH-IMP: NORMAL
PROT SERPL-MCNC: 6.9 G/DL (ref 6–8.5)

## 2022-09-26 PROCEDURE — 99214 OFFICE O/P EST MOD 30 MIN: CPT | Performed by: INTERNAL MEDICINE

## 2022-09-26 RX ORDER — ROSUVASTATIN CALCIUM 5 MG/1
5 TABLET, COATED ORAL NIGHTLY
Qty: 90 TABLET | Refills: 3 | Status: SHIPPED | OUTPATIENT
Start: 2022-09-26

## 2022-09-26 RX ORDER — ROSUVASTATIN CALCIUM 5 MG/1
5 TABLET, COATED ORAL NIGHTLY
Qty: 90 TABLET | Refills: 3 | Status: SHIPPED | OUTPATIENT
Start: 2022-09-26 | End: 2022-09-26 | Stop reason: SDUPTHER

## 2022-09-26 NOTE — PROGRESS NOTES
Patient is a 77 y.o. female who is here for a follow up of hyperlipidemia and hypertension.  Chief Complaint   Patient presents with   • Hyperlipidemia   • Hypertension         HPI:    Here for mgmt of HTN and hyperlipidemia.  Compliant with statin.  No dizziness or lightheadedness.  No HAs.  No abdominal pains.  Energy level is pretty good.  No nausea or emesis.  Sleep is not the best.      History:     Patient Active Problem List   Diagnosis   • Gastroesophageal reflux disease without esophagitis   • Impaired glucose tolerance   • Hyperlipidemia   • Hypertension   • Osteoporosis   • Asteatosis cutis   • Cystic lesion of abdominal viscera   • Well woman exam   • Sigmoid diverticulosis       Past Medical History:   Diagnosis Date   • Arthritis    • Hyperlipidemia    • Hypertension        Past Surgical History:   Procedure Laterality Date   • HYSTERECTOMY  11/1993    and REG   • TOTAL ABDOMINAL HYSTERECTOMY         Current Outpatient Medications on File Prior to Visit   Medication Sig   • amLODIPine (NORVASC) 5 MG tablet TAKE 1 TABLET DAILY   • ammonium lactate (LAC-HYDRIN) 12 % lotion Apply  topically.   • aspirin 81 MG tablet Take  by mouth daily.   • azelastine (ASTELIN) 0.1 % nasal spray azelastine 137 mcg (0.1 %) nasal spray aerosol   U 1 SPR IEN BID   • chlorthalidone (HYGROTON) 25 MG tablet Take 1 tablet by mouth Every Morning.   • conjugated estrogens (Premarin) 0.625 MG/GM vaginal cream Use 0.5 grams intravaginally 2 times per week at bedtime.   • Diclofenac Sodium (VOLTAREN) 1 % gel gel Apply 1 gram topically to indicated area 2 times daily as needed for mild to moderate pain.   • meclizine (ANTIVERT) 25 MG tablet Take 1/2-1 tablet by mouth 3 times daily as needed for dizziness.   • multivitamin with minerals tablet tablet Take 1 tablet by mouth Daily.   • Omega-3 1000 MG capsule Take  by mouth.   • omeprazole (priLOSEC) 40 MG capsule TAKE 1 CAPSULE EVERY MORNING   • ondansetron ODT (Zofran ODT) 4 MG  disintegrating tablet Place 1 tablet on the tongue Every 8 (Eight) Hours As Needed for Nausea or Vomiting.   • [DISCONTINUED] pravastatin (PRAVACHOL) 80 MG tablet TAKE 1 TABLET EVERY NIGHT   • [DISCONTINUED] PEG 3350-KCl-NaBcb-NaCl-NaSulf (PEG-3350/Electrolytes) 236 g reconstituted solution    • [DISCONTINUED] sodium-potassium-magnesium sulfates (Suprep Bowel Prep Kit) 17.5-3.13-1.6 GM/177ML solution oral solution Take 2 bottles by mouth Take As Directed. Do not eat the day before your procedure. If you didn't receive instructions call [phone].     No current facility-administered medications on file prior to visit.       Family History   Problem Relation Age of Onset   • Diabetes Other    • Kidney disease Other    • Hypertension Other        Social History     Socioeconomic History   • Marital status:    • Number of children: 1   Tobacco Use   • Smoking status: Never Smoker   • Smokeless tobacco: Never Used   Vaping Use   • Vaping Use: Never used   Substance and Sexual Activity   • Alcohol use: Not Currently   • Drug use: Never   • Sexual activity: Yes     Partners: Male         Review of Systems   Constitutional: Negative for chills, diaphoresis, fatigue and fever.   HENT: Negative for congestion, ear pain, hearing loss, nosebleeds, postnasal drip, rhinorrhea, sinus pressure and sore throat.    Eyes: Negative for pain, discharge and itching.   Respiratory: Negative for cough, chest tightness, shortness of breath and wheezing.    Cardiovascular: Negative for chest pain, palpitations and leg swelling.   Gastrointestinal: Negative for abdominal distention, abdominal pain, blood in stool, constipation, diarrhea, nausea and vomiting.        1/13 colonoscopy normal  8/22 colonoscopy with 2 polyps   Endocrine: Negative for polydipsia and polyuria.   Genitourinary: Negative for difficulty urinating, dysuria, frequency and hematuria.        3/22 mammogram and followed by GYN   Musculoskeletal: Positive for  "arthralgias. Negative for back pain, gait problem, joint swelling and myalgias.        See HPI   Skin: Negative for rash and wound.   Neurological: Negative for syncope, weakness and headaches.   Psychiatric/Behavioral: Negative for dysphoric mood and sleep disturbance. The patient is not nervous/anxious.        /70   Pulse 54   Temp 96.9 °F (36.1 °C) (Infrared)   Ht 152.4 cm (60\")   Wt 65.8 kg (145 lb)   LMP  (LMP Unknown)   SpO2 100%   BMI 28.32 kg/m²       Physical Exam  Constitutional:       Appearance: Normal appearance. She is well-developed.   HENT:      Head: Normocephalic and atraumatic.      Right Ear: External ear normal.      Left Ear: External ear normal.      Nose: Nose normal.      Mouth/Throat:      Mouth: Mucous membranes are moist.      Pharynx: Oropharynx is clear.   Eyes:      Extraocular Movements: Extraocular movements intact.      Conjunctiva/sclera: Conjunctivae normal.      Pupils: Pupils are equal, round, and reactive to light.   Cardiovascular:      Rate and Rhythm: Normal rate and regular rhythm.      Heart sounds: Normal heart sounds.   Pulmonary:      Effort: Pulmonary effort is normal.      Breath sounds: Normal breath sounds.   Abdominal:      General: Bowel sounds are normal.      Palpations: Abdomen is soft.   Musculoskeletal:         General: Normal range of motion.      Cervical back: Normal range of motion and neck supple.   Lymphadenopathy:      Cervical: No cervical adenopathy.   Skin:     General: Skin is warm and dry.   Neurological:      General: No focal deficit present.      Mental Status: She is alert and oriented to person, place, and time.   Psychiatric:         Mood and Affect: Mood normal.         Behavior: Behavior normal.         Thought Content: Thought content normal.         Procedure:      Discussion/Summary:    htn-cont on CCB and chlorthatlidone, goal of 130/80  hyperlipidemia-labs not at goal, will change to Crestor  gerd-stable on omeprazole "   glucose intolerance-labs at goal and advised to watch carbs  osteoporosis-DEXA from 7/20 dw patient, improved  xerosis-lachydrin prn, stable  DJD-tylenol prn, add diclofenac gel  Diverticulosis-increase fiber      recent labs noted and dw patient    Current Outpatient Medications:   •  amLODIPine (NORVASC) 5 MG tablet, TAKE 1 TABLET DAILY, Disp: 90 tablet, Rfl: 3  •  ammonium lactate (LAC-HYDRIN) 12 % lotion, Apply  topically., Disp: , Rfl:   •  aspirin 81 MG tablet, Take  by mouth daily., Disp: , Rfl:   •  azelastine (ASTELIN) 0.1 % nasal spray, azelastine 137 mcg (0.1 %) nasal spray aerosol  U 1 SPR IEN BID, Disp: , Rfl:   •  chlorthalidone (HYGROTON) 25 MG tablet, Take 1 tablet by mouth Every Morning., Disp: 90 tablet, Rfl: 3  •  conjugated estrogens (Premarin) 0.625 MG/GM vaginal cream, Use 0.5 grams intravaginally 2 times per week at bedtime., Disp: 30 g, Rfl: 4  •  Diclofenac Sodium (VOLTAREN) 1 % gel gel, Apply 1 gram topically to indicated area 2 times daily as needed for mild to moderate pain., Disp: 100 g, Rfl: 2  •  meclizine (ANTIVERT) 25 MG tablet, Take 1/2-1 tablet by mouth 3 times daily as needed for dizziness., Disp: 30 tablet, Rfl: 2  •  multivitamin with minerals tablet tablet, Take 1 tablet by mouth Daily., Disp: , Rfl:   •  Omega-3 1000 MG capsule, Take  by mouth., Disp: , Rfl:   •  omeprazole (priLOSEC) 40 MG capsule, TAKE 1 CAPSULE EVERY MORNING, Disp: 90 capsule, Rfl: 3  •  ondansetron ODT (Zofran ODT) 4 MG disintegrating tablet, Place 1 tablet on the tongue Every 8 (Eight) Hours As Needed for Nausea or Vomiting., Disp: 15 tablet, Rfl: 1  •  rosuvastatin (Crestor) 5 MG tablet, Take 1 tablet by mouth Every Night. Labs in 6-8 weeks, Disp: 90 tablet, Rfl: 3        Diagnoses and all orders for this visit:    1. Mixed hyperlipidemia (Primary)  -     Discontinue: rosuvastatin (Crestor) 5 MG tablet; Take 1 tablet by mouth Every Night. Labs in 6-8 weeks  Dispense: 90 tablet; Refill: 3  -      Comprehensive Metabolic Panel; Future  -     Lipid Panel; Future  -     rosuvastatin (Crestor) 5 MG tablet; Take 1 tablet by mouth Every Night. Labs in 6-8 weeks  Dispense: 90 tablet; Refill: 3    2. Primary hypertension    3. Impaired glucose tolerance    4. Gastroesophageal reflux disease without esophagitis    5. Sigmoid diverticulosis    6. Osteoporosis with current pathological fracture with routine healing, unspecified osteoporosis type, subsequent encounter

## 2022-09-27 LAB — COLLAGEN NTX SER-SCNC: 13.7 NMOL BCE/L (ref 6.2–19)

## 2022-10-28 ENCOUNTER — TELEPHONE (OUTPATIENT)
Dept: ORTHOPEDIC SURGERY | Facility: CLINIC | Age: 77
End: 2022-10-28

## 2022-10-28 NOTE — TELEPHONE ENCOUNTER
I called to discuss her MRI results but she was not home, and I spoke to her .  I will try to call back on Monday to review.    Addendum:    I called the patient back today, and went over her MRI results.  Please see the results below.  I do not believe that surgical intervention would be particularly helpful.  She [facility]a [facility], and I would recommend continuing with that evaluation and follow-up.  She was asking me about a stronger medication than Tylenol that may help her, and I think that is the best route for her to consider and discuss with Dr. Licona.  I will be happy to see her back in the future if she has worsening pain or symptoms.    DATE OF EXAM: 9/8/2022 3:02 PM     PROCEDURE: MRI HIP RIGHT WO CONTRAST-     INDICATIONS: Hip pain, chronic, articular cartilage eval, x-ray done;  M16.0-Bilateral primary osteoarthritis of hip     COMPARISON: No comparisons available.     TECHNIQUE: Multiplanar sequence MR images of the right hip were obtained  without contrast.     FINDINGS:   Bones: No evidence of fracture.     Labrum: Degenerative tearing of the anterosuperior and superolateral  labrum.     Joints: Mild chondral thinning of the superior hip joint with no  subchondral edema.     Tendons: Hamstring tendons are intact. Adductor tendons are intact.  Rectus femoris is intact. Iliopsoas is intact. Gluteus minimus and  medius are intact. Narrowing of the ischiofemoral interval with  associated mild edema.     Pelvis: Mild degenerative changes of the sacroiliac joints. Mild  degenerative endplate edema at L3-4 and L4-5. Diverticulosis.     IMPRESSION:  Mild degenerative changes of the right hip.     Narrowing of the ischiofemoral interval with associated mild edema.     This report was finalized on 9/9/2022 10:16 AM by Emerson Nunez.     DATE OF EXAM: 9/8/2022 3:01 PM     PROCEDURE: MRI HIP LEFT WO CONTRAST-     INDICATIONS: Hip pain, chronic, articular cartilage eval, x-ray  done;  M16.0-Bilateral primary osteoarthritis of hip     COMPARISON: No comparisons available.     TECHNIQUE: Multiplanar/multisequence images of the left hip were  performed according to routine MRI protocol.     FINDINGS:   Bones: No evidence of fracture.     Labrum: Degenerative tearing of the anterosuperior and superolateral  labrum.     Joints: Mild to moderate chondral thinning of the superior hip joint  with small amount of subchondral edema in the superior acetabulum.     Tendons: Hamstring tendons are intact. Adductor tendons are intact.  Rectus femoris is intact. Iliopsoas is intact. Low-grade partial tear of  gluteus minimus. Gluteus medius is intact. Narrowing of the  ischiofemoral interval with associated mild edema.     Pelvis: Mild degenerative changes of the sacroiliac joints. Mild  degenerative endplate edema at L3-4 and L4-5. Diverticulosis.     IMPRESSION:  Mild to moderate degenerative changes of the left hip.     Narrowing of the ischiofemoral interval with associated mild edema.     This report was finalized on 9/9/2022 10:14 AM by Emerson Nunez.         Go Calderon MD  10/31/22  17:37 EDT

## 2022-11-02 RX ORDER — AMLODIPINE BESYLATE 5 MG/1
TABLET ORAL
Qty: 90 TABLET | Refills: 3 | Status: SHIPPED | OUTPATIENT
Start: 2022-11-02

## 2023-02-09 ENCOUNTER — TRANSCRIBE ORDERS (OUTPATIENT)
Dept: LAB | Facility: HOSPITAL | Age: 78
End: 2023-02-09
Payer: MEDICARE

## 2023-02-09 ENCOUNTER — LAB (OUTPATIENT)
Dept: LAB | Facility: HOSPITAL | Age: 78
End: 2023-02-09
Payer: MEDICARE

## 2023-02-09 DIAGNOSIS — M25.551 PAIN IN RIGHT HIP: Primary | ICD-10-CM

## 2023-02-09 DIAGNOSIS — M81.0 AGE-RELATED OSTEOPOROSIS WITHOUT CURRENT PATHOLOGICAL FRACTURE: ICD-10-CM

## 2023-02-09 DIAGNOSIS — M25.551 PAIN IN RIGHT HIP: ICD-10-CM

## 2023-02-09 DIAGNOSIS — E78.2 MIXED HYPERLIPIDEMIA: ICD-10-CM

## 2023-02-09 LAB
ALBUMIN SERPL-MCNC: 4.5 G/DL (ref 3.5–5.2)
ALBUMIN/GLOB SERPL: 1.6 G/DL
ALP SERPL-CCNC: 46 U/L (ref 39–117)
ALT SERPL W P-5'-P-CCNC: 21 U/L (ref 1–33)
ANION GAP SERPL CALCULATED.3IONS-SCNC: 11.6 MMOL/L (ref 5–15)
AST SERPL-CCNC: 20 U/L (ref 1–32)
BASOPHILS # BLD AUTO: 0.05 10*3/MM3 (ref 0–0.2)
BASOPHILS NFR BLD AUTO: 0.8 % (ref 0–1.5)
BILIRUB SERPL-MCNC: 0.4 MG/DL (ref 0–1.2)
BUN SERPL-MCNC: 16 MG/DL (ref 8–23)
BUN/CREAT SERPL: 15.1 (ref 7–25)
CALCIUM SPEC-SCNC: 10.3 MG/DL (ref 8.6–10.5)
CHLORIDE SERPL-SCNC: 102 MMOL/L (ref 98–107)
CHOLEST SERPL-MCNC: 180 MG/DL (ref 0–200)
CO2 SERPL-SCNC: 26.4 MMOL/L (ref 22–29)
CREAT SERPL-MCNC: 1.06 MG/DL (ref 0.57–1)
DEPRECATED RDW RBC AUTO: 45.7 FL (ref 37–54)
EGFRCR SERPLBLD CKD-EPI 2021: 54.9 ML/MIN/1.73
EOSINOPHIL # BLD AUTO: 0.2 10*3/MM3 (ref 0–0.4)
EOSINOPHIL NFR BLD AUTO: 3.3 % (ref 0.3–6.2)
ERYTHROCYTE [DISTWIDTH] IN BLOOD BY AUTOMATED COUNT: 15.3 % (ref 12.3–15.4)
GLOBULIN UR ELPH-MCNC: 2.8 GM/DL
GLUCOSE SERPL-MCNC: 98 MG/DL (ref 65–99)
HCT VFR BLD AUTO: 40.9 % (ref 34–46.6)
HDLC SERPL-MCNC: 62 MG/DL (ref 40–60)
HGB BLD-MCNC: 13.6 G/DL (ref 12–15.9)
IMM GRANULOCYTES # BLD AUTO: 0.01 10*3/MM3 (ref 0–0.05)
IMM GRANULOCYTES NFR BLD AUTO: 0.2 % (ref 0–0.5)
LDLC SERPL CALC-MCNC: 105 MG/DL (ref 0–100)
LDLC/HDLC SERPL: 1.68 {RATIO}
LYMPHOCYTES # BLD AUTO: 1.95 10*3/MM3 (ref 0.7–3.1)
LYMPHOCYTES NFR BLD AUTO: 32.6 % (ref 19.6–45.3)
MCH RBC QN AUTO: 27.4 PG (ref 26.6–33)
MCHC RBC AUTO-ENTMCNC: 33.3 G/DL (ref 31.5–35.7)
MCV RBC AUTO: 82.3 FL (ref 79–97)
MONOCYTES # BLD AUTO: 0.43 10*3/MM3 (ref 0.1–0.9)
MONOCYTES NFR BLD AUTO: 7.2 % (ref 5–12)
NEUTROPHILS NFR BLD AUTO: 3.34 10*3/MM3 (ref 1.7–7)
NEUTROPHILS NFR BLD AUTO: 55.9 % (ref 42.7–76)
NRBC BLD AUTO-RTO: 0.2 /100 WBC (ref 0–0.2)
PLATELET # BLD AUTO: 355 10*3/MM3 (ref 140–450)
PMV BLD AUTO: 8.9 FL (ref 6–12)
POTASSIUM SERPL-SCNC: 3.6 MMOL/L (ref 3.5–5.2)
PROT SERPL-MCNC: 7.3 G/DL (ref 6–8.5)
RBC # BLD AUTO: 4.97 10*6/MM3 (ref 3.77–5.28)
SODIUM SERPL-SCNC: 140 MMOL/L (ref 136–145)
TRIGL SERPL-MCNC: 70 MG/DL (ref 0–150)
VLDLC SERPL-MCNC: 13 MG/DL (ref 5–40)
WBC NRBC COR # BLD: 5.98 10*3/MM3 (ref 3.4–10.8)

## 2023-02-09 PROCEDURE — 85025 COMPLETE CBC W/AUTO DIFF WBC: CPT

## 2023-02-09 PROCEDURE — 36415 COLL VENOUS BLD VENIPUNCTURE: CPT

## 2023-02-09 PROCEDURE — 80061 LIPID PANEL: CPT

## 2023-02-09 PROCEDURE — 80053 COMPREHEN METABOLIC PANEL: CPT

## 2023-02-14 ENCOUNTER — OFFICE VISIT (OUTPATIENT)
Dept: INTERNAL MEDICINE | Facility: CLINIC | Age: 78
End: 2023-02-14
Payer: MEDICARE

## 2023-02-14 VITALS
HEART RATE: 64 BPM | BODY MASS INDEX: 28.62 KG/M2 | SYSTOLIC BLOOD PRESSURE: 140 MMHG | HEIGHT: 60 IN | OXYGEN SATURATION: 99 % | DIASTOLIC BLOOD PRESSURE: 70 MMHG | WEIGHT: 145.8 LBS

## 2023-02-14 DIAGNOSIS — K21.9 GASTROESOPHAGEAL REFLUX DISEASE WITHOUT ESOPHAGITIS: ICD-10-CM

## 2023-02-14 DIAGNOSIS — I10 PRIMARY HYPERTENSION: Primary | ICD-10-CM

## 2023-02-14 DIAGNOSIS — M80.00XD OSTEOPOROSIS WITH CURRENT PATHOLOGICAL FRACTURE WITH ROUTINE HEALING, UNSPECIFIED OSTEOPOROSIS TYPE, SUBSEQUENT ENCOUNTER: ICD-10-CM

## 2023-02-14 DIAGNOSIS — R73.02 IMPAIRED GLUCOSE TOLERANCE: ICD-10-CM

## 2023-02-14 DIAGNOSIS — M79.609 PARESTHESIA AND PAIN OF LEFT EXTREMITY: ICD-10-CM

## 2023-02-14 DIAGNOSIS — R20.2 PARESTHESIA AND PAIN OF LEFT EXTREMITY: ICD-10-CM

## 2023-02-14 DIAGNOSIS — E78.2 MIXED HYPERLIPIDEMIA: ICD-10-CM

## 2023-02-14 LAB
BILIRUB BLD-MCNC: NEGATIVE MG/DL
CLARITY, POC: CLEAR
COLOR UR: YELLOW
EXPIRATION DATE: NORMAL
GLUCOSE UR STRIP-MCNC: NEGATIVE MG/DL
KETONES UR QL: NEGATIVE
LEUKOCYTE EST, POC: NEGATIVE
Lab: NORMAL
NITRITE UR-MCNC: NEGATIVE MG/ML
PH UR: 7 [PH] (ref 5–8)
PROT UR STRIP-MCNC: NEGATIVE MG/DL
RBC # UR STRIP: NEGATIVE /UL
SP GR UR: 1.01 (ref 1–1.03)
UROBILINOGEN UR QL: NORMAL

## 2023-02-14 PROCEDURE — 1170F FXNL STATUS ASSESSED: CPT | Performed by: INTERNAL MEDICINE

## 2023-02-14 PROCEDURE — 82570 ASSAY OF URINE CREATININE: CPT | Performed by: INTERNAL MEDICINE

## 2023-02-14 PROCEDURE — 1159F MED LIST DOCD IN RCRD: CPT | Performed by: INTERNAL MEDICINE

## 2023-02-14 PROCEDURE — 82043 UR ALBUMIN QUANTITATIVE: CPT | Performed by: INTERNAL MEDICINE

## 2023-02-14 PROCEDURE — 81003 URINALYSIS AUTO W/O SCOPE: CPT | Performed by: INTERNAL MEDICINE

## 2023-02-14 PROCEDURE — G0439 PPPS, SUBSEQ VISIT: HCPCS | Performed by: INTERNAL MEDICINE

## 2023-02-14 PROCEDURE — 1125F AMNT PAIN NOTED PAIN PRSNT: CPT | Performed by: INTERNAL MEDICINE

## 2023-02-14 NOTE — PROGRESS NOTES
The ABCs of the Annual Wellness Visit  Subsequent Medicare Wellness Visit    Chief Complaint   Patient presents with   • Annual Exam      Subjective    History of Present Illness:  Marleni Armando is a 77 y.o. female who presents for a Subsequent Medicare Wellness Visit.    The following portions of the patient's history were reviewed and   updated as appropriate: current medications, past family history, past medical history, past social history, past surgical history and problem list.     Compared to one year ago, the patient feels her physical   health is worse.    Compared to one year ago, the patient feels her mental   health is worse.    Recent Hospitalizations:  She was not admitted to the hospital during the last year.       Current Medical Providers:  Patient Care Team:  Edison Herring MD as PCP - General    Outpatient Medications Prior to Visit   Medication Sig Dispense Refill   • amLODIPine (NORVASC) 5 MG tablet TAKE 1 TABLET DAILY 90 tablet 3   • ammonium lactate (LAC-HYDRIN) 12 % lotion Apply  topically.     • aspirin 81 MG tablet Take  by mouth daily.     • azelastine (ASTELIN) 0.1 % nasal spray azelastine 137 mcg (0.1 %) nasal spray aerosol   U 1 SPR IEN BID     • chlorthalidone (HYGROTON) 25 MG tablet Take 1 tablet by mouth Every Morning. 90 tablet 3   • conjugated estrogens (Premarin) 0.625 MG/GM vaginal cream Use 0.5 grams intravaginally 2 times per week at bedtime. 30 g 4   • Diclofenac Sodium (VOLTAREN) 1 % gel gel Apply 1 gram topically to indicated area 2 times daily as needed for mild to moderate pain. 100 g 2   • meclizine (ANTIVERT) 25 MG tablet Take 1/2-1 tablet by mouth 3 times daily as needed for dizziness. 30 tablet 2   • multivitamin with minerals tablet tablet Take 1 tablet by mouth Daily.     • Omega-3 1000 MG capsule Take  by mouth.     • omeprazole (priLOSEC) 40 MG capsule TAKE 1 CAPSULE EVERY MORNING 90 capsule 3   • ondansetron ODT (Zofran ODT) 4 MG disintegrating tablet  Place 1 tablet on the tongue Every 8 (Eight) Hours As Needed for Nausea or Vomiting. 15 tablet 1   • rosuvastatin (Crestor) 5 MG tablet Take 1 tablet by mouth Every Night. Labs in 6-8 weeks 90 tablet 3     No facility-administered medications prior to visit.       No opioid medication identified on active medication list. I have reviewed chart for other potential  high risk medication/s and harmful drug interactions in the elderly.          Aspirin is on active medication list. Aspirin use is indicated based on review of current medical condition/s. Pros and cons of this therapy have been discussed today. Benefits of this medication outweigh potential harm.  Patient has been encouraged to continue taking this medication.  .      Fall Risk Assessment was completed, and patient is at low risk for falls.      Patient Active Problem List   Diagnosis   • Gastroesophageal reflux disease without esophagitis   • Impaired glucose tolerance   • Hyperlipidemia   • Hypertension   • Osteoporosis   • Asteatosis cutis   • Cystic lesion of abdominal viscera   • Well woman exam   • Sigmoid diverticulosis     Advance Care Planning   Advance Directive is not on file.  ACP discussion was held with the patient during this visit. Patient does not have an advance directive, information provided.    Review of Systems   Constitutional: Negative for chills, diaphoresis, fatigue and fever.   HENT: Negative for congestion, ear pain, hearing loss, nosebleeds, postnasal drip, rhinorrhea, sinus pressure and sore throat.    Eyes: Negative for pain, discharge and itching.   Respiratory: Negative for cough, chest tightness, shortness of breath and wheezing.    Cardiovascular: Negative for chest pain, palpitations and leg swelling.   Gastrointestinal: Negative for abdominal distention, abdominal pain, blood in stool, constipation, diarrhea, nausea and vomiting.        1/13 colonoscopy normal  8/22 colonoscopy with 2 polyps   Endocrine: Negative for  "polydipsia and polyuria.   Genitourinary: Negative for difficulty urinating, dysuria, frequency and hematuria.        3/22 mammogram and followed by GYN   Musculoskeletal: Positive for arthralgias. Negative for back pain, gait problem, joint swelling and myalgias.        See HPI   Skin: Negative for rash and wound.   Neurological: Positive for numbness (LUE). Negative for syncope, weakness and headaches.   Psychiatric/Behavioral: Negative for dysphoric mood and sleep disturbance. The patient is not nervous/anxious.          Objective       Vitals:    02/14/23 0902 02/14/23 0914   BP: 128/74 140/70   BP Location: Left arm    Patient Position: Sitting    Pulse: 64    SpO2: 99%    Weight: 66.1 kg (145 lb 12.8 oz)    Height: 152.4 cm (60\")    PainSc:   6      BMI Readings from Last 1 Encounters:   02/14/23 28.47 kg/m²   BMI is above normal parameters. Recommendations include: exercise counseling and nutrition counseling    Does the patient have evidence of cognitive impairment? No    Physical Exam  Constitutional:       Appearance: Normal appearance. She is well-developed.   HENT:      Head: Normocephalic and atraumatic.      Right Ear: External ear normal.      Left Ear: External ear normal.      Nose: Nose normal.      Mouth/Throat:      Mouth: Mucous membranes are moist.      Pharynx: Oropharynx is clear.   Eyes:      Extraocular Movements: Extraocular movements intact.      Conjunctiva/sclera: Conjunctivae normal.      Pupils: Pupils are equal, round, and reactive to light.   Cardiovascular:      Rate and Rhythm: Normal rate and regular rhythm.      Heart sounds: Normal heart sounds.   Pulmonary:      Effort: Pulmonary effort is normal.      Breath sounds: Normal breath sounds.   Abdominal:      General: Bowel sounds are normal.      Palpations: Abdomen is soft.   Musculoskeletal:         General: Normal range of motion.      Cervical back: Normal range of motion and neck supple.   Lymphadenopathy:      Cervical: No " cervical adenopathy.   Skin:     General: Skin is warm and dry.   Neurological:      General: No focal deficit present.      Mental Status: She is alert and oriented to person, place, and time.   Psychiatric:         Mood and Affect: Mood normal.         Behavior: Behavior normal.         Thought Content: Thought content normal.       Lab Results   Component Value Date    TRIG 70 02/09/2023    HDL 62 (H) 02/09/2023     (H) 02/09/2023    VLDL 13 02/09/2023            HEALTH RISK ASSESSMENT    Smoking Status:  Social History     Tobacco Use   Smoking Status Never   Smokeless Tobacco Never     Alcohol Consumption:  Social History     Substance and Sexual Activity   Alcohol Use Not Currently     Fall Risk Screen:    STEADI Fall Risk Assessment was completed, and patient is at LOW risk for falls.Assessment completed on:2/14/2023    Depression Screening:  PHQ-2/PHQ-9 Depression Screening 2/14/2023   Retired PHQ-9 Total Score -   Retired Total Score -   Little Interest or Pleasure in Doing Things 1-->several days   Feeling Down, Depressed or Hopeless 1-->several days   Trouble Falling or Staying Asleep, or Sleeping Too Much 1-->several days   Feeling Tired or Having Little Energy 1-->several days   Poor Appetite or Overeating 0-->not at all   Feeling Bad about Yourself - or that You are a Failure or Have Let Yourself or Your Family Down 0-->not at all   Trouble Concentrating on Things, Such as Reading the Newspaper or Watching Television 1-->several days   Moving or Speaking So Slowly that Other People Could Have Noticed? Or the Opposite - Being So Fidgety 0-->not at all   Thoughts that You Would be Better Off Dead or of Hurting Yourself in Some Way 0-->not at all   PHQ-9: Brief Depression Severity Measure Score 5   If You Checked Off Any Problems, How Difficult Have These Problems Made It For You to Do Your Work, Take Care of Things at Home, or Get Along with Other People? somewhat difficult       Health Habits and  Functional and Cognitive Screening:  Functional & Cognitive Status 2/14/2023   Do you have difficulty preparing food and eating? No   Do you have difficulty bathing yourself, getting dressed or grooming yourself? No   Do you have difficulty using the toilet? No   Do you have difficulty moving around from place to place? Yes   Do you have trouble with steps or getting out of a bed or a chair? No   Current Diet Well Balanced Diet   Dental Exam Up to date   Eye Exam Up to date   Exercise (times per week) 3 times per week   Current Exercises Include Other   Do you need help using the phone?  No   Are you deaf or do you have serious difficulty hearing?  No   Do you need help with transportation? No   Do you need help shopping? No   Do you need help preparing meals?  No   Do you need help with housework?  No   Do you need help with laundry? No   Do you need help taking your medications? No   Do you need help managing money? No   Do you ever drive or ride in a car without wearing a seat belt? No   Have you felt unusual stress, anger or loneliness in the last month? Yes   Who do you live with? Spouse   If you need help, do you have trouble finding someone available to you? No   Have you been bothered in the last four weeks by sexual problems? No   Do you have difficulty concentrating, remembering or making decisions? No       Age-appropriate Screening Schedule:  Refer to the list below for future screening recommendations based on patient's age, sex and/or medical conditions. Orders for these recommended tests are listed in the plan section. The patient has been provided with a written plan.    Health Maintenance   Topic Date Due   • TDAP/TD VACCINES (2 - Tdap) 10/21/2008   • LIPID PANEL  02/09/2024   • MAMMOGRAM  03/01/2024   • DXA SCAN  09/12/2024   • INFLUENZA VACCINE  Completed   • ZOSTER VACCINE  Discontinued              Assessment & Plan     CMS Preventative Services Quick Reference  Risk Factors Identified During  Encounter  Immunizations Discussed/Encouraged: Td  Inactivity/Sedentary: Patient was advised to exercise at least 150 minutes a week per CDC recommendations.  Polypharmacy: Medication List reviewed and Medications are appropriate for patient  The above risks/problems have been discussed with the patient.  Follow up actions/plans if indicated are seen below in the Assessment/Plan Section.  Pertinent information has been shared with the patient in the After Visit Summary.    Diagnoses and all orders for this visit:    1. Primary hypertension (Primary)  -     Microalbumin / Creatinine Urine Ratio - Urine, Clean Catch  -     POC Urinalysis Dipstick, Automated    2. Paresthesia and pain of left extremity  -     EMG & Nerve Conduction Test    3. Mixed hyperlipidemia    4. Impaired glucose tolerance    5. Gastroesophageal reflux disease without esophagitis    6. Osteoporosis with current pathological fracture with routine healing, unspecified osteoporosis type, subsequent encounter        Follow Up:   Return in about 4 months (around 6/14/2023) for Recheck, with fasting labs.     An After Visit Summary and PPPS were given to the patient.             htn-cont on CCB and chlorthatlidone, goal of 130/80  hyperlipidemia-labs improved with change to Crestor  gerd-stable on omeprazole   glucose intolerance-labs at goal and advised to watch carbs  osteoporosis-DEXA from 7/20 dw patient, on Boniva  xerosis-lachydrin prn, stable  DJD-tylenol prn, add diclofenac gel  Diverticulosis-increase fiber      recent labs noted and dw patient

## 2023-02-15 LAB
ALBUMIN UR-MCNC: <1.2 MG/DL
CREAT UR-MCNC: 60.1 MG/DL
MICROALBUMIN/CREAT UR: NORMAL MG/G{CREAT}

## 2023-02-21 ENCOUNTER — TELEPHONE (OUTPATIENT)
Dept: INTERNAL MEDICINE | Facility: CLINIC | Age: 78
End: 2023-02-21

## 2023-02-21 DIAGNOSIS — I10 PRIMARY HYPERTENSION: ICD-10-CM

## 2023-02-21 RX ORDER — CHLORTHALIDONE 25 MG/1
TABLET ORAL
Qty: 90 TABLET | Refills: 3 | Status: SHIPPED | OUTPATIENT
Start: 2023-02-21

## 2023-02-21 NOTE — TELEPHONE ENCOUNTER
Provider: DR. BECK     Caller: CASANDRA REILLY     Phone Number: [phone]    Reason for Call: PATIENT STATES AT HER LAST APPOINTMENT HER AND DR. BECK DICUSSED DOING A NERVE CONDUCTING TEST AND HE SAID SHE WOULD BE GETTING A CALL BACK ABOUT THIS TO SET IT UP BUT SHE HAS NOT HEARD ANYTHING BACK SO SHE WOULD LIKE A CALL BACK TO DISCUSS THIS.

## 2023-03-01 ENCOUNTER — TELEPHONE (OUTPATIENT)
Dept: INTERNAL MEDICINE | Facility: CLINIC | Age: 78
End: 2023-03-01
Payer: MEDICARE

## 2023-03-01 DIAGNOSIS — M79.609 PARESTHESIA AND PAIN OF LEFT EXTREMITY: Primary | ICD-10-CM

## 2023-03-01 DIAGNOSIS — R20.2 PARESTHESIA AND PAIN OF LEFT EXTREMITY: Primary | ICD-10-CM

## 2023-03-01 NOTE — TELEPHONE ENCOUNTER
rashmi Jacobs dr [facility] scheduling wasn't able to schedule to the nerve conduction test b/c the referral had been attached to a past visit. I put a new referral in for the pt and was hoping you'd sign it, so I can get her scheduled. Thank you

## 2023-03-08 ENCOUNTER — HOSPITAL ENCOUNTER (OUTPATIENT)
Dept: NEUROLOGY | Facility: HOSPITAL | Age: 78
Discharge: HOME | End: 2023-03-08
Admitting: INTERNAL MEDICINE
Payer: MEDICARE

## 2023-03-08 DIAGNOSIS — M79.632 LEFT FOREARM PAIN: Primary | ICD-10-CM

## 2023-03-08 PROCEDURE — 95908 NRV CNDJ TST 3-4 STUDIES: CPT

## 2023-03-08 PROCEDURE — 95886 MUSC TEST DONE W/N TEST COMP: CPT

## 2023-04-18 ENCOUNTER — TELEPHONE (OUTPATIENT)
Dept: INTERNAL MEDICINE | Facility: CLINIC | Age: 78
End: 2023-04-18
Payer: MEDICARE

## 2023-04-18 NOTE — TELEPHONE ENCOUNTER
PT came to the office to request that  write a prescription for physical therapy for her hips and thighs. She is requesting that this be sent to [facility].    Their office number is:[phone]    Their fax number is: [phone]

## 2023-04-19 DIAGNOSIS — M79.651 PAIN IN BOTH THIGHS: ICD-10-CM

## 2023-04-19 DIAGNOSIS — M25.552 BILATERAL HIP PAIN: Primary | ICD-10-CM

## 2023-04-19 DIAGNOSIS — M79.652 PAIN IN BOTH THIGHS: ICD-10-CM

## 2023-04-19 DIAGNOSIS — M25.551 BILATERAL HIP PAIN: Primary | ICD-10-CM

## 2023-04-24 DIAGNOSIS — K21.9 GASTROESOPHAGEAL REFLUX DISEASE WITHOUT ESOPHAGITIS: ICD-10-CM

## 2023-04-24 RX ORDER — OMEPRAZOLE 40 MG/1
CAPSULE, DELAYED RELEASE ORAL
Qty: 90 CAPSULE | Refills: 3 | Status: SHIPPED | OUTPATIENT
Start: 2023-04-24

## 2023-06-14 ENCOUNTER — OFFICE VISIT (OUTPATIENT)
Dept: INTERNAL MEDICINE | Facility: CLINIC | Age: 78
End: 2023-06-14
Payer: MEDICARE

## 2023-06-14 ENCOUNTER — LAB (OUTPATIENT)
Dept: LAB | Facility: HOSPITAL | Age: 78
End: 2023-06-14
Payer: MEDICARE

## 2023-06-14 VITALS
OXYGEN SATURATION: 97 % | HEIGHT: 60 IN | SYSTOLIC BLOOD PRESSURE: 124 MMHG | DIASTOLIC BLOOD PRESSURE: 64 MMHG | WEIGHT: 150 LBS | HEART RATE: 69 BPM | BODY MASS INDEX: 29.45 KG/M2

## 2023-06-14 DIAGNOSIS — R73.02 IMPAIRED GLUCOSE TOLERANCE: ICD-10-CM

## 2023-06-14 DIAGNOSIS — K57.30 SIGMOID DIVERTICULOSIS: ICD-10-CM

## 2023-06-14 DIAGNOSIS — K21.9 GASTROESOPHAGEAL REFLUX DISEASE WITHOUT ESOPHAGITIS: ICD-10-CM

## 2023-06-14 DIAGNOSIS — E78.2 MIXED HYPERLIPIDEMIA: ICD-10-CM

## 2023-06-14 DIAGNOSIS — R01.1 HEART MURMUR: ICD-10-CM

## 2023-06-14 DIAGNOSIS — I10 PRIMARY HYPERTENSION: Primary | ICD-10-CM

## 2023-06-14 LAB
ALBUMIN SERPL-MCNC: 4.6 G/DL (ref 3.5–5.2)
ALBUMIN/GLOB SERPL: 1.6 G/DL
ALP SERPL-CCNC: 49 U/L (ref 39–117)
ALT SERPL W P-5'-P-CCNC: 20 U/L (ref 1–33)
ANION GAP SERPL CALCULATED.3IONS-SCNC: 13.5 MMOL/L (ref 5–15)
AST SERPL-CCNC: 21 U/L (ref 1–32)
BILIRUB SERPL-MCNC: 0.2 MG/DL (ref 0–1.2)
BUN SERPL-MCNC: 20 MG/DL (ref 8–23)
BUN/CREAT SERPL: 17.5 (ref 7–25)
CALCIUM SPEC-SCNC: 10.2 MG/DL (ref 8.6–10.5)
CHLORIDE SERPL-SCNC: 103 MMOL/L (ref 98–107)
CHOLEST SERPL-MCNC: 191 MG/DL (ref 0–200)
CO2 SERPL-SCNC: 24.5 MMOL/L (ref 22–29)
CREAT SERPL-MCNC: 1.14 MG/DL (ref 0.57–1)
DEPRECATED RDW RBC AUTO: 41.8 FL (ref 37–54)
EGFRCR SERPLBLD CKD-EPI 2021: 50.3 ML/MIN/1.73
ERYTHROCYTE [DISTWIDTH] IN BLOOD BY AUTOMATED COUNT: 14.4 % (ref 12.3–15.4)
GLOBULIN UR ELPH-MCNC: 2.9 GM/DL
GLUCOSE SERPL-MCNC: 149 MG/DL (ref 65–99)
HBA1C MFR BLD: 6.3 % (ref 4.8–5.6)
HCT VFR BLD AUTO: 41.3 % (ref 34–46.6)
HDLC SERPL-MCNC: 56 MG/DL (ref 40–60)
HGB BLD-MCNC: 13.9 G/DL (ref 12–15.9)
LDLC SERPL CALC-MCNC: 114 MG/DL (ref 0–100)
LDLC/HDLC SERPL: 1.99 {RATIO}
MCH RBC QN AUTO: 27.1 PG (ref 26.6–33)
MCHC RBC AUTO-ENTMCNC: 33.7 G/DL (ref 31.5–35.7)
MCV RBC AUTO: 80.5 FL (ref 79–97)
PLATELET # BLD AUTO: 375 10*3/MM3 (ref 140–450)
PMV BLD AUTO: 8.7 FL (ref 6–12)
POTASSIUM SERPL-SCNC: 3.5 MMOL/L (ref 3.5–5.2)
PROT SERPL-MCNC: 7.5 G/DL (ref 6–8.5)
RBC # BLD AUTO: 5.13 10*6/MM3 (ref 3.77–5.28)
SODIUM SERPL-SCNC: 141 MMOL/L (ref 136–145)
TRIGL SERPL-MCNC: 117 MG/DL (ref 0–150)
TSH SERPL DL<=0.05 MIU/L-ACNC: 1.43 UIU/ML (ref 0.27–4.2)
VLDLC SERPL-MCNC: 21 MG/DL (ref 5–40)
WBC NRBC COR # BLD: 6.01 10*3/MM3 (ref 3.4–10.8)

## 2023-06-14 PROCEDURE — 1160F RVW MEDS BY RX/DR IN RCRD: CPT | Performed by: INTERNAL MEDICINE

## 2023-06-14 PROCEDURE — 3078F DIAST BP <80 MM HG: CPT | Performed by: INTERNAL MEDICINE

## 2023-06-14 PROCEDURE — 84443 ASSAY THYROID STIM HORMONE: CPT | Performed by: INTERNAL MEDICINE

## 2023-06-14 PROCEDURE — 99214 OFFICE O/P EST MOD 30 MIN: CPT | Performed by: INTERNAL MEDICINE

## 2023-06-14 PROCEDURE — 83036 HEMOGLOBIN GLYCOSYLATED A1C: CPT | Performed by: INTERNAL MEDICINE

## 2023-06-14 PROCEDURE — 1159F MED LIST DOCD IN RCRD: CPT | Performed by: INTERNAL MEDICINE

## 2023-06-14 PROCEDURE — 80053 COMPREHEN METABOLIC PANEL: CPT | Performed by: INTERNAL MEDICINE

## 2023-06-14 PROCEDURE — 85027 COMPLETE CBC AUTOMATED: CPT | Performed by: INTERNAL MEDICINE

## 2023-06-14 PROCEDURE — 80061 LIPID PANEL: CPT | Performed by: INTERNAL MEDICINE

## 2023-06-14 PROCEDURE — 3074F SYST BP LT 130 MM HG: CPT | Performed by: INTERNAL MEDICINE

## 2023-06-14 NOTE — PROGRESS NOTES
Patient is a 77 y.o. female who is here for a follow up of hyperlipidemia and hypertension.  Chief Complaint   Patient presents with   • Hyperlipidemia   • Hypertension         HPI:    Here for mgmt of HTN and hyperlipidemia.  BP is good.  No dizziness or lightheadedness.  No HAs.  Having issues with bilateral hip pain.  No falls.  No LE weakness.  Using tylenol for the pain.     History:     Patient Active Problem List   Diagnosis   • Gastroesophageal reflux disease without esophagitis   • Impaired glucose tolerance   • Hyperlipidemia   • Hypertension   • Osteoporosis   • Asteatosis cutis   • Cystic lesion of abdominal viscera   • Well woman exam   • Sigmoid diverticulosis   • Heart murmur       Past Medical History:   Diagnosis Date   • Arthritis    • Hyperlipidemia    • Hypertension        Past Surgical History:   Procedure Laterality Date   • HYSTERECTOMY  11/1993    and REG   • TOTAL ABDOMINAL HYSTERECTOMY         Current Outpatient Medications on File Prior to Visit   Medication Sig   • amLODIPine (NORVASC) 5 MG tablet TAKE 1 TABLET DAILY   • ammonium lactate (LAC-HYDRIN) 12 % lotion Apply  topically.   • aspirin 81 MG tablet Take  by mouth daily.   • azelastine (ASTELIN) 0.1 % nasal spray azelastine 137 mcg (0.1 %) nasal spray aerosol   U 1 SPR IEN BID   • chlorthalidone (HYGROTON) 25 MG tablet TAKE 1 TABLET EVERY MORNING   • conjugated estrogens (Premarin) 0.625 MG/GM vaginal cream Use 0.5 grams intravaginally 2 times per week at bedtime.   • Diclofenac Sodium (VOLTAREN) 1 % gel gel Apply 1 gram topically to indicated area 2 times daily as needed for mild to moderate pain.   • meclizine (ANTIVERT) 25 MG tablet Take 1/2-1 tablet by mouth 3 times daily as needed for dizziness.   • multivitamin with minerals tablet tablet Take 1 tablet by mouth Daily.   • Omega-3 1000 MG capsule Take  by mouth.   • omeprazole (priLOSEC) 40 MG capsule TAKE 1 CAPSULE EVERY MORNING   • ondansetron ODT (Zofran ODT) 4 MG  disintegrating tablet Place 1 tablet on the tongue Every 8 (Eight) Hours As Needed for Nausea or Vomiting.   • rosuvastatin (Crestor) 5 MG tablet Take 1 tablet by mouth Every Night. Labs in 6-8 weeks     No current facility-administered medications on file prior to visit.       Family History   Problem Relation Age of Onset   • Diabetes Other    • Kidney disease Other    • Hypertension Other        Social History     Socioeconomic History   • Marital status:    • Number of children: 1   Tobacco Use   • Smoking status: Never   • Smokeless tobacco: Never   Vaping Use   • Vaping Use: Never used   Substance and Sexual Activity   • Alcohol use: Not Currently   • Drug use: Never   • Sexual activity: Yes     Partners: Male         Review of Systems   Constitutional:  Negative for chills, diaphoresis, fatigue and fever.   HENT:  Negative for congestion, ear pain, hearing loss, nosebleeds, postnasal drip, rhinorrhea, sinus pressure and sore throat.    Eyes:  Negative for pain, discharge and itching.   Respiratory:  Negative for cough, chest tightness, shortness of breath and wheezing.    Cardiovascular:  Negative for chest pain, palpitations and leg swelling.   Gastrointestinal:  Negative for abdominal distention, abdominal pain, blood in stool, constipation, diarrhea, nausea and vomiting.        1/13 colonoscopy normal  8/22 colonoscopy with 2 polyps   Endocrine: Negative for polydipsia and polyuria.   Genitourinary:  Negative for difficulty urinating, dysuria, frequency and hematuria.        3/22 mammogram and followed by GYN   Musculoskeletal:  Positive for arthralgias. Negative for back pain, gait problem, joint swelling and myalgias.        See HPI   Skin:  Negative for rash and wound.   Neurological:  Positive for numbness (LUE). Negative for syncope, weakness and headaches.   Psychiatric/Behavioral:  Negative for dysphoric mood and sleep disturbance. The patient is not nervous/anxious.      /64 (BP  "Location: Left arm, Patient Position: Sitting)   Pulse 69   Ht 152.4 cm (60\")   Wt 68 kg (150 lb)   LMP  (LMP Unknown)   SpO2 97%   BMI 29.29 kg/m²       Physical Exam  Constitutional:       Appearance: Normal appearance. She is well-developed.   HENT:      Head: Normocephalic and atraumatic.      Right Ear: External ear normal.      Left Ear: External ear normal.      Nose: Nose normal.      Mouth/Throat:      Mouth: Mucous membranes are moist.      Pharynx: Oropharynx is clear.   Eyes:      Extraocular Movements: Extraocular movements intact.      Conjunctiva/sclera: Conjunctivae normal.      Pupils: Pupils are equal, round, and reactive to light.   Cardiovascular:      Rate and Rhythm: Normal rate and regular rhythm.      Heart sounds: Murmur (1/6) heard.   Pulmonary:      Effort: Pulmonary effort is normal.      Breath sounds: Normal breath sounds.   Abdominal:      General: Bowel sounds are normal.      Palpations: Abdomen is soft.   Musculoskeletal:         General: Normal range of motion.      Cervical back: Normal range of motion and neck supple.   Lymphadenopathy:      Cervical: No cervical adenopathy.   Skin:     General: Skin is warm and dry.   Neurological:      General: No focal deficit present.      Mental Status: She is alert and oriented to person, place, and time.   Psychiatric:         Mood and Affect: Mood normal.         Behavior: Behavior normal.         Thought Content: Thought content normal.       Procedure:      Discussion/Summary:    htn-cont on CCB and chlorthatlidone, goal of 130/80  hyperlipidemia-labs on Crestor today noted, wants to diet better prior to med escalation  gerd-stable on omeprazole   glucose intolerance-labs today and advised to watch carbs  osteoporosis-DEXA from 7/20 dw patient, on Boniva  xerosis-lachydrin prn, stable  DJD-tylenol prn and diclofenac gel  Diverticulosis-increase fiber  Heart murmur-check ECHO      6/14 labs noted and dw patient    Current " Outpatient Medications:   •  amLODIPine (NORVASC) 5 MG tablet, TAKE 1 TABLET DAILY, Disp: 90 tablet, Rfl: 3  •  ammonium lactate (LAC-HYDRIN) 12 % lotion, Apply  topically., Disp: , Rfl:   •  aspirin 81 MG tablet, Take  by mouth daily., Disp: , Rfl:   •  azelastine (ASTELIN) 0.1 % nasal spray, azelastine 137 mcg (0.1 %) nasal spray aerosol  U 1 SPR IEN BID, Disp: , Rfl:   •  chlorthalidone (HYGROTON) 25 MG tablet, TAKE 1 TABLET EVERY MORNING, Disp: 90 tablet, Rfl: 3  •  conjugated estrogens (Premarin) 0.625 MG/GM vaginal cream, Use 0.5 grams intravaginally 2 times per week at bedtime., Disp: 30 g, Rfl: 4  •  Diclofenac Sodium (VOLTAREN) 1 % gel gel, Apply 1 gram topically to indicated area 2 times daily as needed for mild to moderate pain., Disp: 100 g, Rfl: 2  •  meclizine (ANTIVERT) 25 MG tablet, Take 1/2-1 tablet by mouth 3 times daily as needed for dizziness., Disp: 30 tablet, Rfl: 2  •  multivitamin with minerals tablet tablet, Take 1 tablet by mouth Daily., Disp: , Rfl:   •  Omega-3 1000 MG capsule, Take  by mouth., Disp: , Rfl:   •  omeprazole (priLOSEC) 40 MG capsule, TAKE 1 CAPSULE EVERY MORNING, Disp: 90 capsule, Rfl: 3  •  ondansetron ODT (Zofran ODT) 4 MG disintegrating tablet, Place 1 tablet on the tongue Every 8 (Eight) Hours As Needed for Nausea or Vomiting., Disp: 15 tablet, Rfl: 1  •  rosuvastatin (Crestor) 5 MG tablet, Take 1 tablet by mouth Every Night. Labs in 6-8 weeks, Disp: 90 tablet, Rfl: 3        Diagnoses and all orders for this visit:    1. Primary hypertension (Primary)  -     CBC (No Diff)    2. Mixed hyperlipidemia  -     Comprehensive Metabolic Panel  -     Lipid Panel  -     TSH    3. Impaired glucose tolerance  -     Hemoglobin A1c    4. Sigmoid diverticulosis    5. Gastroesophageal reflux disease without esophagitis    6. Heart murmur  -     Adult Transthoracic Echo Complete W/ Cont if Necessary Per Protocol

## 2023-07-24 ENCOUNTER — OFFICE VISIT (OUTPATIENT)
Dept: ORTHOPEDIC SURGERY | Facility: CLINIC | Age: 78
End: 2023-07-24
Payer: MEDICARE

## 2023-07-24 VITALS
BODY MASS INDEX: 28.31 KG/M2 | DIASTOLIC BLOOD PRESSURE: 78 MMHG | WEIGHT: 144.2 LBS | SYSTOLIC BLOOD PRESSURE: 134 MMHG | HEIGHT: 60 IN

## 2023-07-24 DIAGNOSIS — M16.0 PRIMARY OSTEOARTHRITIS OF BOTH HIPS: Primary | ICD-10-CM

## 2023-07-24 PROCEDURE — 3078F DIAST BP <80 MM HG: CPT | Performed by: ORTHOPAEDIC SURGERY

## 2023-07-24 PROCEDURE — 3075F SYST BP GE 130 - 139MM HG: CPT | Performed by: ORTHOPAEDIC SURGERY

## 2023-07-24 PROCEDURE — 99214 OFFICE O/P EST MOD 30 MIN: CPT | Performed by: ORTHOPAEDIC SURGERY

## 2023-07-24 PROCEDURE — 1159F MED LIST DOCD IN RCRD: CPT | Performed by: ORTHOPAEDIC SURGERY

## 2023-07-24 PROCEDURE — 1160F RVW MEDS BY RX/DR IN RCRD: CPT | Performed by: ORTHOPAEDIC SURGERY

## 2023-07-24 NOTE — PROGRESS NOTES
[facility] Note    Subjective     Chief Complaint   Patient presents with    Follow-up     Bilateral hip pain        HPI    It has been 1  year(s) since Ms. Armando's last visit. She returns to clinic today for follow-up of bilateral hip pain. The issue has been ongoing for 1 year(s). She rates her pain a 8/10 on the pain scale. Previous/current treatments: NSAIDS and physical therapy. Current symptoms: pain and stiffness. The pain is worse with walking; Nothing improves the pain. Overall, she is doing worse.  Pain is located in the buttock area and lower back area bilaterally.  No groin pain.    I have reviewed the following portions of the patient's history and agree with: History of Present Illness and Review of Systems    Patient Active Problem List   Diagnosis    Gastroesophageal reflux disease without esophagitis    Impaired glucose tolerance    Hyperlipidemia    Hypertension    Osteoporosis    Asteatosis cutis    Cystic lesion of abdominal viscera    Well woman exam    Sigmoid diverticulosis    Heart murmur     Past Medical History:   Diagnosis Date    Arthritis     Hyperlipidemia     Hypertension       Past Surgical History:   Procedure Laterality Date    HYSTERECTOMY  11/1993    and ERG    TOTAL ABDOMINAL HYSTERECTOMY        Family History   Problem Relation Age of Onset    Diabetes Other     Kidney disease Other     Hypertension Other      Social History     Socioeconomic History    Marital status:     Number of children: 1    Highest education level: Master's degree (e.g., MA, MS, Cabrera, MEd, MSW, CYN)   Tobacco Use    Smoking status: Never    Smokeless tobacco: Never   Vaping Use    Vaping Use: Never used   Substance and Sexual Activity    Alcohol use: Not Currently    Drug use: Never    Sexual activity: Yes     Partners: Male      Current Outpatient Medications on File Prior to Visit   Medication Sig Dispense Refill    amLODIPine (NORVASC) 5 MG tablet TAKE 1 TABLET DAILY 90  tablet 3    aspirin 81 MG tablet Take  by mouth daily.      azelastine (ASTELIN) 0.1 % nasal spray azelastine 137 mcg (0.1 %) nasal spray aerosol   U 1 SPR IEN BID      baclofen (LIORESAL) 10 MG tablet Take 1 tablet by mouth 3 (Three) Times a Day.      chlorthalidone (HYGROTON) 25 MG tablet TAKE 1 TABLET EVERY MORNING 90 tablet 3    ibandronate (BONIVA) 150 MG tablet Take 1 tablet by mouth Every 30 (Thirty) Days.      meclizine (ANTIVERT) 25 MG tablet Take 1/2-1 tablet by mouth 3 times daily as needed for dizziness. 30 tablet 2    meloxicam (MOBIC) 15 MG tablet Take 1 tablet by mouth Daily.      multivitamin with minerals tablet tablet Take 1 tablet by mouth Daily.      Omega-3 1000 MG capsule Take  by mouth.      omeprazole (priLOSEC) 40 MG capsule TAKE 1 CAPSULE EVERY MORNING 90 capsule 3    rosuvastatin (Crestor) 5 MG tablet Take 1 tablet by mouth Every Night. Labs in 6-8 weeks 90 tablet 3     No current facility-administered medications on file prior to visit.      Allergies   Allergen Reactions    Codeine         Review of Systems   Constitutional:  Negative for activity change, appetite change, chills, diaphoresis, fatigue, fever and unexpected weight change.   HENT:  Negative for congestion, dental problem, drooling, ear discharge, ear pain, facial swelling, hearing loss, mouth sores, nosebleeds, postnasal drip, rhinorrhea, sinus pressure, sneezing, sore throat, tinnitus, trouble swallowing and voice change.    Eyes:  Negative for photophobia, pain, discharge, redness, itching and visual disturbance.   Respiratory:  Negative for apnea, cough, choking, chest tightness, shortness of breath, wheezing and stridor.    Cardiovascular:  Negative for chest pain, palpitations and leg swelling.   Gastrointestinal:  Negative for abdominal distention, abdominal pain, anal bleeding, blood in stool, constipation, diarrhea, nausea, rectal pain and vomiting.   Endocrine: Negative for cold intolerance, heat intolerance,  "polydipsia, polyphagia and polyuria.   Genitourinary:  Negative for decreased urine volume, difficulty urinating, dysuria, enuresis, flank pain, frequency, genital sores, hematuria and urgency.   Musculoskeletal:  Positive for arthralgias. Negative for back pain, gait problem, joint swelling, myalgias, neck pain and neck stiffness.   Skin:  Negative for color change, pallor, rash and wound.   Allergic/Immunologic: Negative for environmental allergies, food allergies and immunocompromised state.   Neurological:  Negative for dizziness, tremors, seizures, syncope, facial asymmetry, speech difficulty, weakness, light-headedness, numbness and headaches.   Hematological:  Negative for adenopathy. Does not bruise/bleed easily.   Psychiatric/Behavioral:  Negative for agitation, behavioral problems, confusion, decreased concentration, dysphoric mood, hallucinations, self-injury, sleep disturbance and suicidal ideas. The patient is not nervous/anxious and is not hyperactive.       Objective      Physical Exam  /78   Ht 152.4 cm (60\")   Wt 65.4 kg (144 lb 3.2 oz)   LMP  (LMP Unknown)   BMI 28.16 kg/m²     Body mass index is 28.16 kg/m².    General:   Mental Status:  Alert   Appearance: Cooperative, in no acute distress   Build and Nutrition: Well-nourished well-developed female   Orientation: Alert and oriented to person, place and time   Posture: Normal   Gait: Nonantalgic/normal    Lower Extremities:              Right Hip:                          Tenderness:    None                          Swelling:          None                          Crepitus:          None                          Atrophy:           None                          Range of motion:        External Rotation:       40°                                                              Internal Rotation:        40°                                                              Flexion:                       100°                                           "                    Extension:                   0°           Instability:        None  Deformities:     None  Functional testing: Negative Stinchfield  No leg length discrepancy              Left Hip:                          Tenderness:    None                          Swelling:          None                          Crepitus:          None                          Atrophy:           None                          Range of motion:        External Rotation:       40°                                                              Internal Rotation:        40°                                                              Flexion:                       100°                                                              Extension:                   0°           Instability:        None  Deformities:     None  Functional testing: Negative Stinchfield                          No leg length discrepancy    Imaging/Studies  Imaging Results (Last 24 Hours)       Procedure Component Value Units Date/Time    XR Hips Bilateral With or Without Pelvis 3-4 View [[id number]] Resulted: 07/24/23 1559     Updated: 07/24/23 1600    Narrative:      Right Hip Radiographs  Indication: right hip pain  Views: low AP pelvis and lateral of the right hip    Comparison: no prior studies available for review    Findings:   Mild degenerative changes.  No acute bony abnormalities.  Good alignment.    Left Hip Radiographs  Indication: left hip pain  Views: low AP pelvis and lateral of the left hip    Comparison: no prior studies available for review    Findings:   Mild degenerative changes, no acute bony abnormalities.  Good alignment.              Assessment and Plan     Diagnoses and all orders for this visit:    1. Primary osteoarthritis of both hips (Primary)  -     XR Hips Bilateral With or Without Pelvis 3-4 View  -     Ambulatory Referral to Pain Management Clinic        1. Primary osteoarthritis of both hips        I reviewed my findings with the  patient.  Although she does have arthritis in her hips, this does not appear to be her pain generator.  Only mild radiographic signs of arthritis.  Previous MRI has been obtained, which showed the same.  Her pain generator may be her SI joints or low back.  I have recommended referral to a pain management specialist for consideration of directed injections if appropriate.      Return if symptoms worsen or fail to improve, for Referral to Dr. Sheikh (pain management).      Go Calderon MD  07/24/23  16:07 EDT

## 2023-07-27 ENCOUNTER — HOSPITAL ENCOUNTER (OUTPATIENT)
Dept: CARDIOLOGY | Facility: HOSPITAL | Age: 78
Discharge: HOME | End: 2023-07-27
Admitting: INTERNAL MEDICINE
Payer: MEDICARE

## 2023-07-27 VITALS — WEIGHT: 143.3 LBS | HEIGHT: 60 IN | BODY MASS INDEX: 28.13 KG/M2

## 2023-07-27 LAB
ASCENDING AORTA: 3 CM
BH CV ECHO MEAS - AO MAX PG: 7.7 MMHG
BH CV ECHO MEAS - AO MEAN PG: 4 MMHG
BH CV ECHO MEAS - AO ROOT DIAM: 2.9 CM
BH CV ECHO MEAS - AO V2 MAX: 139 CM/SEC
BH CV ECHO MEAS - AO V2 VTI: 29.7 CM
BH CV ECHO MEAS - AVA(I,D): 2.05 CM2
BH CV ECHO MEAS - EDV(CUBED): 93.6 ML
BH CV ECHO MEAS - EDV(MOD-SP2): 66 ML
BH CV ECHO MEAS - EDV(MOD-SP4): 67 ML
BH CV ECHO MEAS - EF(MOD-BP): 58 %
BH CV ECHO MEAS - EF(MOD-SP2): 59.1 %
BH CV ECHO MEAS - EF(MOD-SP4): 56.7 %
BH CV ECHO MEAS - ESV(CUBED): 24.9 ML
BH CV ECHO MEAS - ESV(MOD-SP2): 27 ML
BH CV ECHO MEAS - ESV(MOD-SP4): 29 ML
BH CV ECHO MEAS - FS: 35.7 %
BH CV ECHO MEAS - IVS/LVPW: 0.96 CM
BH CV ECHO MEAS - IVSD: 0.99 CM
BH CV ECHO MEAS - LA DIMENSION: 3.9 CM
BH CV ECHO MEAS - LV MASS(C)D: 157.3 GRAMS
BH CV ECHO MEAS - LV MAX PG: 4.7 MMHG
BH CV ECHO MEAS - LV MEAN PG: 2 MMHG
BH CV ECHO MEAS - LV V1 MAX: 108 CM/SEC
BH CV ECHO MEAS - LV V1 VTI: 23.9 CM
BH CV ECHO MEAS - LVIDD: 4.5 CM
BH CV ECHO MEAS - LVIDS: 2.9 CM
BH CV ECHO MEAS - LVOT AREA: 2.5 CM2
BH CV ECHO MEAS - LVOT DIAM: 1.8 CM
BH CV ECHO MEAS - LVPWD: 1.03 CM
BH CV ECHO MEAS - MV A MAX VEL: 66.1 CM/SEC
BH CV ECHO MEAS - MV DEC SLOPE: 457 CM/SEC2
BH CV ECHO MEAS - MV DEC TIME: 0.24 MSEC
BH CV ECHO MEAS - MV E MAX VEL: 57.3 CM/SEC
BH CV ECHO MEAS - MV E/A: 0.87
BH CV ECHO MEAS - MV MAX PG: 3 MMHG
BH CV ECHO MEAS - MV MEAN PG: 1 MMHG
BH CV ECHO MEAS - MV P1/2T: 54.8 MSEC
BH CV ECHO MEAS - MV V2 VTI: 28.2 CM
BH CV ECHO MEAS - MVA(P1/2T): 4 CM2
BH CV ECHO MEAS - MVA(VTI): 2.16 CM2
BH CV ECHO MEAS - PA ACC SLOPE: 829.5 CM/SEC2
BH CV ECHO MEAS - PA ACC TIME: 0.1 SEC
BH CV ECHO MEAS - SV(LVOT): 60.8 ML
BH CV ECHO MEAS - SV(MOD-SP2): 39 ML
BH CV ECHO MEAS - SV(MOD-SP4): 38 ML
BH CV ECHO MEAS - TAPSE (>1.6): 1.8 CM
BH CV ECHO MEAS - TR MAX PG: 19.4 MMHG
BH CV ECHO MEAS - TR MAX VEL: 218.8 CM/SEC
BH CV VAS BP RIGHT ARM: NORMAL MMHG
BH CV XLRA - RV BASE: 2.8 CM
BH CV XLRA - RV LENGTH: 4.9 CM
BH CV XLRA - RV MID: 3.1 CM
BH CV XLRA - TDI S': 4.9 CM/SEC
LEFT ATRIUM VOLUME INDEX: 29.6 ML/M2

## 2023-07-27 PROCEDURE — 93306 TTE W/DOPPLER COMPLETE: CPT | Performed by: INTERNAL MEDICINE

## 2023-07-27 PROCEDURE — 93306 TTE W/DOPPLER COMPLETE: CPT

## 2023-08-16 ENCOUNTER — OFFICE VISIT (OUTPATIENT)
Dept: PAIN MEDICINE | Facility: CLINIC | Age: 78
End: 2023-08-16
Payer: MEDICARE

## 2023-08-16 VITALS
BODY MASS INDEX: 29.92 KG/M2 | TEMPERATURE: 95.9 F | HEIGHT: 60 IN | WEIGHT: 152.4 LBS | HEART RATE: 57 BPM | OXYGEN SATURATION: 98 %

## 2023-08-16 DIAGNOSIS — G89.29 CHRONIC BILATERAL LOW BACK PAIN WITHOUT SCIATICA: Primary | ICD-10-CM

## 2023-08-16 DIAGNOSIS — M54.50 CHRONIC BILATERAL LOW BACK PAIN WITHOUT SCIATICA: Primary | ICD-10-CM

## 2023-08-16 PROCEDURE — 1125F AMNT PAIN NOTED PAIN PRSNT: CPT | Performed by: STUDENT IN AN ORGANIZED HEALTH CARE EDUCATION/TRAINING PROGRAM

## 2023-08-16 PROCEDURE — 1159F MED LIST DOCD IN RCRD: CPT | Performed by: STUDENT IN AN ORGANIZED HEALTH CARE EDUCATION/TRAINING PROGRAM

## 2023-08-16 PROCEDURE — 1160F RVW MEDS BY RX/DR IN RCRD: CPT | Performed by: STUDENT IN AN ORGANIZED HEALTH CARE EDUCATION/TRAINING PROGRAM

## 2023-08-16 PROCEDURE — 99203 OFFICE O/P NEW LOW 30 MIN: CPT | Performed by: STUDENT IN AN ORGANIZED HEALTH CARE EDUCATION/TRAINING PROGRAM

## 2023-08-16 NOTE — PROGRESS NOTES
Referring Physician: Go Calderon MD  [address] [facility]  [address] [place] [address]    Primary Physician: Edison Herring MD    CHIEF COMPLAINT or REASON FOR VISIT: No chief complaint on file.      Initial history of present illness on 08/16/2023:  Ms. Marleni Armando is 78 y.o. female who presents as a new patient referral for evaluation treatment bilateral buttock pain.  Ms. Armando states that this has been ongoing but worsening without any specific inciting event or trauma over the last several months.  She describes achy bilateral buttock pain worst in the morning.  She does not particular associate this with ambulation but does have a relationship with standing.  She denies any lower extremity radiation or low back pain.  Patient denies any bowel or bladder dysfunction, lower extremity weakness, new onset saddle anesthesia or unexplained weight loss.     She has been engaged in physical therapy since March 2023 with some improvement.  She was evaluated with bilateral hip x-rays and bilateral hip MRI as well as consultation with orthopedic surgery, Dr. Calderon, who felt that her etiology was likely neuraxial.  She is also under treatment at the [facility] where she has had lumbar x-ray demonstrating multilevel spondylosis and concern for diffuse idiopathic skeletal hyperostosis (DISH).  She has not had any spine surgeries or interventions.  She describes frequent muscle cramping in her buttocks and proximal posterior thighs.  Baclofen caused nausea.    Interval history:    Interventions:      Objective Pain Scoring:   BRIEF PAIN INVENTORY:  Total score:   Pain Score    08/16/23 0742   PainSc:   2   PainLoc: Leg  Comment: back of both legs      PHQ-2: PHQ-2 Total Score: 1  PHQ-9: PHQ-9: Brief Depression Severity Measure Score: 1  Opioid Risk Tool:         Review of Systems:   ROS negative except as otherwise noted     Past Medical History:   Past Medical History:   Diagnosis  Date    Arthritis     Hyperlipidemia     Hypertension          Past Surgical History:   Past Surgical History:   Procedure Laterality Date    HYSTERECTOMY  11/1993    and REG    TOTAL ABDOMINAL HYSTERECTOMY           Family History   Family History   Problem Relation Age of Onset    Diabetes Other     Kidney disease Other     Hypertension Other          Social History   Social History     Socioeconomic History    Marital status:     Number of children: 1    Highest education level: Master's degree (e.g., MA, MS, Cabrera, MEd, MSW, CYN)   Tobacco Use    Smoking status: Never    Smokeless tobacco: Never   Vaping Use    Vaping Use: Never used   Substance and Sexual Activity    Alcohol use: Not Currently    Drug use: Never    Sexual activity: Yes     Partners: Male        Medications:     Current Outpatient Medications:     amLODIPine (NORVASC) 5 MG tablet, TAKE 1 TABLET DAILY, Disp: 90 tablet, Rfl: 3    aspirin 81 MG tablet, Take  by mouth daily., Disp: , Rfl:     azelastine (ASTELIN) 0.1 % nasal spray, azelastine 137 mcg (0.1 %) nasal spray aerosol  U 1 SPR IEN BID, Disp: , Rfl:     chlorthalidone (HYGROTON) 25 MG tablet, TAKE 1 TABLET EVERY MORNING, Disp: 90 tablet, Rfl: 3    ibandronate (BONIVA) 150 MG tablet, Take 1 tablet by mouth Every 30 (Thirty) Days., Disp: , Rfl:     meclizine (ANTIVERT) 25 MG tablet, Take 1/2-1 tablet by mouth 3 times daily as needed for dizziness., Disp: 30 tablet, Rfl: 2    meloxicam (MOBIC) 15 MG tablet, Take 1 tablet by mouth Daily., Disp: , Rfl:     multivitamin with minerals tablet tablet, Take 1 tablet by mouth Daily., Disp: , Rfl:     Omega-3 1000 MG capsule, Take  by mouth., Disp: , Rfl:     omeprazole (priLOSEC) 40 MG capsule, TAKE 1 CAPSULE EVERY MORNING, Disp: 90 capsule, Rfl: 3    rosuvastatin (Crestor) 5 MG tablet, Take 1 tablet by mouth Every Night. Labs in 6-8 weeks, Disp: 90 tablet, Rfl: 3        Physical Exam:     Vitals:    08/16/23 0742   Pulse: 57   Temp: 95.9 øF  "(35.5 øC)   SpO2: 98%   Weight: 69.1 kg (152 lb 6.4 oz)   Height: 152.4 cm (60\")   PainSc:   2   PainLoc: Leg  Comment: back of both legs        General: Alert and oriented, No acute distress.   HEENT: Normocephalic, atraumatic.   Cardiovascular: No gross edema  Respiratory: Respirations are non-labored    Cervical Spine:   No masses or atrophy  Range of motion - Flexion normal. Extension normal. Lateral rotation normal.   Palpation - nontender   Spurling's - negative     Thoracic Spine:   Inspection: no gross abnormality  Paraspinal muscle palpation: nontender  Spinous process palpation: nontender    Lumbar Spine:   No masses or atrophy  Range of motion - Flexion normal. Extension normal. Right Lat Bending normal. Left Lat Bending normal  Facet Loading: Negative bilaterally  Facet Palpation - Nontender   PSIS tenderness - Negative bilaterally  Tony's/OSVALDO/Thigh thrust - Negative bilaterally  Straight leg raise: Negative bilaterally  Slump test: Negative bilaterally    No tenderness with palpation of bilateral ischial bursae  No tenderness with palpation of bilateral greater trochanters  Negative hip scours bilaterally  Motor Exam:    Strength: Rate on 1-5 scale Right Left    C5-Elbow flexion, Deltoid 5 5    C6-Wrist extension 5 5    C7- Elbow / finger extension 5 5    C8- Finger flexion 5 5    T1- Intrinsics hand 5 5    Strength: Rate on 1-5 scale Right Left    L1/2- hip flexion 5 5    L3- knee extension 5 5    L4- ankle dorsiflexion 5 5    L5- great toe extension 5 5    S1- ankle plantarflexion 5 5    Sensory Exam: Full and equal sensation to light touch throughout.    Neurologic: Cranial Nerves II-XII are grossly intact.   Psychiatric: Cooperative.   Gait: Normal   Assistive Devices: None    Imaging Studies:   No results found for this or any previous visit.      Impression & Plan:   08/16/2023: Marleni Armando is a 78 y.o. female with past medical history significant for GERD, HLD, HTN who presents to the " pain clinic for evaluation and treatment of bilateral buttock pain.  On physical exam no provocative maneuvers are able to reproduce her pain.  Clinical examination consistent with claudication versus facet spondylosis.  Will obtain lumbar MRI to further evaluate.    1. Chronic bilateral low back pain without sciatica        PLAN:  1. Medication Recommendations: Recommend Voltaren topical, NSAIDs, Tylenol.  Can trial turmeric 500 mg twice daily if NSAID contraindicated.    2. Physical Therapy: Continue PT    3. Psychological: defer    4. Complementary and alternative (CAM) Therapies:     5. Labs: None indicated     6. Imaging: Order MRI lumbar spine without contrast    7. Interventions: None indicated     8. Referrals: None indicated     9. Records requested: None    10. Lifestyle goals:    Follow-up 2 months after MRI      [facility]  Yefri Sheikh MD

## 2023-09-14 DIAGNOSIS — E78.2 MIXED HYPERLIPIDEMIA: ICD-10-CM

## 2023-09-14 RX ORDER — ROSUVASTATIN CALCIUM 5 MG/1
TABLET, COATED ORAL
Qty: 90 TABLET | Refills: 3 | Status: SHIPPED | OUTPATIENT
Start: 2023-09-14

## 2023-09-21 ENCOUNTER — HOSPITAL ENCOUNTER (OUTPATIENT)
Dept: MRI IMAGING | Facility: HOSPITAL | Age: 78
Discharge: HOME | End: 2023-09-21
Admitting: STUDENT IN AN ORGANIZED HEALTH CARE EDUCATION/TRAINING PROGRAM
Payer: MEDICARE

## 2023-09-21 DIAGNOSIS — G89.29 CHRONIC BILATERAL LOW BACK PAIN WITHOUT SCIATICA: ICD-10-CM

## 2023-09-21 DIAGNOSIS — M54.50 CHRONIC BILATERAL LOW BACK PAIN WITHOUT SCIATICA: ICD-10-CM

## 2023-09-21 PROCEDURE — 72148 MRI LUMBAR SPINE W/O DYE: CPT

## 2023-09-25 ENCOUNTER — TELEPHONE (OUTPATIENT)
Dept: PAIN MEDICINE | Facility: CLINIC | Age: 78
End: 2023-09-25

## 2023-09-25 DIAGNOSIS — G89.29 CHRONIC BILATERAL LOW BACK PAIN WITHOUT SCIATICA: Primary | ICD-10-CM

## 2023-09-25 DIAGNOSIS — M54.50 CHRONIC BILATERAL LOW BACK PAIN WITHOUT SCIATICA: Primary | ICD-10-CM

## 2023-09-26 NOTE — TELEPHONE ENCOUNTER
Called the patient and advised her DR. Sheikh would like for her to get an x-ray, and rescheduled her follow up to an earlier date.

## 2023-09-26 NOTE — TELEPHONE ENCOUNTER
PT CALLED BACK BUT ATTEMPTED TO WT OVER AND WAS UNSUCCESSFUL. PLEASE CALL BACK - [phone]

## 2023-09-26 NOTE — TELEPHONE ENCOUNTER
Hub staff attempted to follow warm transfer process and was unsuccessful     Caller: Marleni Armando    Relationship to patient: Self    Best call back number: [phone]    Patient is needing: RETURNING A PHONE CALL FROM SETH

## 2023-09-27 ENCOUNTER — HOSPITAL ENCOUNTER (OUTPATIENT)
Dept: GENERAL RADIOLOGY | Facility: HOSPITAL | Age: 78
Discharge: HOME | End: 2023-09-27
Admitting: STUDENT IN AN ORGANIZED HEALTH CARE EDUCATION/TRAINING PROGRAM
Payer: MEDICARE

## 2023-09-27 DIAGNOSIS — M54.50 CHRONIC BILATERAL LOW BACK PAIN WITHOUT SCIATICA: ICD-10-CM

## 2023-09-27 DIAGNOSIS — G89.29 CHRONIC BILATERAL LOW BACK PAIN WITHOUT SCIATICA: ICD-10-CM

## 2023-09-27 PROCEDURE — 72114 X-RAY EXAM L-S SPINE BENDING: CPT

## 2023-10-02 ENCOUNTER — OFFICE VISIT (OUTPATIENT)
Dept: PAIN MEDICINE | Facility: CLINIC | Age: 78
End: 2023-10-02
Payer: MEDICARE

## 2023-10-02 VITALS
TEMPERATURE: 96.3 F | HEIGHT: 60 IN | DIASTOLIC BLOOD PRESSURE: 74 MMHG | HEART RATE: 64 BPM | OXYGEN SATURATION: 99 % | BODY MASS INDEX: 29.28 KG/M2 | SYSTOLIC BLOOD PRESSURE: 126 MMHG | RESPIRATION RATE: 14 BRPM | WEIGHT: 149.14 LBS

## 2023-10-02 DIAGNOSIS — M48.062 SPINAL STENOSIS OF LUMBAR REGION WITH NEUROGENIC CLAUDICATION: Primary | ICD-10-CM

## 2023-10-02 PROCEDURE — 3074F SYST BP LT 130 MM HG: CPT | Performed by: STUDENT IN AN ORGANIZED HEALTH CARE EDUCATION/TRAINING PROGRAM

## 2023-10-02 PROCEDURE — 1125F AMNT PAIN NOTED PAIN PRSNT: CPT | Performed by: STUDENT IN AN ORGANIZED HEALTH CARE EDUCATION/TRAINING PROGRAM

## 2023-10-02 PROCEDURE — 99214 OFFICE O/P EST MOD 30 MIN: CPT | Performed by: STUDENT IN AN ORGANIZED HEALTH CARE EDUCATION/TRAINING PROGRAM

## 2023-10-02 PROCEDURE — 1159F MED LIST DOCD IN RCRD: CPT | Performed by: STUDENT IN AN ORGANIZED HEALTH CARE EDUCATION/TRAINING PROGRAM

## 2023-10-02 PROCEDURE — 3078F DIAST BP <80 MM HG: CPT | Performed by: STUDENT IN AN ORGANIZED HEALTH CARE EDUCATION/TRAINING PROGRAM

## 2023-10-02 PROCEDURE — 1160F RVW MEDS BY RX/DR IN RCRD: CPT | Performed by: STUDENT IN AN ORGANIZED HEALTH CARE EDUCATION/TRAINING PROGRAM

## 2023-10-02 NOTE — PROGRESS NOTES
Referring Physician: No referring provider defined for this encounter.    Primary Physician: Edison Herring MD    CHIEF COMPLAINT or REASON FOR VISIT: Back Pain and Follow-up      Initial history of present illness on 08/16/2023:  Ms. Marleni Armando is 78 y.o. female who presents as a new patient referral for evaluation treatment bilateral buttock pain.  Ms. Armando states that this has been ongoing but worsening without any specific inciting event or trauma over the last several months.  She describes achy bilateral buttock pain worst in the morning.  She does not particular associate this with ambulation but does have a relationship with standing.  She denies any lower extremity radiation or low back pain.  Patient denies any bowel or bladder dysfunction, lower extremity weakness, new onset saddle anesthesia or unexplained weight loss.     She has been engaged in physical therapy since March 2023 with some improvement.  She was evaluated with bilateral hip x-rays and bilateral hip MRI as well as consultation with orthopedic surgery, Dr. Calderon, who felt that her etiology was likely neuraxial.  She is also under treatment at the [facility] where she has had lumbar x-ray demonstrating multilevel spondylosis and concern for diffuse idiopathic skeletal hyperostosis (DISH).  She has not had any spine surgeries or interventions.  She describes frequent muscle cramping in her buttocks and proximal posterior thighs.  Baclofen caused nausea.    Interval history: Patient returns to clinic after undergoing a lumbar MRI to review her images.    Interventions:  None    Objective Pain Scoring:   BRIEF PAIN INVENTORY:  Total score:   Pain Score    10/02/23 1352   PainSc:   7   PainLoc: Back      PHQ-2: PHQ-2 Total Score: 2  PHQ-9: PHQ-9: Brief Depression Severity Measure Score: 3  Opioid Risk Tool:         Review of Systems:   ROS negative except as otherwise noted     Past Medical History:   Past Medical  History:   Diagnosis Date    Arthritis     Hyperlipidemia     Hypertension          Past Surgical History:   Past Surgical History:   Procedure Laterality Date    HYSTERECTOMY  11/1993    and REG    TOTAL ABDOMINAL HYSTERECTOMY           Family History   Family History   Problem Relation Age of Onset    Diabetes Other     Kidney disease Other     Hypertension Other          Social History   Social History     Socioeconomic History    Marital status:     Number of children: 1    Highest education level: Master's degree (e.g., MA, MS, Cabrera, MEd, MSW, CYN)   Tobacco Use    Smoking status: Never    Smokeless tobacco: Never   Vaping Use    Vaping Use: Never used   Substance and Sexual Activity    Alcohol use: Not Currently    Drug use: Never    Sexual activity: Yes     Partners: Male        Medications:     Current Outpatient Medications:     amLODIPine (NORVASC) 5 MG tablet, TAKE 1 TABLET DAILY, Disp: 90 tablet, Rfl: 3    aspirin 81 MG tablet, Take  by mouth daily., Disp: , Rfl:     azelastine (ASTELIN) 0.1 % nasal spray, azelastine 137 mcg (0.1 %) nasal spray aerosol  U 1 SPR IEN BID, Disp: , Rfl:     chlorthalidone (HYGROTON) 25 MG tablet, TAKE 1 TABLET EVERY MORNING, Disp: 90 tablet, Rfl: 3    ibandronate (BONIVA) 150 MG tablet, Take 1 tablet by mouth Every 30 (Thirty) Days., Disp: , Rfl:     meclizine (ANTIVERT) 25 MG tablet, Take 1/2-1 tablet by mouth 3 times daily as needed for dizziness., Disp: 30 tablet, Rfl: 2    meloxicam (MOBIC) 15 MG tablet, Take 1 tablet by mouth Daily., Disp: , Rfl:     multivitamin with minerals tablet tablet, Take 1 tablet by mouth Daily., Disp: , Rfl:     Omega-3 1000 MG capsule, Take  by mouth., Disp: , Rfl:     omeprazole (priLOSEC) 40 MG capsule, TAKE 1 CAPSULE EVERY MORNING, Disp: 90 capsule, Rfl: 3    rosuvastatin (CRESTOR) 5 MG tablet, TAKE 1 TABLET EVERY NIGHT   (LABS IN 6 TO 8 WEEKS), Disp: 90 tablet, Rfl: 3        Physical Exam:     Vitals:    10/02/23 1352   BP: 126/74  "  Pulse: 64   Resp: 14   Temp: 96.3 °F (35.7 °C)   SpO2: 99%   Weight: 67.6 kg (149 lb 2.2 oz)   Height: 152.4 cm (60\")   PainSc:   7   PainLoc: Back        General: Alert and oriented, No acute distress.   HEENT: Normocephalic, atraumatic.   Cardiovascular: No gross edema  Respiratory: Respirations are non-labored    Cervical Spine:   No masses or atrophy  Range of motion - Flexion normal. Extension normal. Lateral rotation normal.   Palpation - nontender   Spurling's - negative     Thoracic Spine:   Inspection: no gross abnormality  Paraspinal muscle palpation: nontender  Spinous process palpation: nontender    Lumbar Spine:   No masses or atrophy  Range of motion - Flexion normal. Extension normal. Right Lat Bending normal. Left Lat Bending normal  Facet Loading: Negative bilaterally  Facet Palpation - Nontender   PSIS tenderness - Negative bilaterally  Tony's/OSVALDO/Thigh thrust - Negative bilaterally  Straight leg raise: Negative bilaterally  Slump test: Negative bilaterally    No tenderness with palpation of bilateral ischial bursae  No tenderness with palpation of bilateral greater trochanters  Negative hip scours bilaterally  Motor Exam:    Strength: Rate on 1-5 scale Right Left    C5-Elbow flexion, Deltoid 5 5    C6-Wrist extension 5 5    C7- Elbow / finger extension 5 5    C8- Finger flexion 5 5    T1- Intrinsics hand 5 5    Strength: Rate on 1-5 scale Right Left    L1/2- hip flexion 5 5    L3- knee extension 5 5    L4- ankle dorsiflexion 5 5    L5- great toe extension 5 5    S1- ankle plantarflexion 5 5    Sensory Exam: Full and equal sensation to light touch throughout.    Neurologic: Cranial Nerves II-XII are grossly intact.   Psychiatric: Cooperative.   Gait: Normal   Assistive Devices: None    Imaging Studies:   No results found for this or any previous visit.      Impression & Plan:   08/16/2023: Marleni Armando is a 78 y.o. female with past medical history significant for GERD, HLD, HTN who " presents to the pain clinic for evaluation and treatment of bilateral buttock pain.  On physical exam no provocative maneuvers are able to reproduce her pain.  Clinical examination consistent with claudication versus facet spondylosis.  Will obtain lumbar MRI to further evaluate.  10/2/23: I personally reviewed and interpreted her lumbar MRI dated September 21, 2023 which demonstrates grade 1 anterolisthesis of L3 on L4 with severe canal stenosis.  Lumbar x-ray did not show any dynamic listhesis.  We discussed epidural steroid injection to improve pain.  If greater than 50% relief for at least 2-3 months can consider repeat as needed every 3 to 4 months.  I had an in-depth discussion with the patient regarding the risks of the procedure including bleeding, infection, damage to surrounding structures, paralysis.  We discussed the potential adverse effects of corticosteroid injection including flushing of the face, lipodystrophy, skin discoloration, elevated blood glucose, increased blood pressure.  Risks of frequent steroid administration include weight gain, hormonal changes, mood changes, osteoporosis.    1. Spinal stenosis of lumbar region with neurogenic claudication          PLAN:  1. Medication Recommendations: Recommend Voltaren topical, NSAIDs, Tylenol.  Can trial turmeric 500 mg twice daily if NSAID contraindicated.    2. Physical Therapy: Continue HEP    3. Psychological: defer    4. Complementary and alternative (CAM) Therapies:     5. Labs: None indicated     6. Imaging: MRI independently interpreted and reviewed with patient    7. Interventions: Schedule L2/3 interlaminar epidural steroid injection (31154)    8. Referrals: Consider neurosurgery referral if mild only transient benefit from epidural steroid injection.    9. Records requested: None    10. Lifestyle goals:    Follow-up 6 weeks after injection      [facility]  Yefri Sheikh MD

## 2023-10-18 ENCOUNTER — TELEPHONE (OUTPATIENT)
Dept: INTERNAL MEDICINE | Facility: CLINIC | Age: 78
End: 2023-10-18

## 2023-10-18 DIAGNOSIS — R73.02 IMPAIRED GLUCOSE TOLERANCE: ICD-10-CM

## 2023-10-18 DIAGNOSIS — E78.2 MIXED HYPERLIPIDEMIA: Primary | ICD-10-CM

## 2023-10-18 NOTE — TELEPHONE ENCOUNTER
Caller: Marleni Armando    Relationship: Self    Best call back number: [phone]    What orders are you requesting (i.e. lab or imaging): LAB    In what timeframe would the patient need to come in: TOMORROW    Where will you receive your lab/imaging services: OFFICE    Additional notes: PT WOULD LIKE TO COME IN TOMORROW AND GET LABS DONE BUT NEEDS ORDERS PLACED. PLEASE CALL PATIENT WHEN THOSE ARE PLACED

## 2023-10-19 ENCOUNTER — DOCUMENTATION (OUTPATIENT)
Dept: PAIN MEDICINE | Facility: CLINIC | Age: 78
End: 2023-10-19

## 2023-10-19 ENCOUNTER — OUTSIDE FACILITY SERVICE (OUTPATIENT)
Dept: PAIN MEDICINE | Facility: CLINIC | Age: 78
End: 2023-10-19
Payer: MEDICARE

## 2023-10-19 NOTE — PROGRESS NOTES
[facility]  [address] [place] [id number]      PROCEDURE: Fluoroscopically-guided L2/3 lumbar Interlaminar Epidural Steroid Injection     PRE-OP DIAGNOSIS: Lumbar spinal stenosis with neurogenic claudication  POST-OP DIAGNOSIS: Same    BLOOD THINNERS (ANTIPLATELETS/ANTICOAGULANTS): Were discussed with the patient and SACHI Guidelines were followed.     CONSENT: Risks, benefits and options were explained to the patient, all questions were answered and written informed consent was obtained.     ANESTHESIA: Local Only     PROCEDURE NOTE: A pre-procedural time out was performed to confirm the correct patient, procedure, side, and site. Standard ASA monitors were applied and oxygen via nasal cannula was provided. All proceduralists donned sterile gloves with masks and surgical hats. The patient was placed prone with pillow under the abdomen and all pressure points padded. The patient's lumbar spine was prepped in standard fashion using [Chlorhexidine] and draped with sterile towels. The target lumbar interspace was identified using fluoroscopy. The overlying skin and subcutaneous tissue was anesthetized with 1% lidocaine. A 20 gauge 10 cm Tuohy needle was advanced using intermittent AP and lateral fluoroscopy. The ligamentum flavum was engaged. Access to the epidural space was gained using a loss of resistance technique to air. Needle placement was confirmed with 1 ml of Omnipaque 180 mgI/ml contrast using biplanar live fluoroscopic imaging. An epidurogram was noted without evidence of intravascular or intrathecal spread. After negative aspiration, a mixture containing 3 ml of preservative-free normal saline, dexamethasone 10mg steroid, lidocaine 1% - 2 ml local anesthetic for a total volume of 6 ml was injected under direct visualization with fluoroscopy. The Tuohy needle was flushed, removed and a bandage applied.     EBL: None     COMPLICATIONS: None     The patient tolerated the  procedure well. Vital signs were stable. Sensory and motor exam was unchanged from baseline. The patient was observed in recovery and was discharged after meeting established criteria.    FOLLOW UP: As scheduled     ADDITIONAL NOTES:     [facility]   Yefri Sheikh MD      CODES:  [id number]

## 2023-10-20 ENCOUNTER — LAB (OUTPATIENT)
Dept: INTERNAL MEDICINE | Facility: CLINIC | Age: 78
End: 2023-10-20
Payer: MEDICARE

## 2023-10-20 DIAGNOSIS — E78.2 MIXED HYPERLIPIDEMIA: Primary | ICD-10-CM

## 2023-10-20 LAB
ALBUMIN SERPL-MCNC: 4.4 G/DL (ref 3.5–5.2)
ALBUMIN/GLOB SERPL: 1.5 G/DL
ALP SERPL-CCNC: 54 U/L (ref 39–117)
ALT SERPL W P-5'-P-CCNC: 16 U/L (ref 1–33)
ANION GAP SERPL CALCULATED.3IONS-SCNC: 13.2 MMOL/L (ref 5–15)
AST SERPL-CCNC: 17 U/L (ref 1–32)
BILIRUB SERPL-MCNC: 0.2 MG/DL (ref 0–1.2)
BUN SERPL-MCNC: 17 MG/DL (ref 8–23)
BUN/CREAT SERPL: 14.2 (ref 7–25)
CALCIUM SPEC-SCNC: 9.7 MG/DL (ref 8.6–10.5)
CHLORIDE SERPL-SCNC: 104 MMOL/L (ref 98–107)
CHOLEST SERPL-MCNC: 176 MG/DL (ref 0–200)
CO2 SERPL-SCNC: 22.8 MMOL/L (ref 22–29)
CREAT SERPL-MCNC: 1.2 MG/DL (ref 0.57–1)
EGFRCR SERPLBLD CKD-EPI 2021: 47 ML/MIN/1.73
GLOBULIN UR ELPH-MCNC: 3 GM/DL
GLUCOSE SERPL-MCNC: 112 MG/DL (ref 65–99)
HBA1C MFR BLD: 6 % (ref 4.8–5.6)
HDLC SERPL-MCNC: 68 MG/DL (ref 40–60)
LDLC SERPL CALC-MCNC: 100 MG/DL (ref 0–100)
LDLC/HDLC SERPL: 1.47 {RATIO}
POTASSIUM SERPL-SCNC: 3.2 MMOL/L (ref 3.5–5.2)
PROT SERPL-MCNC: 7.4 G/DL (ref 6–8.5)
SODIUM SERPL-SCNC: 140 MMOL/L (ref 136–145)
TRIGL SERPL-MCNC: 40 MG/DL (ref 0–150)
VLDLC SERPL-MCNC: 8 MG/DL (ref 5–40)

## 2023-10-20 PROCEDURE — 80053 COMPREHEN METABOLIC PANEL: CPT | Performed by: INTERNAL MEDICINE

## 2023-10-20 PROCEDURE — 80061 LIPID PANEL: CPT | Performed by: INTERNAL MEDICINE

## 2023-10-20 PROCEDURE — 83036 HEMOGLOBIN GLYCOSYLATED A1C: CPT | Performed by: INTERNAL MEDICINE

## 2023-10-20 PROCEDURE — 36415 COLL VENOUS BLD VENIPUNCTURE: CPT | Performed by: INTERNAL MEDICINE

## 2023-10-24 ENCOUNTER — OFFICE VISIT (OUTPATIENT)
Dept: INTERNAL MEDICINE | Facility: CLINIC | Age: 78
End: 2023-10-24
Payer: MEDICARE

## 2023-10-24 VITALS
HEART RATE: 68 BPM | SYSTOLIC BLOOD PRESSURE: 140 MMHG | DIASTOLIC BLOOD PRESSURE: 70 MMHG | WEIGHT: 148 LBS | BODY MASS INDEX: 29.06 KG/M2 | OXYGEN SATURATION: 99 % | HEIGHT: 60 IN

## 2023-10-24 DIAGNOSIS — R01.1 HEART MURMUR: ICD-10-CM

## 2023-10-24 DIAGNOSIS — K21.9 GASTROESOPHAGEAL REFLUX DISEASE WITHOUT ESOPHAGITIS: ICD-10-CM

## 2023-10-24 DIAGNOSIS — R73.02 IMPAIRED GLUCOSE TOLERANCE: ICD-10-CM

## 2023-10-24 DIAGNOSIS — I10 PRIMARY HYPERTENSION: Primary | ICD-10-CM

## 2023-10-24 DIAGNOSIS — E78.2 MIXED HYPERLIPIDEMIA: ICD-10-CM

## 2023-10-24 PROCEDURE — 1159F MED LIST DOCD IN RCRD: CPT | Performed by: INTERNAL MEDICINE

## 2023-10-24 PROCEDURE — 99214 OFFICE O/P EST MOD 30 MIN: CPT | Performed by: INTERNAL MEDICINE

## 2023-10-24 PROCEDURE — 1160F RVW MEDS BY RX/DR IN RCRD: CPT | Performed by: INTERNAL MEDICINE

## 2023-10-24 PROCEDURE — 3077F SYST BP >= 140 MM HG: CPT | Performed by: INTERNAL MEDICINE

## 2023-10-24 PROCEDURE — 3078F DIAST BP <80 MM HG: CPT | Performed by: INTERNAL MEDICINE

## 2023-10-24 NOTE — PROGRESS NOTES
Patient is a 78 y.o. female who is here for a follow up of hyperlipidemia and hypertension.  Chief Complaint   Patient presents with    Hyperlipidemia    Hypertension         HPI:    Here for mgmt of HTN and hyperlipidemia.  Has issues with pain in hips.  Has completed PT.  No dizziness or lightheadedness.  No HAs.  No abdominal pains.  No LE edema.      History:     Patient Active Problem List   Diagnosis    Gastroesophageal reflux disease without esophagitis    Impaired glucose tolerance    Hyperlipidemia    Hypertension    Osteoporosis    Asteatosis cutis    Cystic lesion of abdominal viscera    Well woman exam    Sigmoid diverticulosis    Heart murmur       Past Medical History:   Diagnosis Date    Arthritis     Hyperlipidemia     Hypertension        Past Surgical History:   Procedure Laterality Date    HYSTERECTOMY  11/1993    and REG    TOTAL ABDOMINAL HYSTERECTOMY         Current Outpatient Medications on File Prior to Visit   Medication Sig    amLODIPine (NORVASC) 5 MG tablet TAKE 1 TABLET DAILY    aspirin 81 MG tablet Take  by mouth daily.    azelastine (ASTELIN) 0.1 % nasal spray azelastine 137 mcg (0.1 %) nasal spray aerosol   U 1 SPR IEN BID    chlorthalidone (HYGROTON) 25 MG tablet TAKE 1 TABLET EVERY MORNING    ibandronate (BONIVA) 150 MG tablet Take 1 tablet by mouth Every 30 (Thirty) Days.    meclizine (ANTIVERT) 25 MG tablet Take 1/2-1 tablet by mouth 3 times daily as needed for dizziness.    multivitamin with minerals tablet tablet Take 1 tablet by mouth Daily.    Omega-3 1000 MG capsule Take  by mouth.    omeprazole (priLOSEC) 40 MG capsule TAKE 1 CAPSULE EVERY MORNING (Patient taking differently: 1 capsule Daily As Needed.)    rosuvastatin (CRESTOR) 5 MG tablet TAKE 1 TABLET EVERY NIGHT   (LABS IN 6 TO 8 WEEKS)    [DISCONTINUED] meloxicam (MOBIC) 15 MG tablet Take 1 tablet by mouth Daily. (Patient not taking: Reported on 10/24/2023)     No current facility-administered medications on file prior  "to visit.       Family History   Problem Relation Age of Onset    Diabetes Other     Kidney disease Other     Hypertension Other        Social History     Socioeconomic History    Marital status:     Number of children: 1    Highest education level: Master's degree (e.g., MA, MS, Cabrera, MEd, MSW, CYN)   Tobacco Use    Smoking status: Never    Smokeless tobacco: Never   Vaping Use    Vaping Use: Never used   Substance and Sexual Activity    Alcohol use: Not Currently    Drug use: Never    Sexual activity: Yes     Partners: Male         Review of Systems   Constitutional:  Negative for chills, diaphoresis, fatigue and fever.   HENT:  Negative for congestion, ear pain, hearing loss, nosebleeds, postnasal drip, rhinorrhea, sinus pressure and sore throat.    Eyes:  Negative for pain, discharge and itching.   Respiratory:  Negative for cough, chest tightness, shortness of breath and wheezing.    Cardiovascular:  Negative for chest pain, palpitations and leg swelling.   Gastrointestinal:  Negative for abdominal distention, abdominal pain, blood in stool, constipation, diarrhea, nausea and vomiting.        1/13 colonoscopy normal  8/22 colonoscopy with 2 polyps   Endocrine: Negative for polydipsia and polyuria.   Genitourinary:  Negative for difficulty urinating, dysuria, frequency and hematuria.        3/22 mammogram and followed by GYN   Musculoskeletal:  Positive for arthralgias. Negative for back pain, gait problem, joint swelling and myalgias.        See HPI   Skin:  Negative for rash and wound.   Neurological:  Positive for numbness (LUE). Negative for syncope, weakness and headaches.   Psychiatric/Behavioral:  Negative for dysphoric mood and sleep disturbance. The patient is not nervous/anxious.        /70   Pulse 68   Ht 152.4 cm (60\")   Wt 67.1 kg (148 lb)   LMP  (LMP Unknown)   SpO2 99%   BMI 28.90 kg/m²       Physical Exam  Constitutional:       Appearance: Normal appearance. She is " well-developed.   HENT:      Head: Normocephalic and atraumatic.      Right Ear: External ear normal.      Left Ear: External ear normal.      Nose: Nose normal.      Mouth/Throat:      Mouth: Mucous membranes are moist.      Pharynx: Oropharynx is clear.   Eyes:      Extraocular Movements: Extraocular movements intact.      Conjunctiva/sclera: Conjunctivae normal.      Pupils: Pupils are equal, round, and reactive to light.   Cardiovascular:      Rate and Rhythm: Normal rate and regular rhythm.      Heart sounds: Murmur (1/6) heard.   Pulmonary:      Effort: Pulmonary effort is normal.      Breath sounds: Normal breath sounds.   Abdominal:      General: Bowel sounds are normal.      Palpations: Abdomen is soft.   Musculoskeletal:         General: Normal range of motion.      Cervical back: Normal range of motion and neck supple.   Lymphadenopathy:      Cervical: No cervical adenopathy.   Skin:     General: Skin is warm and dry.   Neurological:      General: No focal deficit present.      Mental Status: She is alert and oriented to person, place, and time.   Psychiatric:         Mood and Affect: Mood normal.         Behavior: Behavior normal.         Thought Content: Thought content normal.         Procedure:      Discussion/Summary:    htn-cont on CCB and chlorthatlidone, goal of 130/80  hyperlipidemia-labs on Crestor improved  gerd-stable on omeprazole   glucose intolerance-labs improved and advised to watch carbs  osteoporosis-DEXA from 7/20 dw patient, on Boniva  xerosis-lachydrin prn, stable  DJD-tylenol prn and diclofenac gel  Diverticulosis-increase fiber  Heart murmur-noted ECHO  CKD-advised NSAIDs avoidance, adequate hydration, was taking Mobic and advised to stop      recent labs noted and dw patient    Current Outpatient Medications:     amLODIPine (NORVASC) 5 MG tablet, TAKE 1 TABLET DAILY, Disp: 90 tablet, Rfl: 3    aspirin 81 MG tablet, Take  by mouth daily., Disp: , Rfl:     azelastine (ASTELIN) 0.1 %  nasal spray, azelastine 137 mcg (0.1 %) nasal spray aerosol  U 1 SPR IEN BID, Disp: , Rfl:     chlorthalidone (HYGROTON) 25 MG tablet, TAKE 1 TABLET EVERY MORNING, Disp: 90 tablet, Rfl: 3    ibandronate (BONIVA) 150 MG tablet, Take 1 tablet by mouth Every 30 (Thirty) Days., Disp: , Rfl:     meclizine (ANTIVERT) 25 MG tablet, Take 1/2-1 tablet by mouth 3 times daily as needed for dizziness., Disp: 30 tablet, Rfl: 2    multivitamin with minerals tablet tablet, Take 1 tablet by mouth Daily., Disp: , Rfl:     Omega-3 1000 MG capsule, Take  by mouth., Disp: , Rfl:     omeprazole (priLOSEC) 40 MG capsule, TAKE 1 CAPSULE EVERY MORNING (Patient taking differently: 1 capsule Daily As Needed.), Disp: 90 capsule, Rfl: 3    rosuvastatin (CRESTOR) 5 MG tablet, TAKE 1 TABLET EVERY NIGHT   (LABS IN 6 TO 8 WEEKS), Disp: 90 tablet, Rfl: 3        Diagnoses and all orders for this visit:    1. Primary hypertension (Primary)  -     POC Urinalysis Dipstick, Automated  -     Microalbumin / Creatinine Urine Ratio - Urine, Clean Catch    2. Mixed hyperlipidemia    3. Heart murmur    4. Impaired glucose tolerance    5. Gastroesophageal reflux disease without esophagitis

## 2023-10-25 ENCOUNTER — CLINICAL SUPPORT (OUTPATIENT)
Dept: INTERNAL MEDICINE | Facility: CLINIC | Age: 78
End: 2023-10-25
Payer: MEDICARE

## 2023-10-25 DIAGNOSIS — I10 BENIGN HYPERTENSION: Primary | ICD-10-CM

## 2023-10-25 LAB
ALBUMIN UR-MCNC: <1.2 MG/DL
CREAT UR-MCNC: 102.2 MG/DL
MICROALBUMIN/CREAT UR: NORMAL MG/G{CREAT}

## 2023-10-25 PROCEDURE — 82043 UR ALBUMIN QUANTITATIVE: CPT | Performed by: INTERNAL MEDICINE

## 2023-10-25 PROCEDURE — 82570 ASSAY OF URINE CREATININE: CPT | Performed by: INTERNAL MEDICINE

## 2023-10-30 RX ORDER — AMLODIPINE BESYLATE 5 MG/1
TABLET ORAL
Qty: 90 TABLET | Refills: 3 | Status: SHIPPED | OUTPATIENT
Start: 2023-10-30

## 2023-12-04 ENCOUNTER — OFFICE VISIT (OUTPATIENT)
Dept: PAIN MEDICINE | Facility: CLINIC | Age: 78
End: 2023-12-04
Payer: MEDICARE

## 2023-12-04 VITALS
HEART RATE: 64 BPM | TEMPERATURE: 97.1 F | OXYGEN SATURATION: 98 % | SYSTOLIC BLOOD PRESSURE: 136 MMHG | HEIGHT: 60 IN | BODY MASS INDEX: 29.64 KG/M2 | DIASTOLIC BLOOD PRESSURE: 74 MMHG | WEIGHT: 151 LBS

## 2023-12-04 DIAGNOSIS — M54.50 CHRONIC BILATERAL LOW BACK PAIN WITHOUT SCIATICA: ICD-10-CM

## 2023-12-04 DIAGNOSIS — M48.062 SPINAL STENOSIS OF LUMBAR REGION WITH NEUROGENIC CLAUDICATION: Primary | ICD-10-CM

## 2023-12-04 DIAGNOSIS — G89.29 CHRONIC BILATERAL LOW BACK PAIN WITHOUT SCIATICA: ICD-10-CM

## 2023-12-04 PROCEDURE — 1160F RVW MEDS BY RX/DR IN RCRD: CPT | Performed by: STUDENT IN AN ORGANIZED HEALTH CARE EDUCATION/TRAINING PROGRAM

## 2023-12-04 PROCEDURE — 3075F SYST BP GE 130 - 139MM HG: CPT | Performed by: STUDENT IN AN ORGANIZED HEALTH CARE EDUCATION/TRAINING PROGRAM

## 2023-12-04 PROCEDURE — 1125F AMNT PAIN NOTED PAIN PRSNT: CPT | Performed by: STUDENT IN AN ORGANIZED HEALTH CARE EDUCATION/TRAINING PROGRAM

## 2023-12-04 PROCEDURE — 3078F DIAST BP <80 MM HG: CPT | Performed by: STUDENT IN AN ORGANIZED HEALTH CARE EDUCATION/TRAINING PROGRAM

## 2023-12-04 PROCEDURE — 1159F MED LIST DOCD IN RCRD: CPT | Performed by: STUDENT IN AN ORGANIZED HEALTH CARE EDUCATION/TRAINING PROGRAM

## 2023-12-04 PROCEDURE — 99214 OFFICE O/P EST MOD 30 MIN: CPT | Performed by: STUDENT IN AN ORGANIZED HEALTH CARE EDUCATION/TRAINING PROGRAM

## 2023-12-04 RX ORDER — GABAPENTIN 100 MG/1
100 CAPSULE ORAL 2 TIMES DAILY
Qty: 60 CAPSULE | Refills: 2 | Status: SHIPPED | OUTPATIENT
Start: 2023-12-04

## 2023-12-04 NOTE — PROGRESS NOTES
Referring Physician: No referring provider defined for this encounter.    Primary Physician: Edison Herring MD    CHIEF COMPLAINT or REASON FOR VISIT: Follow-up and Back Pain (6 week follow up)      Initial history of present illness on 08/16/2023:  Ms. Marleni Armando is 78 y.o. female who presents as a new patient referral for evaluation treatment bilateral buttock pain.  Ms. Armando states that this has been ongoing but worsening without any specific inciting event or trauma over the last several months.  She describes achy bilateral buttock pain worst in the morning.  She does not particular associate this with ambulation but does have a relationship with standing.  She denies any lower extremity radiation or low back pain.  Patient denies any bowel or bladder dysfunction, lower extremity weakness, new onset saddle anesthesia or unexplained weight loss.     She has been engaged in physical therapy since March 2023 with some improvement.  She was evaluated with bilateral hip x-rays and bilateral hip MRI as well as consultation with orthopedic surgery, Dr. Calderon, who felt that her etiology was likely neuraxial.  She is also under treatment at [facility] where she has had lumbar x-ray demonstrating multilevel spondylosis and concern for diffuse idiopathic skeletal hyperostosis (DISH).  She has not had any spine surgeries or interventions.  She describes frequent muscle cramping in her buttocks and proximal posterior thighs.  Baclofen caused nausea.    Interval history: Patient returns to clinic after undergoing lumbar epidural steroid injection.  Unfortunately this provided no relief.  She has some questions about PRP.    Interventions:  10/19/2023: L2/3 LESI with no relief    Objective Pain Scoring:   BRIEF PAIN INVENTORY:  Total score:   Pain Score    12/04/23 1029   PainSc:   3   PainLoc: Back      PHQ-2: PHQ-2 Total Score: 0  PHQ-9: PHQ-9: Brief Depression Severity Measure Score:  0  Opioid Risk Tool:         Review of Systems:   ROS negative except as otherwise noted     Past Medical History:   Past Medical History:   Diagnosis Date    Arthritis     Hyperlipidemia     Hypertension          Past Surgical History:   Past Surgical History:   Procedure Laterality Date    HYSTERECTOMY  11/1993    and REG    TOTAL ABDOMINAL HYSTERECTOMY           Family History   Family History   Problem Relation Age of Onset    Diabetes Other     Kidney disease Other     Hypertension Other          Social History   Social History     Socioeconomic History    Marital status:     Number of children: 1    Highest education level: Master's degree (e.g., MA, MS, Cabrera, MEd, MSW, CYN)   Tobacco Use    Smoking status: Never    Smokeless tobacco: Never   Vaping Use    Vaping Use: Never used   Substance and Sexual Activity    Alcohol use: Not Currently    Drug use: Never    Sexual activity: Yes     Partners: Male        Medications:     Current Outpatient Medications:     amLODIPine (NORVASC) 5 MG tablet, TAKE 1 TABLET DAILY, Disp: 90 tablet, Rfl: 3    aspirin 81 MG tablet, Take  by mouth daily., Disp: , Rfl:     azelastine (ASTELIN) 0.1 % nasal spray, azelastine 137 mcg (0.1 %) nasal spray aerosol  U 1 SPR IEN BID, Disp: , Rfl:     chlorthalidone (HYGROTON) 25 MG tablet, TAKE 1 TABLET EVERY MORNING, Disp: 90 tablet, Rfl: 3    ibandronate (BONIVA) 150 MG tablet, Take 1 tablet by mouth Every 30 (Thirty) Days., Disp: , Rfl:     meclizine (ANTIVERT) 25 MG tablet, Take 1/2-1 tablet by mouth 3 times daily as needed for dizziness., Disp: 30 tablet, Rfl: 2    multivitamin with minerals tablet tablet, Take 1 tablet by mouth Daily., Disp: , Rfl:     Omega-3 1000 MG capsule, Take  by mouth., Disp: , Rfl:     omeprazole (priLOSEC) 40 MG capsule, TAKE 1 CAPSULE EVERY MORNING (Patient taking differently: 1 capsule Daily As Needed.), Disp: 90 capsule, Rfl: 3    rosuvastatin (CRESTOR) 5 MG tablet, TAKE 1 TABLET EVERY NIGHT   (LABS  "IN 6 TO 8 WEEKS), Disp: 90 tablet, Rfl: 3        Physical Exam:     Vitals:    12/04/23 1029   BP: 136/74   Pulse: 64   Temp: 97.1 °F (36.2 °C)   TempSrc: Infrared   SpO2: 98%   Weight: 68.5 kg (151 lb)   Height: 152.4 cm (60\")   PainSc:   3   PainLoc: Back        General: Alert and oriented, No acute distress.   HEENT: Normocephalic, atraumatic.   Cardiovascular: No gross edema  Respiratory: Respirations are non-labored    Cervical Spine:   No masses or atrophy  Range of motion - Flexion normal. Extension normal. Lateral rotation normal.   Palpation - nontender   Spurling's - negative     Thoracic Spine:   Inspection: no gross abnormality  Paraspinal muscle palpation: nontender  Spinous process palpation: nontender    Lumbar Spine:   No masses or atrophy  Range of motion - Flexion normal. Extension normal. Right Lat Bending normal. Left Lat Bending normal  Facet Loading: Negative bilaterally  Facet Palpation - Nontender   PSIS tenderness - Negative bilaterally  Tony's/OSVALDO/Thigh thrust - Negative bilaterally  Straight leg raise: Negative bilaterally  Slump test: Negative bilaterally    No tenderness with palpation of bilateral ischial bursae  No tenderness with palpation of bilateral greater trochanters  Negative hip scours bilaterally  Motor Exam:    Strength: Rate on 1-5 scale Right Left    C5-Elbow flexion, Deltoid 5 5    C6-Wrist extension 5 5    C7- Elbow / finger extension 5 5    C8- Finger flexion 5 5    T1- Intrinsics hand 5 5    Strength: Rate on 1-5 scale Right Left    L1/2- hip flexion 5 5    L3- knee extension 5 5    L4- ankle dorsiflexion 5 5    L5- great toe extension 5 5    S1- ankle plantarflexion 5 5    Sensory Exam: Full and equal sensation to light touch throughout.    Neurologic: Cranial Nerves II-XII are grossly intact.   Psychiatric: Cooperative.   Gait: Normal   Assistive Devices: None    Imaging Studies:   No results found for this or any previous visit.      Impression & Plan: "   08/16/2023: Marleni Armando is a 78 y.o. female with past medical history significant for GERD, HLD, HTN who presents to the pain clinic for evaluation and treatment of bilateral buttock pain.  On physical exam no provocative maneuvers are able to reproduce her pain.  Clinical examination consistent with claudication versus facet spondylosis.  Will obtain lumbar MRI to further evaluate.  10/2/23: I personally reviewed and interpreted her lumbar MRI dated September 21, 2023 which demonstrates grade 1 anterolisthesis of L3 on L4 with severe canal stenosis.  Lumbar x-ray did not show any dynamic listhesis.  We discussed epidural steroid injection to improve pain.  If greater than 50% relief for at least 2-3 months can consider repeat as needed every 3 to 4 months.  I had an in-depth discussion with the patient regarding the risks of the procedure including bleeding, infection, damage to surrounding structures, paralysis.  We discussed the potential adverse effects of corticosteroid injection including flushing of the face, lipodystrophy, skin discoloration, elevated blood glucose, increased blood pressure.  Risks of frequent steroid administration include weight gain, hormonal changes, mood changes, osteoporosis.  12/4/2023: Discussed PRP primarily as a therapy for poorly vascular soft tissue injury (meniscus, cartilage, tendon/ligament, disc).  Will refer to NSA for eval.    1. Spinal stenosis of lumbar region with neurogenic claudication    2. Chronic bilateral low back pain without sciatica            PLAN:  1. Medication Recommendations: Recommend Voltaren topical, NSAIDs, Tylenol.  Can trial turmeric 500 mg twice daily if NSAID contraindicated.  Start gabapentin 100 mg twice daily #2 refills given.  As part of this patient's treatment plan, patient will be prescribed controlled substances. The patient has been made aware of appropriate use of such medications, including potential risk of somnolence, limited  ability to drive and /or work safely, and potential for dependence or overdose. It has also been made clear that these medications are for use by this patient only, without concomitant use of alcohol or other substances unless prescribed.Controlled substance status of medication discussed with patient, discussed risks of medication including abuse potential and diversion potential and need to follow up for reevaluation appointment in order to receive further refills.  Rodrick was reviewed and compliant.    2. Physical Therapy: Continue HEP    3. Psychological: defer    4. Complementary and alternative (CAM) Therapies:     5. Labs: None indicated     6. Imaging: N/A    7. Interventions: N/A    8. Referrals: Referral to Dr. Walden    9. Records requested: None    10. Lifestyle goals:    Follow-up 3 months      [facility]  Yefri Sheikh MD

## 2023-12-15 ENCOUNTER — OFFICE VISIT (OUTPATIENT)
Dept: NEUROSURGERY | Facility: CLINIC | Age: 78
End: 2023-12-15
Payer: MEDICARE

## 2023-12-15 VITALS — HEIGHT: 61 IN | WEIGHT: 152 LBS | BODY MASS INDEX: 28.7 KG/M2 | TEMPERATURE: 97.1 F

## 2023-12-15 DIAGNOSIS — M48.062 SPINAL STENOSIS, LUMBAR REGION, WITH NEUROGENIC CLAUDICATION: Primary | ICD-10-CM

## 2023-12-15 PROCEDURE — 1159F MED LIST DOCD IN RCRD: CPT | Performed by: NEUROLOGICAL SURGERY

## 2023-12-15 PROCEDURE — 1160F RVW MEDS BY RX/DR IN RCRD: CPT | Performed by: NEUROLOGICAL SURGERY

## 2023-12-15 PROCEDURE — 99204 OFFICE O/P NEW MOD 45 MIN: CPT | Performed by: NEUROLOGICAL SURGERY

## 2023-12-15 NOTE — PROGRESS NOTES
Patient: Marleni Armando  : 1945    Primary Care Provider: Edison Herring MD    Requesting Provider: As above        History    Chief Complaint: Low back with pain in the lower extremities.    History of Present Illness: Ms. Armando is a 78-year-old retired  who describes a 1 year history of progressive stiffness in her back.  More bothersome is pain in her buttocks that extends into the thighs.  Her symptoms are worse with standing, walking, sitting too long.  She has some mild symptoms when lying down.  She has no numbness or weakness.  She has done chiropractic.  She began physical therapy in March and has just finished up with that.  She has been on gabapentin for about a week.  Meloxicam was discontinued given some renal issues.  She had an epidural injection performed in October and that has not helped.    Review of Systems   Constitutional:  Negative for activity change, appetite change, chills, diaphoresis, fatigue, fever and unexpected weight change.   HENT:  Negative for congestion, dental problem, drooling, ear discharge, ear pain, facial swelling, hearing loss, mouth sores, nosebleeds, postnasal drip, rhinorrhea, sinus pressure, sinus pain, sneezing, sore throat, tinnitus, trouble swallowing and voice change.    Eyes:  Negative for photophobia, pain, discharge, redness, itching and visual disturbance.   Respiratory:  Negative for apnea, cough, choking, chest tightness, shortness of breath, wheezing and stridor.    Cardiovascular:  Negative for chest pain, palpitations and leg swelling.   Gastrointestinal:  Negative for abdominal distention, abdominal pain, anal bleeding, blood in stool, constipation, diarrhea, nausea, rectal pain and vomiting.   Endocrine: Negative for cold intolerance, heat intolerance, polydipsia, polyphagia and polyuria.   Genitourinary:  Negative for decreased urine volume, difficulty urinating, dysuria, enuresis, flank pain, frequency, genital  "sores, hematuria and urgency.   Musculoskeletal:  Positive for back pain. Negative for arthralgias, gait problem, joint swelling, myalgias, neck pain and neck stiffness.   Skin:  Negative for color change, pallor, rash and wound.   Allergic/Immunologic: Negative for environmental allergies, food allergies and immunocompromised state.   Neurological:  Negative for dizziness, tremors, seizures, syncope, facial asymmetry, speech difficulty, weakness, light-headedness, numbness and headaches.   Hematological:  Negative for adenopathy. Does not bruise/bleed easily.   Psychiatric/Behavioral:  Negative for agitation, behavioral problems, confusion, decreased concentration, dysphoric mood, hallucinations, self-injury, sleep disturbance and suicidal ideas. The patient is not nervous/anxious and is not hyperactive.        The patient's past medical history, past surgical history, family history, and social history have been reviewed at length in the electronic medical record.      Physical Exam:   Temp 97.1 °F (36.2 °C) (Infrared)   Ht 154.9 cm (61\")   Wt 68.9 kg (152 lb)   LMP  (LMP Unknown)   BMI 28.72 kg/m²   CONSTITUTIONAL: Patient is well-nourished, pleasant and appears stated age.  MUSCULOSKELETAL:  Straight leg raising is negative.  Tony's Sign is negative.  ROM in the low back is normal.  Tenderness in the back to palpation is not observed.  NEUROLOGICAL:  Orientation, memory, attention span, language function, and cognition have been examined and are intact.  Strength is intact in the lower extremities to direct testing.  Muscle tone is normal throughout.  Station and gait are normal.  Sensation is intact to light touch testing throughout.  Deep tendon reflexes are 1+ and symmetrical.  Coordination is intact.      Medical Decision Making    Data Review:   (All imaging studies were personally reviewed unless stated otherwise)  MRI of the lumbar spine dated 9/21/2023 demonstrates a grade 1 listhesis of L3 on L4 " and L4 on L5.  There is high-grade stenosis at L3-4 and more moderate stenosis at L4-5.  Bilateral joint effusions are noted at L3-4.  I do not see a lot of movement on flexion-extension views but the offset particularly at L4-5 is more prominent on the upright imaging as opposed to on the recumbent MRI images.    Diagnosis:   1.  Lumbar stenosis with neurogenic claudication.  2.  Lumbar spondylolisthesis with some instability.    Treatment Options:   I have discussed these findings with the patient.  Frankly, she has exhausted nonoperative measures.  If her symptoms are pronounced then I would consider decompression with fusion and stabilization L3-5.  I have gone over what that would entail as well as the potential risks, complications, limitations.  She seems to be a bit taken aback and surprised by my discussion.  She is going to ponder on things and follow-up with a friend or family member to help her process everything.      Scribed for Gokul Walden MD by Gokul Walden MD on 12/15/2023 13:58 EST      I, Dr. Walden, personally performed the services described in the documentation, as scribed in my presence, and it is both accurate and complete.

## 2024-01-02 ENCOUNTER — TELEPHONE (OUTPATIENT)
Dept: PAIN MEDICINE | Facility: CLINIC | Age: 79
End: 2024-01-02
Payer: MEDICARE

## 2024-01-02 NOTE — TELEPHONE ENCOUNTER
Patient called and wanted to know if she could get a copy of her recent MRI. I advised her to call the location where the MRI was performed.

## 2024-01-31 ENCOUNTER — OFFICE VISIT (OUTPATIENT)
Dept: INTERNAL MEDICINE | Facility: CLINIC | Age: 79
End: 2024-01-31
Payer: MEDICARE

## 2024-01-31 ENCOUNTER — LAB (OUTPATIENT)
Dept: INTERNAL MEDICINE | Facility: CLINIC | Age: 79
End: 2024-01-31
Payer: MEDICARE

## 2024-01-31 VITALS
WEIGHT: 151 LBS | BODY MASS INDEX: 28.51 KG/M2 | SYSTOLIC BLOOD PRESSURE: 130 MMHG | OXYGEN SATURATION: 98 % | DIASTOLIC BLOOD PRESSURE: 70 MMHG | HEART RATE: 58 BPM | HEIGHT: 61 IN

## 2024-01-31 DIAGNOSIS — R73.02 IMPAIRED GLUCOSE TOLERANCE: ICD-10-CM

## 2024-01-31 DIAGNOSIS — K57.30 SIGMOID DIVERTICULOSIS: ICD-10-CM

## 2024-01-31 DIAGNOSIS — I10 PRIMARY HYPERTENSION: Primary | ICD-10-CM

## 2024-01-31 DIAGNOSIS — I10 PRIMARY HYPERTENSION: ICD-10-CM

## 2024-01-31 DIAGNOSIS — E78.2 MIXED HYPERLIPIDEMIA: Primary | ICD-10-CM

## 2024-01-31 DIAGNOSIS — K21.9 GASTROESOPHAGEAL REFLUX DISEASE WITHOUT ESOPHAGITIS: ICD-10-CM

## 2024-01-31 LAB
ALBUMIN SERPL-MCNC: 4.4 G/DL (ref 3.5–5.2)
ALBUMIN/GLOB SERPL: 1.5 G/DL
ALP SERPL-CCNC: 53 U/L (ref 39–117)
ALT SERPL W P-5'-P-CCNC: 19 U/L (ref 1–33)
ANION GAP SERPL CALCULATED.3IONS-SCNC: 13.8 MMOL/L (ref 5–15)
AST SERPL-CCNC: 22 U/L (ref 1–32)
BILIRUB SERPL-MCNC: 0.3 MG/DL (ref 0–1.2)
BUN SERPL-MCNC: 15 MG/DL (ref 8–23)
BUN/CREAT SERPL: 13.9 (ref 7–25)
CALCIUM SPEC-SCNC: 9.7 MG/DL (ref 8.6–10.5)
CHLORIDE SERPL-SCNC: 98 MMOL/L (ref 98–107)
CHOLEST SERPL-MCNC: 180 MG/DL (ref 0–200)
CO2 SERPL-SCNC: 24.2 MMOL/L (ref 22–29)
CREAT SERPL-MCNC: 1.08 MG/DL (ref 0.57–1)
DEPRECATED RDW RBC AUTO: 44.6 FL (ref 37–54)
EGFRCR SERPLBLD CKD-EPI 2021: 53.3 ML/MIN/1.73
ERYTHROCYTE [DISTWIDTH] IN BLOOD BY AUTOMATED COUNT: 16.6 % (ref 12.3–15.4)
GLOBULIN UR ELPH-MCNC: 2.9 GM/DL
GLUCOSE SERPL-MCNC: 73 MG/DL (ref 65–99)
HBA1C MFR BLD: 6.9 % (ref 4.8–5.6)
HCT VFR BLD AUTO: 37.9 % (ref 34–46.6)
HDLC SERPL-MCNC: 55 MG/DL (ref 40–60)
HGB BLD-MCNC: 12.8 G/DL (ref 12–15.9)
LDLC SERPL CALC-MCNC: 113 MG/DL (ref 0–100)
LDLC/HDLC SERPL: 2.05 {RATIO}
MCH RBC QN AUTO: 25.6 PG (ref 26.6–33)
MCHC RBC AUTO-ENTMCNC: 33.8 G/DL (ref 31.5–35.7)
MCV RBC AUTO: 75.8 FL (ref 79–97)
PLATELET # BLD AUTO: 382 10*3/MM3 (ref 140–450)
PMV BLD AUTO: 8.8 FL (ref 6–12)
POTASSIUM SERPL-SCNC: 3.2 MMOL/L (ref 3.5–5.2)
PROT SERPL-MCNC: 7.3 G/DL (ref 6–8.5)
RBC # BLD AUTO: 5 10*6/MM3 (ref 3.77–5.28)
SODIUM SERPL-SCNC: 136 MMOL/L (ref 136–145)
TRIGL SERPL-MCNC: 62 MG/DL (ref 0–150)
VLDLC SERPL-MCNC: 12 MG/DL (ref 5–40)
WBC NRBC COR # BLD AUTO: 6.97 10*3/MM3 (ref 3.4–10.8)

## 2024-01-31 PROCEDURE — 83036 HEMOGLOBIN GLYCOSYLATED A1C: CPT | Performed by: INTERNAL MEDICINE

## 2024-01-31 PROCEDURE — 80061 LIPID PANEL: CPT | Performed by: INTERNAL MEDICINE

## 2024-01-31 PROCEDURE — 99214 OFFICE O/P EST MOD 30 MIN: CPT | Performed by: INTERNAL MEDICINE

## 2024-01-31 PROCEDURE — 80053 COMPREHEN METABOLIC PANEL: CPT | Performed by: INTERNAL MEDICINE

## 2024-01-31 PROCEDURE — 85027 COMPLETE CBC AUTOMATED: CPT | Performed by: INTERNAL MEDICINE

## 2024-01-31 PROCEDURE — 36415 COLL VENOUS BLD VENIPUNCTURE: CPT | Performed by: INTERNAL MEDICINE

## 2024-01-31 NOTE — PROGRESS NOTES
Patient is a 78 y.o. female who is here for a follow up of hyperlipidemia and hypertension.  Chief Complaint   Patient presents with    Hyperlipidemia    Hypertension         HPI:    Here for mgmt of HTN and hyperlipidemia.  Recently seen by NS for spinal stenosis.  Placed on gabapentin with some improvement.  A lot of stressors in life.  BP has been good.  No dizziness or lightheadedness.  No abdominal pains.      History:     Patient Active Problem List   Diagnosis    Gastroesophageal reflux disease without esophagitis    Impaired glucose tolerance    Hyperlipidemia    Hypertension    Osteoporosis    Asteatosis cutis    Cystic lesion of abdominal viscera    Well woman exam    Sigmoid diverticulosis    Heart murmur       Past Medical History:   Diagnosis Date    Arthritis     Hyperlipidemia     Hypertension        Past Surgical History:   Procedure Laterality Date    HYSTERECTOMY  11/1993    and REG    TOTAL ABDOMINAL HYSTERECTOMY         Current Outpatient Medications on File Prior to Visit   Medication Sig    amLODIPine (NORVASC) 5 MG tablet TAKE 1 TABLET DAILY    aspirin 81 MG tablet Take  by mouth daily.    azelastine (ASTELIN) 0.1 % nasal spray azelastine 137 mcg (0.1 %) nasal spray aerosol   U 1 SPR IEN BID    gabapentin (Neurontin) 100 MG capsule Take 1 capsule by mouth 2 (Two) Times a Day.    ibandronate (BONIVA) 150 MG tablet Take 1 tablet by mouth Every 30 (Thirty) Days.    meclizine (ANTIVERT) 25 MG tablet Take 1/2-1 tablet by mouth 3 times daily as needed for dizziness.    multivitamin with minerals tablet tablet Take 1 tablet by mouth Daily.    Omega-3 1000 MG capsule Take  by mouth.    omeprazole (priLOSEC) 40 MG capsule TAKE 1 CAPSULE EVERY MORNING (Patient taking differently: 1 capsule Daily As Needed.)    rosuvastatin (CRESTOR) 5 MG tablet TAKE 1 TABLET EVERY NIGHT   (LABS IN 6 TO 8 WEEKS)    [DISCONTINUED] chlorthalidone (HYGROTON) 25 MG tablet TAKE 1 TABLET EVERY MORNING     No current  "facility-administered medications on file prior to visit.       Family History   Problem Relation Age of Onset    Diabetes Other     Kidney disease Other     Hypertension Other        Social History     Socioeconomic History    Marital status:     Number of children: 1    Highest education level: Master's degree (e.g., MA, MS, Cabrera, MEd, MSW, CYN)   Tobacco Use    Smoking status: Never    Smokeless tobacco: Never   Vaping Use    Vaping Use: Never used   Substance and Sexual Activity    Alcohol use: Not Currently    Drug use: Never    Sexual activity: Yes     Partners: Male         Review of Systems   Constitutional:  Negative for chills, diaphoresis, fatigue and fever.   HENT:  Negative for congestion, ear pain, hearing loss, nosebleeds, postnasal drip, rhinorrhea, sinus pressure and sore throat.    Eyes:  Negative for pain, discharge and itching.   Respiratory:  Negative for cough, chest tightness, shortness of breath and wheezing.    Cardiovascular:  Negative for chest pain, palpitations and leg swelling.   Gastrointestinal:  Negative for abdominal distention, abdominal pain, blood in stool, constipation, diarrhea, nausea and vomiting.        1/13 colonoscopy normal  8/22 colonoscopy with 2 polyps   Endocrine: Negative for polydipsia and polyuria.   Genitourinary:  Negative for difficulty urinating, dysuria, frequency and hematuria.        3/23 mammogram and followed by GYN   Musculoskeletal:  Positive for arthralgias and back pain. Negative for gait problem, joint swelling and myalgias.        See HPI   Skin:  Negative for rash and wound.   Neurological:  Positive for numbness (LUE). Negative for syncope, weakness and headaches.   Psychiatric/Behavioral:  Negative for dysphoric mood and sleep disturbance. The patient is not nervous/anxious.        /70   Pulse 58   Ht 154.9 cm (60.98\")   Wt 68.5 kg (151 lb)   LMP  (LMP Unknown)   SpO2 98%   BMI 28.55 kg/m²       Physical Exam  Constitutional:  "      Appearance: Normal appearance. She is well-developed.   HENT:      Head: Normocephalic and atraumatic.      Right Ear: External ear normal.      Left Ear: External ear normal.      Nose: Nose normal.      Mouth/Throat:      Mouth: Mucous membranes are moist.      Pharynx: Oropharynx is clear.   Eyes:      Extraocular Movements: Extraocular movements intact.      Conjunctiva/sclera: Conjunctivae normal.      Pupils: Pupils are equal, round, and reactive to light.   Cardiovascular:      Rate and Rhythm: Normal rate and regular rhythm.      Heart sounds: Murmur (1/6) heard.   Pulmonary:      Effort: Pulmonary effort is normal.      Breath sounds: Normal breath sounds.   Abdominal:      General: Bowel sounds are normal.      Palpations: Abdomen is soft.   Musculoskeletal:         General: Normal range of motion.      Cervical back: Normal range of motion and neck supple.   Lymphadenopathy:      Cervical: No cervical adenopathy.   Skin:     General: Skin is warm and dry.   Neurological:      General: No focal deficit present.      Mental Status: She is alert and oriented to person, place, and time.   Psychiatric:         Mood and Affect: Mood normal.         Behavior: Behavior normal.         Thought Content: Thought content normal.         Procedure:      Discussion/Summary:    htn-cont on CCB and chlorthatlidone, goal of 130/80, will change to Maxzide sec to low K  hyperlipidemia-labs on Crestor , not at goal, counseled on diet  gerd-stable on omeprazole   glucose intolerance-labs improved and advised to watch carbs, AIC today slightly worsened  osteoporosis-DEXA from 7/20 dw patient, on Boniva  xerosis-lachydrin prn, stable  DJD-tylenol prn and diclofenac gel  Diverticulosis-increase fiber  Heart murmur-noted ECHO  CKD-advised NSAIDs avoidance, adequate hydration, labs today stable      recent labs noted and dw patient, recheck BMP in 1-2 weeks    Current Outpatient Medications:     amLODIPine (NORVASC) 5 MG  tablet, TAKE 1 TABLET DAILY, Disp: 90 tablet, Rfl: 3    aspirin 81 MG tablet, Take  by mouth daily., Disp: , Rfl:     azelastine (ASTELIN) 0.1 % nasal spray, azelastine 137 mcg (0.1 %) nasal spray aerosol  U 1 SPR IEN BID, Disp: , Rfl:     gabapentin (Neurontin) 100 MG capsule, Take 1 capsule by mouth 2 (Two) Times a Day., Disp: 60 capsule, Rfl: 2    ibandronate (BONIVA) 150 MG tablet, Take 1 tablet by mouth Every 30 (Thirty) Days., Disp: , Rfl:     meclizine (ANTIVERT) 25 MG tablet, Take 1/2-1 tablet by mouth 3 times daily as needed for dizziness., Disp: 30 tablet, Rfl: 2    multivitamin with minerals tablet tablet, Take 1 tablet by mouth Daily., Disp: , Rfl:     Omega-3 1000 MG capsule, Take  by mouth., Disp: , Rfl:     omeprazole (priLOSEC) 40 MG capsule, TAKE 1 CAPSULE EVERY MORNING (Patient taking differently: 1 capsule Daily As Needed.), Disp: 90 capsule, Rfl: 3    rosuvastatin (CRESTOR) 5 MG tablet, TAKE 1 TABLET EVERY NIGHT   (LABS IN 6 TO 8 WEEKS), Disp: 90 tablet, Rfl: 3    triamterene-hydrochlorothiazide (Maxzide-25) 37.5-25 MG per tablet, Take 1 tablet by mouth Every Morning. Repeat potassium in 1-2 weeks, Disp: 90 tablet, Rfl: 3        Diagnoses and all orders for this visit:    1. Mixed hyperlipidemia (Primary)  -     Comprehensive Metabolic Panel  -     Lipid Panel    2. Primary hypertension  -     CBC (No Diff)  -     triamterene-hydrochlorothiazide (Maxzide-25) 37.5-25 MG per tablet; Take 1 tablet by mouth Every Morning. Repeat potassium in 1-2 weeks  Dispense: 90 tablet; Refill: 3  -     Basic Metabolic Panel; Future    3. Impaired glucose tolerance  -     Hemoglobin A1c    4. Gastroesophageal reflux disease without esophagitis    5. Sigmoid diverticulosis

## 2024-02-01 RX ORDER — TRIAMTERENE AND HYDROCHLOROTHIAZIDE 37.5; 25 MG/1; MG/1
1 TABLET ORAL EVERY MORNING
Qty: 90 TABLET | Refills: 3 | Status: SHIPPED | OUTPATIENT
Start: 2024-02-01 | End: 2024-02-02 | Stop reason: SDUPTHER

## 2024-02-02 DIAGNOSIS — I10 PRIMARY HYPERTENSION: ICD-10-CM

## 2024-02-02 RX ORDER — TRIAMTERENE AND HYDROCHLOROTHIAZIDE 37.5; 25 MG/1; MG/1
1 TABLET ORAL EVERY MORNING
Qty: 90 TABLET | Refills: 3 | Status: SHIPPED | OUTPATIENT
Start: 2024-02-02

## 2024-02-09 ENCOUNTER — OFFICE VISIT (OUTPATIENT)
Dept: NEUROSURGERY | Facility: CLINIC | Age: 79
End: 2024-02-09
Payer: MEDICARE

## 2024-02-09 VITALS — OXYGEN SATURATION: 98 % | WEIGHT: 150 LBS | HEART RATE: 78 BPM | BODY MASS INDEX: 28.32 KG/M2 | HEIGHT: 61 IN

## 2024-02-09 DIAGNOSIS — M48.062 SPINAL STENOSIS, LUMBAR REGION, WITH NEUROGENIC CLAUDICATION: Primary | ICD-10-CM

## 2024-02-09 DIAGNOSIS — M43.16 SPONDYLOLISTHESIS OF LUMBAR REGION: ICD-10-CM

## 2024-02-09 PROCEDURE — 99213 OFFICE O/P EST LOW 20 MIN: CPT | Performed by: NEUROLOGICAL SURGERY

## 2024-02-09 PROCEDURE — 1159F MED LIST DOCD IN RCRD: CPT | Performed by: NEUROLOGICAL SURGERY

## 2024-02-09 PROCEDURE — 1160F RVW MEDS BY RX/DR IN RCRD: CPT | Performed by: NEUROLOGICAL SURGERY

## 2024-02-09 NOTE — PROGRESS NOTES
Patient: Marleni Armando  : 1945    Primary Care Provider: Edison Herring MD    Requesting Provider: As above        History    Chief Complaint: Low back and lower extremity pain.    History of Present Illness: Ms. Armando is a 78-year-old retired  who describes a 1 year history of progressive stiffness in her back.  More bothersome is pain in her buttocks that extends into the thighs.  Her symptoms are worse with standing, walking, sitting too long.  She has some mild symptoms when lying down.  She has no numbness or weakness.  She has done chiropractic.  She began physical therapy in March and has just finished up with that.  She has been on gabapentin for about a week.  Meloxicam was discontinued given some renal issues.  She had an epidural injection performed in October and that has not helped.  She reports no change in her symptoms.  She is here with her sister today.    Review of Systems   Constitutional:  Negative for activity change, appetite change, chills, diaphoresis, fatigue, fever and unexpected weight change.   HENT:  Negative for congestion, dental problem, drooling, ear discharge, ear pain, facial swelling, hearing loss, mouth sores, nosebleeds, postnasal drip, rhinorrhea, sinus pressure, sinus pain, sneezing, sore throat, tinnitus, trouble swallowing and voice change.    Eyes:  Negative for photophobia, pain, discharge, redness, itching and visual disturbance.   Respiratory:  Negative for apnea, cough, choking, chest tightness, shortness of breath, wheezing and stridor.    Cardiovascular:  Negative for chest pain, palpitations and leg swelling.   Gastrointestinal:  Negative for abdominal distention, abdominal pain, anal bleeding, blood in stool, constipation, diarrhea, nausea, rectal pain and vomiting.   Endocrine: Negative for cold intolerance, heat intolerance, polydipsia, polyphagia and polyuria.   Genitourinary:  Negative for decreased urine volume,  "difficulty urinating, dysuria, enuresis, flank pain, frequency, genital sores, hematuria and urgency.   Musculoskeletal:  Positive for back pain. Negative for arthralgias, gait problem, joint swelling, myalgias, neck pain and neck stiffness.   Skin:  Negative for color change, pallor, rash and wound.   Allergic/Immunologic: Negative for environmental allergies, food allergies and immunocompromised state.   Neurological:  Negative for dizziness, tremors, seizures, syncope, facial asymmetry, speech difficulty, weakness, light-headedness, numbness and headaches.   Hematological:  Negative for adenopathy. Does not bruise/bleed easily.   Psychiatric/Behavioral:  Negative for agitation, behavioral problems, confusion, decreased concentration, dysphoric mood, hallucinations, self-injury, sleep disturbance and suicidal ideas. The patient is not nervous/anxious and is not hyperactive.      The patient's past medical history, past surgical history, family history, and social history have been reviewed at length in the electronic medical record.      Physical Exam:   Pulse 78   Ht 154.9 cm (61\")   Wt 68 kg (150 lb)   LMP  (LMP Unknown)   SpO2 98%   BMI 28.34 kg/m²   Deferred    Medical Decision Making    Data Review:   (All imaging studies were personally reviewed unless stated otherwise)  MRI of the lumbar spine dated 9/21/2023 demonstrates a grade 1 listhesis of L3 on L4 and L4 on L5.  There is high-grade stenosis at L3-4 and more moderate stenosis at L4-5.  Bilateral joint effusions are noted at L3-4.  I do not see a lot of movement on flexion-extension views but the offset particularly at L4-5 is more prominent on the upright imaging as opposed to on the recumbent MRI images.     Diagnosis:   1.  Lumbar stenosis with neurogenic claudication.  2.  Lumbar spondylolisthesis with instability.    Treatment Options:   I have recommended lumbar decompression with fusion and stabilization from L3-5.  The nature of the " procedure as well as the potential risks, complications, limitations, and alternatives to the procedure were discussed at length with the patient and her sister.  I have answered extensive questions.  The patient and her sister are going to talk things over.  If she would like to pursue surgical intervention then she will contact my office.      Scribed for Gokul Walden MD by Rochelle Cast CMA on 2/9/2024 11:58 EST       I, Dr. Walden, personally performed the services described in the documentation, as scribed in my presence, and it is both accurate and complete.

## 2024-02-23 ENCOUNTER — LAB (OUTPATIENT)
Dept: INTERNAL MEDICINE | Facility: CLINIC | Age: 79
End: 2024-02-23
Payer: MEDICARE

## 2024-02-23 DIAGNOSIS — I10 PRIMARY HYPERTENSION: ICD-10-CM

## 2024-02-23 PROCEDURE — 80048 BASIC METABOLIC PNL TOTAL CA: CPT | Performed by: INTERNAL MEDICINE

## 2024-02-24 LAB
ANION GAP SERPL CALCULATED.3IONS-SCNC: 11.8 MMOL/L (ref 5–15)
BUN SERPL-MCNC: 15 MG/DL (ref 8–23)
BUN/CREAT SERPL: 12.9 (ref 7–25)
CALCIUM SPEC-SCNC: 9.4 MG/DL (ref 8.6–10.5)
CHLORIDE SERPL-SCNC: 105 MMOL/L (ref 98–107)
CO2 SERPL-SCNC: 23.2 MMOL/L (ref 22–29)
CREAT SERPL-MCNC: 1.16 MG/DL (ref 0.57–1)
EGFRCR SERPLBLD CKD-EPI 2021: 49 ML/MIN/1.73
GLUCOSE SERPL-MCNC: 88 MG/DL (ref 65–99)
POTASSIUM SERPL-SCNC: 4.2 MMOL/L (ref 3.5–5.2)
SODIUM SERPL-SCNC: 140 MMOL/L (ref 136–145)

## 2024-02-28 ENCOUNTER — LAB (OUTPATIENT)
Dept: LAB | Facility: HOSPITAL | Age: 79
End: 2024-02-28
Payer: MEDICARE

## 2024-02-28 ENCOUNTER — OFFICE VISIT (OUTPATIENT)
Dept: PAIN MEDICINE | Facility: CLINIC | Age: 79
End: 2024-02-28
Payer: MEDICARE

## 2024-02-28 VITALS
SYSTOLIC BLOOD PRESSURE: 124 MMHG | BODY MASS INDEX: 28.13 KG/M2 | DIASTOLIC BLOOD PRESSURE: 70 MMHG | WEIGHT: 149 LBS | TEMPERATURE: 96.9 F | HEIGHT: 61 IN

## 2024-02-28 DIAGNOSIS — M48.062 SPINAL STENOSIS OF LUMBAR REGION WITH NEUROGENIC CLAUDICATION: Primary | ICD-10-CM

## 2024-02-28 DIAGNOSIS — M48.062 SPINAL STENOSIS OF LUMBAR REGION WITH NEUROGENIC CLAUDICATION: ICD-10-CM

## 2024-02-28 DIAGNOSIS — G89.29 CHRONIC BILATERAL LOW BACK PAIN WITHOUT SCIATICA: ICD-10-CM

## 2024-02-28 DIAGNOSIS — M54.50 CHRONIC BILATERAL LOW BACK PAIN WITHOUT SCIATICA: ICD-10-CM

## 2024-02-28 DIAGNOSIS — Z51.81 ENCOUNTER FOR THERAPEUTIC DRUG LEVEL MONITORING: Primary | ICD-10-CM

## 2024-02-28 DIAGNOSIS — Z51.81 ENCOUNTER FOR THERAPEUTIC DRUG LEVEL MONITORING: ICD-10-CM

## 2024-02-28 LAB
AMPHET+METHAMPHET UR QL: NEGATIVE
AMPHETAMINES UR QL: NEGATIVE
BARBITURATES UR QL SCN: NEGATIVE
BENZODIAZ UR QL SCN: NEGATIVE
BUPRENORPHINE SERPL-MCNC: NEGATIVE NG/ML
CANNABINOIDS SERPL QL: NEGATIVE
COCAINE UR QL: NEGATIVE
FENTANYL UR-MCNC: NEGATIVE NG/ML
METHADONE UR QL SCN: NEGATIVE
OPIATES UR QL: NEGATIVE
OXYCODONE UR QL SCN: NEGATIVE
PCP UR QL SCN: NEGATIVE
TRICYCLICS UR QL SCN: NEGATIVE

## 2024-02-28 PROCEDURE — 80307 DRUG TEST PRSMV CHEM ANLYZR: CPT

## 2024-02-28 NOTE — PROGRESS NOTES
Referring Physician: No referring provider defined for this encounter.    Primary Physician: Edison Herring MD    CHIEF COMPLAINT or REASON FOR VISIT: Follow-up and Back Pain (Spinal stenosis of lumbar region with neurogenic claudication)      Initial history of present illness on 08/16/2023:  Ms. Marleni Armando is 78 y.o. female who presents as a new patient referral for evaluation treatment bilateral buttock pain.  Ms. Armando states that this has been ongoing but worsening without any specific inciting event or trauma over the last several months.  She describes achy bilateral buttock pain worst in the morning.  She does not particular associate this with ambulation but does have a relationship with standing.  She denies any lower extremity radiation or low back pain.  Patient denies any bowel or bladder dysfunction, lower extremity weakness, new onset saddle anesthesia or unexplained weight loss.     She has been engaged in physical therapy since March 2023 with some improvement.  She was evaluated with bilateral hip x-rays and bilateral hip MRI as well as consultation with orthopedic surgery, Dr. Calderon, who felt that her etiology was likely neuraxial.  She is also under treatment at the [facility] where she has had lumbar x-ray demonstrating multilevel spondylosis and concern for diffuse idiopathic skeletal hyperostosis (DISH).  She has not had any spine surgeries or interventions.  She describes frequent muscle cramping in her buttocks and proximal posterior thighs.  Baclofen caused nausea.    Interval history: Patient returns to clinic today.  She has previously undergone a lumbar interlaminar epidural steroid injection with no benefit.  She was started on gabapentin in December which she reports does provide some relief.  She recently had an appointment with her primary care provider who recommended to her that she could use a higher dose of gabapentin.  Today patient was hesitant to  increase her current dosage.      She has previously met with Dr. Gokul Walden, who recommended lumbar decompression with fusion and stabilization from L3-5, as he believes that she has exhausted all nonoperative measures.  Patient is not interested in any repeat injections, and plans on contacting Dr. Walden's office in the near future to proceed with this procedure.  Unfortunately her  is currently undergoing some health issues and she is waiting until that situation is resolved to schedule surgery.  She has no new complaints at this time.     Interventions:  10/19/2023: L2/3 LESI with no relief    Objective Pain Scoring:   BRIEF PAIN INVENTORY:  Total score:   Pain Score    02/28/24 1359   PainSc:   6        PHQ-2:    PHQ-9:    Opioid Risk Tool:         Review of Systems:   ROS negative except as otherwise noted     Past Medical History:   Past Medical History:   Diagnosis Date    Arthritis     Hyperlipidemia     Hypertension          Past Surgical History:   Past Surgical History:   Procedure Laterality Date    HYSTERECTOMY  11/1993    and REG    TOTAL ABDOMINAL HYSTERECTOMY           Family History   Family History   Problem Relation Age of Onset    Diabetes Other     Kidney disease Other     Hypertension Other          Social History   Social History     Socioeconomic History    Marital status:     Number of children: 1    Highest education level: Master's degree (e.g., MA, MS, Cabrera, MEd, MSW, CYN)   Tobacco Use    Smoking status: Never    Smokeless tobacco: Never   Vaping Use    Vaping Use: Never used   Substance and Sexual Activity    Alcohol use: Not Currently    Drug use: Never    Sexual activity: Yes     Partners: Male        Medications:     Current Outpatient Medications:     amLODIPine (NORVASC) 5 MG tablet, TAKE 1 TABLET DAILY, Disp: 90 tablet, Rfl: 3    aspirin 81 MG tablet, Take  by mouth daily., Disp: , Rfl:     azelastine (ASTELIN) 0.1 % nasal spray, azelastine 137 mcg (0.1 %) nasal  "spray aerosol  U 1 SPR IEN BID, Disp: , Rfl:     gabapentin (Neurontin) 100 MG capsule, Take 1 capsule by mouth 2 (Two) Times a Day., Disp: 60 capsule, Rfl: 2    ibandronate (BONIVA) 150 MG tablet, Take 1 tablet by mouth Every 30 (Thirty) Days., Disp: , Rfl:     meclizine (ANTIVERT) 25 MG tablet, Take 1/2-1 tablet by mouth 3 times daily as needed for dizziness., Disp: 30 tablet, Rfl: 2    multivitamin with minerals tablet tablet, Take 1 tablet by mouth Daily., Disp: , Rfl:     Omega-3 1000 MG capsule, Take  by mouth., Disp: , Rfl:     omeprazole (priLOSEC) 40 MG capsule, TAKE 1 CAPSULE EVERY MORNING (Patient taking differently: 1 capsule Daily As Needed.), Disp: 90 capsule, Rfl: 3    rosuvastatin (CRESTOR) 5 MG tablet, TAKE 1 TABLET EVERY NIGHT   (LABS IN 6 TO 8 WEEKS), Disp: 90 tablet, Rfl: 3    triamterene-hydrochlorothiazide (Maxzide-25) 37.5-25 MG per tablet, Take 1 tablet by mouth Every Morning. Repeat potassium in 1-2 weeks d/c chlorthaldone, Disp: 90 tablet, Rfl: 3        Physical Exam:     Vitals:    02/28/24 1359   BP: 124/70   Temp: 96.9 °F (36.1 °C)   Weight: 67.6 kg (149 lb)   Height: 154.9 cm (61\")   PainSc:   6          General: Alert and oriented, No acute distress.   HEENT: Normocephalic, atraumatic.   Cardiovascular: No gross edema  Respiratory: Respirations are non-labored    Cervical Spine:   No masses or atrophy  Range of motion - Flexion normal. Extension normal. Lateral rotation normal.   Palpation - nontender   Spurling's - negative     Thoracic Spine:   Inspection: no gross abnormality  Paraspinal muscle palpation: nontender  Spinous process palpation: nontender    Lumbar Spine:   No masses or atrophy  Range of motion - Flexion normal. Extension normal. Right Lat Bending normal. Left Lat Bending normal  Facet Loading: Negative bilaterally  Facet Palpation - Nontender   PSIS tenderness - Negative bilaterally  Tony's/OSVALDO/Thigh thrust - Negative bilaterally  Straight leg raise: Negative " bilaterally  Slump test: Negative bilaterally    No tenderness with palpation of bilateral ischial bursae  No tenderness with palpation of bilateral greater trochanters  Negative hip scours bilaterally  Motor Exam:    Strength: Rate on 1-5 scale Right Left    C5-Elbow flexion, Deltoid 5 5    C6-Wrist extension 5 5    C7- Elbow / finger extension 5 5    C8- Finger flexion 5 5    T1- Intrinsics hand 5 5    Strength: Rate on 1-5 scale Right Left    L1/2- hip flexion 5 5    L3- knee extension 5 5    L4- ankle dorsiflexion 5 5    L5- great toe extension 5 5    S1- ankle plantarflexion 5 5    Sensory Exam: Full and equal sensation to light touch throughout.    Neurologic: Cranial Nerves II-XII are grossly intact.   Psychiatric: Cooperative.   Gait: Normal   Assistive Devices: None    Physical exam is consistent and accurate as of 2/28/2024.    Imaging Studies:   No results found for this or any previous visit.      Impression & Plan:   08/16/2023: Marleni Armando is a 78 y.o. female with past medical history significant for GERD, HLD, HTN who presents to the pain clinic for evaluation and treatment of bilateral buttock pain.  On physical exam no provocative maneuvers are able to reproduce her pain.  Clinical examination consistent with claudication versus facet spondylosis.  Will obtain lumbar MRI to further evaluate.  10/2/23: I personally reviewed and interpreted her lumbar MRI dated September 21, 2023 which demonstrates grade 1 anterolisthesis of L3 on L4 with severe canal stenosis.  Lumbar x-ray did not show any dynamic listhesis.  We discussed epidural steroid injection to improve pain.  If greater than 50% relief for at least 2-3 months can consider repeat as needed every 3 to 4 months.  I had an in-depth discussion with the patient regarding the risks of the procedure including bleeding, infection, damage to surrounding structures, paralysis.  We discussed the potential adverse effects of corticosteroid injection  including flushing of the face, lipodystrophy, skin discoloration, elevated blood glucose, increased blood pressure.  Risks of frequent steroid administration include weight gain, hormonal changes, mood changes, osteoporosis.  12/4/2023: Discussed PRP primarily as a therapy for poorly vascular soft tissue injury (meniscus, cartilage, tendon/ligament, disc).  Will refer to NSA for eval.  2/28/2024: Will increase gabapentin from twice daily to 3 times daily.  She plans to contact Dr. Walden for surgery.      1. Spinal stenosis of lumbar region with neurogenic claudication    2. Chronic bilateral low back pain without sciatica              PLAN:  1. Medication Recommendations: Recommend Voltaren topical, NSAIDs, Tylenol.  Can trial turmeric 500 mg twice daily if NSAID contraindicated.      Start gabapentin 100 mg 3 times daily #2 refills given.  As part of this patient's treatment plan, patient will be prescribed controlled substances. The patient has been made aware of appropriate use of such medications, including potential risk of somnolence, limited ability to drive and /or work safely, and potential for dependence or overdose. It has also been made clear that these medications are for use by this patient only, without concomitant use of alcohol or other substances unless prescribed.Controlled substance status of medication discussed with patient, discussed risks of medication including abuse potential and diversion potential and need to follow up for reevaluation appointment in order to receive further refills.    Rodrick was reviewed and compliant.      2. Physical Therapy: Continue HEP    3. Psychological: defer    4. Complementary and alternative (CAM) Therapies:     5. Labs: None indicated     6. Imaging: N/A    7. Interventions: N/A    8. Referrals:     9. Records requested: Neurosurgery notes reviewed    10. Lifestyle goals:    Follow-up 3 months      [facility]  Yefri  MD Kori

## 2024-02-28 NOTE — TELEPHONE ENCOUNTER
Increasing gabapentin 100 mg from twice daily to 3 times daily, #2 refills.   Patient was not interested in an increase in her current dosage.   Controlled substance agreement has already been signed.   Compliance UDS ordered.   Rodrick reviewed and compliant.   Prescription will be available to  on March 5, 2024.

## 2024-02-29 RX ORDER — GABAPENTIN 100 MG/1
100 CAPSULE ORAL 3 TIMES DAILY
Qty: 90 CAPSULE | Refills: 2 | Status: SHIPPED | OUTPATIENT
Start: 2024-03-05

## 2024-03-10 DIAGNOSIS — M48.062 SPINAL STENOSIS OF LUMBAR REGION WITH NEUROGENIC CLAUDICATION: ICD-10-CM

## 2024-03-11 RX ORDER — GABAPENTIN 100 MG/1
100 CAPSULE ORAL 2 TIMES DAILY
Qty: 60 CAPSULE | OUTPATIENT
Start: 2024-03-11

## 2024-03-12 ENCOUNTER — TELEPHONE (OUTPATIENT)
Dept: PAIN MEDICINE | Facility: CLINIC | Age: 79
End: 2024-03-12
Payer: MEDICARE

## 2024-03-12 NOTE — TELEPHONE ENCOUNTER
Spoke with pt today and pt stated that she went in to the pharmacy to  her Gabapentin and there was no refill ready for her to be picked up. I told the pt that she needed to come in and see the doctor in order for her to get her Gabapentin refill.I scheduled her appointment for tomorrow 03/13/2024 @2:00 with Victor Manuel.

## 2024-04-29 DIAGNOSIS — K21.9 GASTROESOPHAGEAL REFLUX DISEASE WITHOUT ESOPHAGITIS: ICD-10-CM

## 2024-04-29 RX ORDER — OMEPRAZOLE 40 MG/1
CAPSULE, DELAYED RELEASE ORAL
Qty: 90 CAPSULE | Refills: 3 | Status: SHIPPED | OUTPATIENT
Start: 2024-04-29

## 2024-05-07 ENCOUNTER — TELEPHONE (OUTPATIENT)
Age: 79
End: 2024-05-07

## 2024-05-28 ENCOUNTER — OFFICE VISIT (OUTPATIENT)
Dept: PAIN MEDICINE | Facility: CLINIC | Age: 79
End: 2024-05-28
Payer: MEDICARE

## 2024-05-28 VITALS — WEIGHT: 152.4 LBS | HEIGHT: 61 IN | BODY MASS INDEX: 28.77 KG/M2

## 2024-05-28 DIAGNOSIS — G89.29 CHRONIC BILATERAL LOW BACK PAIN WITHOUT SCIATICA: ICD-10-CM

## 2024-05-28 DIAGNOSIS — M48.062 SPINAL STENOSIS OF LUMBAR REGION WITH NEUROGENIC CLAUDICATION: Primary | ICD-10-CM

## 2024-05-28 DIAGNOSIS — M54.50 CHRONIC BILATERAL LOW BACK PAIN WITHOUT SCIATICA: ICD-10-CM

## 2024-05-28 PROCEDURE — 1126F AMNT PAIN NOTED NONE PRSNT: CPT

## 2024-05-28 PROCEDURE — 99214 OFFICE O/P EST MOD 30 MIN: CPT

## 2024-05-28 PROCEDURE — 1160F RVW MEDS BY RX/DR IN RCRD: CPT

## 2024-05-28 PROCEDURE — 1159F MED LIST DOCD IN RCRD: CPT

## 2024-05-28 RX ORDER — GABAPENTIN 300 MG/1
300 CAPSULE ORAL 2 TIMES DAILY
Qty: 60 CAPSULE | Refills: 2 | Status: SHIPPED | OUTPATIENT
Start: 2024-05-28

## 2024-05-28 NOTE — PROGRESS NOTES
Referring Physician: No referring provider defined for this encounter.    Primary Physician: Edison Herring MD    CHIEF COMPLAINT or REASON FOR VISIT: Follow-up, Back Pain, and Med Management      Initial history of present illness on 08/16/2023:  Ms. Marleni Armando is 78 y.o. female who presents as a new patient referral for evaluation treatment bilateral buttock pain.  Ms. Armando states that this has been ongoing but worsening without any specific inciting event or trauma over the last several months.  She describes achy bilateral buttock pain worst in the morning.  She does not particular associate this with ambulation but does have a relationship with standing.  She denies any lower extremity radiation or low back pain.  Patient denies any bowel or bladder dysfunction, lower extremity weakness, new onset saddle anesthesia or unexplained weight loss.     She has been engaged in physical therapy since March 2023 with some improvement.  She was evaluated with bilateral hip x-rays and bilateral hip MRI as well as consultation with orthopedic surgery, Dr. Calderon, who felt that her etiology was likely neuraxial.  She is also under treatment at [facility] where she has had lumbar x-ray demonstrating multilevel spondylosis and concern for diffuse idiopathic skeletal hyperostosis (DISH).  She has not had any spine surgeries or interventions.  She describes frequent muscle cramping in her buttocks and proximal posterior thighs.  Baclofen caused nausea.    Interval history: Patient returns to clinic today for medication management.  Today, she continues to complain of bilateral buttock pain with radiation to the bilateral lower extremities.  Pain will radiate along the lateral aspect of both legs.  She has yet to contact Dr. Walden's office about potential surgery as unfortunately her  has been dealing with some health issues of his own.  She would like to handle his situations first  before deciding to proceed with a potential surgery.  She has been taking gabapentin 100 mg 3 times daily with good benefit.  We discussed increasing her dosage at today's appointment.  She has no new complaints at this time she has been tolerating gabapentin without any side effects or concerns.     She has previously met with Dr. Gokul Walden, who recommended lumbar decompression with fusion and stabilization from L3-5, as he believes that she has exhausted all nonoperative measures.  Patient is not interested in any repeat injections, and plans on contacting Dr. Walden's office in the near future to proceed with this procedure.  Unfortunately her  is currently undergoing some health issues and she is waiting until that situation is resolved to schedule surgery.  She has no new complaints at this time.     Interventions:  10/19/2023: L2/3 LESI with no relief    Objective Pain Scoring:   BRIEF PAIN INVENTORY:  Total score:   Pain Score    05/28/24 1125   PainSc: 0-No pain   PainLoc: Back        PHQ-2: PHQ-2 Total Score: 1  PHQ-9: PHQ-9: Brief Depression Severity Measure Score: 1  Opioid Risk Tool:         Review of Systems:   ROS negative except as otherwise noted     Past Medical History:   Past Medical History:   Diagnosis Date    Arthritis     Hyperlipidemia     Hypertension          Past Surgical History:   Past Surgical History:   Procedure Laterality Date    HYSTERECTOMY  11/1993    and REG    TOTAL ABDOMINAL HYSTERECTOMY           Family History   Family History   Problem Relation Age of Onset    Diabetes Other     Kidney disease Other     Hypertension Other          Social History   Social History     Socioeconomic History    Marital status:     Number of children: 1    Highest education level: Master's degree (e.g., MA, MS, Cabrera, MEd, MSW, CYN)   Tobacco Use    Smoking status: Never    Smokeless tobacco: Never   Vaping Use    Vaping status: Never Used   Substance and Sexual Activity     "Alcohol use: Not Currently    Drug use: Never    Sexual activity: Yes     Partners: Male        Medications:     Current Outpatient Medications:     amLODIPine (NORVASC) 5 MG tablet, TAKE 1 TABLET DAILY, Disp: 90 tablet, Rfl: 3    aspirin 81 MG tablet, Take  by mouth daily., Disp: , Rfl:     azelastine (ASTELIN) 0.1 % nasal spray, azelastine 137 mcg (0.1 %) nasal spray aerosol  U 1 SPR IEN BID, Disp: , Rfl:     gabapentin (NEURONTIN) 100 MG capsule, Take 1 capsule by mouth 3 (Three) Times a Day., Disp: 90 capsule, Rfl: 2    meclizine (ANTIVERT) 25 MG tablet, Take 1/2-1 tablet by mouth 3 times daily as needed for dizziness., Disp: 30 tablet, Rfl: 2    multivitamin with minerals tablet tablet, Take 1 tablet by mouth Daily., Disp: , Rfl:     Omega-3 1000 MG capsule, Take  by mouth., Disp: , Rfl:     omeprazole (priLOSEC) 40 MG capsule, TAKE 1 CAPSULE EVERY MORNING, Disp: 90 capsule, Rfl: 3    rosuvastatin (CRESTOR) 5 MG tablet, TAKE 1 TABLET EVERY NIGHT   (LABS IN 6 TO 8 WEEKS), Disp: 90 tablet, Rfl: 3    triamterene-hydrochlorothiazide (Maxzide-25) 37.5-25 MG per tablet, Take 1 tablet by mouth Every Morning. Repeat potassium in 1-2 weeks d/c chlorthaldone, Disp: 90 tablet, Rfl: 3        Physical Exam:     Vitals:    05/28/24 1125   Weight: 69.1 kg (152 lb 6.4 oz)   Height: 154.9 cm (60.98\")   PainSc: 0-No pain   PainLoc: Back          General: Alert and oriented, No acute distress.   HEENT: Normocephalic, atraumatic.   Cardiovascular: No gross edema  Respiratory: Respirations are non-labored    Cervical Spine:   No masses or atrophy  Range of motion - Flexion normal. Extension normal. Lateral rotation normal.   Palpation - nontender   Spurling's - negative     Thoracic Spine:   Inspection: no gross abnormality  Paraspinal muscle palpation: nontender  Spinous process palpation: nontender    Lumbar Spine:   No masses or atrophy  Range of motion - Flexion normal. Extension normal. Right Lat Bending normal. Left Lat " Bending normal  Facet Loading: Negative bilaterally  Facet Palpation - Nontender   PSIS tenderness - Negative bilaterally  Tony's/OSVALDO/Thigh thrust - Negative bilaterally  Straight leg raise: Negative bilaterally  Slump test: Negative bilaterally    No tenderness with palpation of bilateral ischial bursae  No tenderness with palpation of bilateral greater trochanters  Negative hip scours bilaterally  Motor Exam:    Strength: Rate on 1-5 scale Right Left    C5-Elbow flexion, Deltoid 5 5    C6-Wrist extension 5 5    C7- Elbow / finger extension 5 5    C8- Finger flexion 5 5    T1- [employer]ins[employer] hand 5 5    Strength: Rate on 1-5 scale Right Left    L1/2- hip flexion 5 5    L3- knee extension 5 5    L4- ankle dorsiflexion 5 5    L5- great toe extension 5 5    S1- ankle plantarflexion 5 5    Sensory Exam: Full and equal sensation to light touch throughout.    Neurologic: Cranial Nerves II-XII are grossly intact.   Psychiatric: Cooperative.   Gait: Normal   Assistive Devices: None    Physical exam is consistent and accurate as of 5/28/2024    Imaging Studies:   No results found for this or any previous visit.      Impression & Plan:   08/16/2023: Marleni Armando is a 78 y.o. female with past medical history significant for GERD, HLD, HTN who presents to the pain clinic for evaluation and treatment of bilateral buttock pain.  On physical exam no provocative maneuvers are able to reproduce her pain.  Clinical examination consistent with claudication versus facet spondylosis.  Will obtain lumbar MRI to further evaluate.  10/2/23: I personally reviewed and interpreted her lumbar MRI dated September 21, 2023 which demonstrates grade 1 anterolisthesis of L3 on L4 with severe canal stenosis.  Lumbar x-ray did not show any dynamic listhesis.  We discussed epidural steroid injection to improve pain.  If greater than 50% relief for at least 2-3 months can consider repeat as needed every 3 to 4 months.  I had an in-depth discussion  with the patient regarding the risks of the procedure including bleeding, infection, damage to surrounding structures, paralysis.  We discussed the potential adverse effects of corticosteroid injection including flushing of the face, lipodystrophy, skin discoloration, elevated blood glucose, increased blood pressure.  Risks of frequent steroid administration include weight gain, hormonal changes, mood changes, osteoporosis.  12/4/2023: Discussed PRP primarily as a therapy for poorly vascular soft tissue injury (meniscus, cartilage, tendon/ligament, disc).  Will refer to NSA for eval.  2/28/2024: Will increase gabapentin from twice daily to 3 times daily.  She plans to contact Dr. Walden for surgery.  5/28/2024: Will stop gabapentin 100 mg.  Increase gabapentin to 300 mg twice daily      1. Spinal stenosis of lumbar region with neurogenic claudication    2. Chronic bilateral low back pain without sciatica                PLAN:  1. Medication Recommendations: Recommend Voltaren topical, NSAIDs, Tylenol.  Can trial turmeric 500 mg twice daily if NSAID contraindicated.    -Discontinue gabapentin 100 mg  -Start gabapentin 300 mg 2 times daily, #2 refills, 60 total  As part of this patient's treatment plan, patient will be prescribed controlled substances. The patient has been made aware of appropriate use of such medications, including potential risk of somnolence, limited ability to drive and /or work safely, and potential for dependence or overdose. It has also been made clear that these medications are for use by this patient only, without concomitant use of alcohol or other substances unless prescribed.Controlled substance status of medication discussed with patient, discussed risks of medication including abuse potential and diversion potential and need to follow up for reevaluation appointment in order to receive further refills.    Rodrick was reviewed and compliant.      2. Physical Therapy: Continue HEP    3.  Psychological: defer    4. Complementary and alternative (CAM) Therapies:     5. Labs: UDS compliant as of 2/28/2024    6. Imaging: N/A    7. Interventions: N/A    8. Referrals:     9. Records requested: Neurosurgery notes reviewed    10. Lifestyle goals:    Follow-up 3 months for medication management      [facility]  Keisha Roque PA-C

## 2024-05-28 NOTE — TELEPHONE ENCOUNTER
Discontinue gabapentin 100 mg  Start gabapentin 300 mg twice daily, 60 total, #2 refill  Controlled substance agreement previously signed  Rodrick reviewed and compliant  UDS compliant as of 2/28/2024  Appropriate follow-up visit scheduled

## 2024-05-31 ENCOUNTER — OFFICE VISIT (OUTPATIENT)
Dept: INTERNAL MEDICINE | Facility: CLINIC | Age: 79
End: 2024-05-31
Payer: MEDICARE

## 2024-05-31 ENCOUNTER — LAB (OUTPATIENT)
Dept: INTERNAL MEDICINE | Facility: CLINIC | Age: 79
End: 2024-05-31
Payer: MEDICARE

## 2024-05-31 VITALS
SYSTOLIC BLOOD PRESSURE: 120 MMHG | OXYGEN SATURATION: 98 % | BODY MASS INDEX: 28.7 KG/M2 | HEART RATE: 50 BPM | WEIGHT: 152 LBS | DIASTOLIC BLOOD PRESSURE: 64 MMHG | HEIGHT: 61 IN

## 2024-05-31 DIAGNOSIS — R73.02 IMPAIRED GLUCOSE TOLERANCE: ICD-10-CM

## 2024-05-31 DIAGNOSIS — E78.2 MIXED HYPERLIPIDEMIA: Primary | ICD-10-CM

## 2024-05-31 DIAGNOSIS — Z23 NEED FOR PROPHYLACTIC VACCINATION AGAINST STREPTOCOCCUS PNEUMONIAE (PNEUMOCOCCUS): ICD-10-CM

## 2024-05-31 DIAGNOSIS — K57.30 SIGMOID DIVERTICULOSIS: ICD-10-CM

## 2024-05-31 DIAGNOSIS — K21.9 GASTROESOPHAGEAL REFLUX DISEASE WITHOUT ESOPHAGITIS: ICD-10-CM

## 2024-05-31 DIAGNOSIS — Z00.00 ENCOUNTER FOR MEDICARE ANNUAL WELLNESS EXAM: ICD-10-CM

## 2024-05-31 DIAGNOSIS — E78.2 MIXED HYPERLIPIDEMIA: ICD-10-CM

## 2024-05-31 DIAGNOSIS — Z00.00 ROUTINE GENERAL MEDICAL EXAMINATION AT A HEALTH CARE FACILITY: ICD-10-CM

## 2024-05-31 DIAGNOSIS — I10 PRIMARY HYPERTENSION: Primary | ICD-10-CM

## 2024-05-31 DIAGNOSIS — N39.0 URINARY TRACT INFECTION WITHOUT HEMATURIA, SITE UNSPECIFIED: ICD-10-CM

## 2024-05-31 LAB
ALBUMIN UR-MCNC: <1.2 MG/DL
BILIRUB BLD-MCNC: NEGATIVE MG/DL
CLARITY, POC: CLEAR
COLOR UR: YELLOW
CREAT UR-MCNC: 31.8 MG/DL
DEPRECATED RDW RBC AUTO: 44.4 FL (ref 37–54)
ERYTHROCYTE [DISTWIDTH] IN BLOOD BY AUTOMATED COUNT: 15.1 % (ref 12.3–15.4)
EXPIRATION DATE: ABNORMAL
GLUCOSE UR STRIP-MCNC: NEGATIVE MG/DL
HBA1C MFR BLD: 6.2 % (ref 4.8–5.6)
HCT VFR BLD AUTO: 41.9 % (ref 34–46.6)
HGB BLD-MCNC: 13.4 G/DL (ref 12–15.9)
KETONES UR QL: NEGATIVE
LEUKOCYTE EST, POC: ABNORMAL
Lab: ABNORMAL
MCH RBC QN AUTO: 26 PG (ref 26.6–33)
MCHC RBC AUTO-ENTMCNC: 32 G/DL (ref 31.5–35.7)
MCV RBC AUTO: 81.2 FL (ref 79–97)
MICROALBUMIN/CREAT UR: NORMAL MG/G{CREAT}
NITRITE UR-MCNC: NEGATIVE MG/ML
PH UR: 6 [PH] (ref 5–8)
PLATELET # BLD AUTO: 386 10*3/MM3 (ref 140–450)
PMV BLD AUTO: 9 FL (ref 6–12)
PROT UR STRIP-MCNC: NEGATIVE MG/DL
RBC # BLD AUTO: 5.16 10*6/MM3 (ref 3.77–5.28)
RBC # UR STRIP: NEGATIVE /UL
SP GR UR: 1.01 (ref 1–1.03)
UROBILINOGEN UR QL: NORMAL
WBC NRBC COR # BLD AUTO: 6.8 10*3/MM3 (ref 3.4–10.8)

## 2024-05-31 PROCEDURE — 3078F DIAST BP <80 MM HG: CPT | Performed by: INTERNAL MEDICINE

## 2024-05-31 PROCEDURE — G0439 PPPS, SUBSEQ VISIT: HCPCS | Performed by: INTERNAL MEDICINE

## 2024-05-31 PROCEDURE — 1160F RVW MEDS BY RX/DR IN RCRD: CPT | Performed by: INTERNAL MEDICINE

## 2024-05-31 PROCEDURE — 36415 COLL VENOUS BLD VENIPUNCTURE: CPT | Performed by: INTERNAL MEDICINE

## 2024-05-31 PROCEDURE — 82570 ASSAY OF URINE CREATININE: CPT | Performed by: INTERNAL MEDICINE

## 2024-05-31 PROCEDURE — 87086 URINE CULTURE/COLONY COUNT: CPT | Performed by: INTERNAL MEDICINE

## 2024-05-31 PROCEDURE — 84443 ASSAY THYROID STIM HORMONE: CPT | Performed by: INTERNAL MEDICINE

## 2024-05-31 PROCEDURE — 1126F AMNT PAIN NOTED NONE PRSNT: CPT | Performed by: INTERNAL MEDICINE

## 2024-05-31 PROCEDURE — 1170F FXNL STATUS ASSESSED: CPT | Performed by: INTERNAL MEDICINE

## 2024-05-31 PROCEDURE — 1159F MED LIST DOCD IN RCRD: CPT | Performed by: INTERNAL MEDICINE

## 2024-05-31 PROCEDURE — 85027 COMPLETE CBC AUTOMATED: CPT | Performed by: INTERNAL MEDICINE

## 2024-05-31 PROCEDURE — G0009 ADMIN PNEUMOCOCCAL VACCINE: HCPCS | Performed by: INTERNAL MEDICINE

## 2024-05-31 PROCEDURE — 81003 URINALYSIS AUTO W/O SCOPE: CPT | Performed by: INTERNAL MEDICINE

## 2024-05-31 PROCEDURE — 3074F SYST BP LT 130 MM HG: CPT | Performed by: INTERNAL MEDICINE

## 2024-05-31 PROCEDURE — 83036 HEMOGLOBIN GLYCOSYLATED A1C: CPT | Performed by: INTERNAL MEDICINE

## 2024-05-31 PROCEDURE — 90677 PCV20 VACCINE IM: CPT | Performed by: INTERNAL MEDICINE

## 2024-05-31 PROCEDURE — 80061 LIPID PANEL: CPT | Performed by: INTERNAL MEDICINE

## 2024-05-31 PROCEDURE — 80053 COMPREHEN METABOLIC PANEL: CPT | Performed by: INTERNAL MEDICINE

## 2024-05-31 PROCEDURE — 82043 UR ALBUMIN QUANTITATIVE: CPT | Performed by: INTERNAL MEDICINE

## 2024-05-31 NOTE — PROGRESS NOTES
The ABCs of the Annual Wellness Visit  Subsequent Medicare Wellness Visit    Chief Complaint   Patient presents with    Annual Exam      Subjective    History of Present Illness:  Marleni Armando is a 78 y.o. female who presents for a Subsequent Medicare Wellness Visit.    The following portions of the patient's history were reviewed and   updated as appropriate: current medications, past family history, past medical history, past social history, past surgical history, and problem list.     Compared to one year ago, the patient feels her physical   health is the same.    Compared to one year ago, the patient feels her mental   health is better.    Recent Hospitalizations:  She was not admitted to the hospital during the last year.       Current Medical Providers:  Patient Care Team:  Edison Herring MD as PCP - General  SheikhYefri olvera MD as Consulting Physician (Pain Medicine)  Rosa Licona MD as Consulting Physician (Rheumatology)  Gokul Walden MD as Surgeon (Neurosurgery)  Taina Mack CNM as Midwife (Certified Nurse Midwife)    Outpatient Medications Prior to Visit   Medication Sig Dispense Refill    amLODIPine (NORVASC) 5 MG tablet TAKE 1 TABLET DAILY 90 tablet 3    aspirin 81 MG tablet Take  by mouth daily.      azelastine (ASTELIN) 0.1 % nasal spray azelastine 137 mcg (0.1 %) nasal spray aerosol   U 1 SPR IEN BID      gabapentin (NEURONTIN) 300 MG capsule Take 1 capsule by mouth 2 (Two) Times a Day. 60 capsule 2    meclizine (ANTIVERT) 25 MG tablet Take 1/2-1 tablet by mouth 3 times daily as needed for dizziness. 30 tablet 2    multivitamin with minerals tablet tablet Take 1 tablet by mouth Daily.      Omega-3 1000 MG capsule Take  by mouth.      omeprazole (priLOSEC) 40 MG capsule TAKE 1 CAPSULE EVERY MORNING 90 capsule 3    rosuvastatin (CRESTOR) 5 MG tablet TAKE 1 TABLET EVERY NIGHT   (LABS IN 6 TO 8 WEEKS) 90 tablet 3    triamterene-hydrochlorothiazide (Maxzide-25) 37.5-25 MG per tablet  Take 1 tablet by mouth Every Morning. Repeat potassium in 1-2 weeks d/c chlorthaldone 90 tablet 3     No facility-administered medications prior to visit.       No opioid medication identified on active medication list. I have reviewed chart for other potential  high risk medication/s and harmful drug interactions in the elderly.        Aspirin is on active medication list. Aspirin use is indicated based on review of current medical condition/s. Pros and cons of this therapy have been discussed today. Benefits of this medication outweigh potential harm.  Patient has been encouraged to continue taking this medication.  .    Fall Risk Assessment was completed, and patient is at low risk for falls.        Patient Active Problem List   Diagnosis    Gastroesophageal reflux disease without esophagitis    Impaired glucose tolerance    Hyperlipidemia    Hypertension    Osteoporosis    Asteatosis cutis    Cystic lesion of abdominal viscera    Well woman exam    Sigmoid diverticulosis    Heart murmur     Advance Care Planning   Advance Directive is not on file.  ACP discussion was held with the patient during this visit. Patient does not have an advance directive, information provided.    Review of Systems   Constitutional:  Negative for chills, diaphoresis, fatigue and fever.   HENT:  Negative for congestion, ear pain, hearing loss, nosebleeds, postnasal drip, rhinorrhea, sinus pressure and sore throat.    Eyes:  Negative for pain, discharge and itching.   Respiratory:  Negative for cough, chest tightness, shortness of breath and wheezing.    Cardiovascular:  Negative for chest pain, palpitations and leg swelling.   Gastrointestinal:  Negative for abdominal distention, abdominal pain, blood in stool, constipation, diarrhea, nausea and vomiting.        1/13 colonoscopy normal  8/22 colonoscopy with 2 polyps   Endocrine: Negative for polydipsia and polyuria.   Genitourinary:  Negative for difficulty urinating, dysuria,  "frequency and hematuria.        3/23 mammogram and followed by GYN   Musculoskeletal:  Positive for arthralgias, back pain and gait problem. Negative for joint swelling and myalgias.        See HPI   Skin:  Negative for rash and wound.   Neurological:  Positive for numbness (LUE). Negative for syncope, weakness and headaches.   Psychiatric/Behavioral:  Negative for dysphoric mood and sleep disturbance. The patient is not nervous/anxious.          Objective       Vitals:    05/31/24 0930   BP: 120/64   BP Location: Left arm   Patient Position: Sitting   Pulse: 50   SpO2: 98%   Weight: 68.9 kg (152 lb)   Height: 154.9 cm (60.98\")   PainSc: 0-No pain     BMI Readings from Last 1 Encounters:   05/31/24 28.74 kg/m²   BMI is within normal parameters. No follow-up required.  BMI Readings from Last 1 Encounters:   05/31/24 28.74 kg/m²   BMI is above normal parameters. Recommendations include: exercise counseling and nutrition counseling    Does the patient have evidence of cognitive impairment? No    Physical Exam  Constitutional:       Appearance: Normal appearance. She is well-developed.   HENT:      Head: Normocephalic and atraumatic.      Right Ear: External ear normal.      Left Ear: External ear normal.      Nose: Nose normal.      Mouth/Throat:      Mouth: Mucous membranes are moist.      Pharynx: Oropharynx is clear.   Eyes:      Extraocular Movements: Extraocular movements intact.      Conjunctiva/sclera: Conjunctivae normal.      Pupils: Pupils are equal, round, and reactive to light.   Cardiovascular:      Rate and Rhythm: Normal rate and regular rhythm.      Heart sounds: Murmur (1/6) heard.   Pulmonary:      Effort: Pulmonary effort is normal.      Breath sounds: Normal breath sounds.   Abdominal:      General: Bowel sounds are normal.      Palpations: Abdomen is soft.   Musculoskeletal:         General: Normal range of motion.      Cervical back: Normal range of motion and neck supple.   Lymphadenopathy:      " Cervical: No cervical adenopathy.   Skin:     General: Skin is warm and dry.   Neurological:      General: No focal deficit present.      Mental Status: She is alert and oriented to person, place, and time.   Psychiatric:         Mood and Affect: Mood normal.         Behavior: Behavior normal.         Thought Content: Thought content normal.                 HEALTH RISK ASSESSMENT    Smoking Status:  Social History     Tobacco Use   Smoking Status Never   Smokeless Tobacco Never     Alcohol Consumption:  Social History     Substance and Sexual Activity   Alcohol Use Not Currently     Fall Risk Screen:    [facility] Fall Risk Assessment has not been completed.    Depression Screenin/31/2024     9:00 AM   PHQ-2/PHQ-9 Depression Screening   Little Interest or Pleasure in Doing Things 1-->several days   Feeling Down, Depressed or Hopeless 1-->several days   Trouble Falling or Staying Asleep, or Sleeping Too Much 0-->not at all   Feeling Tired or Having Little Energy 0-->not at all   Poor Appetite or Overeating 0-->not at all   Feeling Bad about Yourself - or that You are a Failure or Have Let Yourself or Your Family Down 0-->not at all   Trouble Concentrating on Things, Such as Reading the Newspaper or Watching Television 0-->not at all   Moving or Speaking So Slowly that Other People Could Have Noticed? Or the Opposite - Being So Fidgety 0-->not at all   Thoughts that You Would be Better Off Dead or of Hurting Yourself in Some Way 0-->not at all   PHQ-9: Brief Depression Severity Measure Score 2   If You Checked Off Any Problems, How Difficult Have These Problems Made It For You to Do Your Work, Take Care of Things at Home, or Get Along with Other People? not difficult at all       Health Habits and Functional and Cognitive Screenin/31/2024     9:00 AM   Functional & Cognitive Status   Do you have difficulty preparing food and eating? No   Do you have difficulty bathing yourself, getting dressed or  grooming yourself? No   Do you have difficulty using the toilet? No   Do you have difficulty moving around from place to place? No   Do you have trouble with steps or getting out of a bed or a chair? No   Current Diet Well Balanced Diet   Dental Exam Up to date   Eye Exam Up to date   Exercise (times per week) 2 times per week   Current Exercises Include Other   Do you need help using the phone?  No   Are you deaf or do you have serious difficulty hearing?  No   Do you need help to go to places out of walking distance? No   Do you need help shopping? No   Do you need help preparing meals?  No   Do you need help with housework?  No   Do you need help with laundry? No   Do you need help taking your medications? No   Do you need help managing money? No   Do you ever drive or ride in a car without wearing a seat belt? No   Have you felt unusual stress, anger or loneliness in the last month? No   Who do you live with? Spouse   If you need help, do you have trouble finding someone available to you? No   Have you been bothered in the last four weeks by sexual problems? No   Do you have difficulty concentrating, remembering or making decisions? No       Age-appropriate Screening Schedule:  Refer to the list below for future screening recommendations based on patient's age, sex and/or medical conditions. Orders for these recommended tests are listed in the plan section. The patient has been provided with a written plan.    Health Maintenance   Topic Date Due    RSV Vaccine - Adults (1 - 1-dose 60+ series) Never done    TDAP/TD VACCINES (2 - Tdap) 10/21/2008    Pneumococcal Vaccine 65+ (1 of 1 - PCV) Never done    HEPATITIS C SCREENING  Never done    COVID-19 Vaccine (6 - 2023-24 season) 09/01/2023    INFLUENZA VACCINE  08/01/2024    DXA SCAN  09/12/2024    LIPID PANEL  01/31/2025    ANNUAL WELLNESS VISIT  05/31/2025    BMI FOLLOWUP  05/31/2025    COLORECTAL CANCER SCREENING  08/01/2027    ZOSTER VACCINE  Discontinued               Assessment & Plan     CMS Preventative Services Quick Reference  Risk Factors Identified During Encounter  Chronic Pain: Chronic Pain Educational material Discussed and Printed for Patient.   Immunizations Discussed/Encouraged: Tdap, Prevnar 20 (Pneumococcal 20-valent conjugate), COVID19, and RSV (Respiratory Syncytial Virus)  Inactivity/Sedentary: Patient was advised to exercise at least 150 minutes a week per CDC recommendations.  Polypharmacy: Medication List reviewed and Medications are appropriate for patient  The above risks/problems have been discussed with the patient.  Follow up actions/plans if indicated are seen below in the Assessment/Plan Section.  Pertinent information has been shared with the patient in the After Visit Summary.    Diagnoses and all orders for this visit:    1. Primary hypertension (Primary)    2. Mixed hyperlipidemia    3. Impaired glucose tolerance  -     Hemoglobin A1c    4. Sigmoid diverticulosis    5. Gastroesophageal reflux disease without esophagitis    6. Encounter for Medicare annual wellness exam  -     CBC (No Diff)  -     Comprehensive Metabolic Panel  -     Lipid Panel  -     Hemoglobin A1c  -     TSH  -     POC Urinalysis Dipstick, Automated  -     Microalbumin / Creatinine Urine Ratio - Urine, Clean Catch    7. Routine general medical examination at a health care facility  -     CBC (No Diff)  -     Comprehensive Metabolic Panel  -     Lipid Panel  -     Hemoglobin A1c  -     TSH  -     POC Urinalysis Dipstick, Automated  -     Microalbumin / Creatinine Urine Ratio - Urine, Clean Catch    8. Need for prophylactic vaccination against Streptococcus pneumoniae (pneumococcus)  -     Pneumococcal Conjugate Vaccine 20-Valent (PCV20)        Follow Up:   Return in about 4 months (around 9/30/2024) for Recheck.     An After Visit Summary and PPPS were given to the patient.               HME-counseled on diet and exercise, fasting labs today  htn-cont on CCB and  Maxzide, goal of 130/80  hyperlipidemia-labs on Crestor, counseled on diet  gerd-stable on omeprazole   glucose intolerance-labs improved and advised to watch carbs, AIC today  osteoporosis-DEXA from 7/20 dw patient, on Boniva  xerosis-lachydrin prn, stable  DJD-tylenol prn and diclofenac gel  Diverticulosis-increase fiber  Heart murmur-noted ECHO  CKD-advised NSAIDs avoidance, adequate hydration, labs today  Chronic back pain-per NS and ACL      5/31 labs noted and dw patient    Reviewed the following with the patient: advised patient to avoid alcoholic beverages, encouraged patient to exercise 5-7 days per week for 30 minutes at a time, ideal body weight discussed with patient, and weight loss encouraged.

## 2024-06-01 LAB
ALBUMIN SERPL-MCNC: 4.4 G/DL (ref 3.5–5.2)
ALBUMIN/GLOB SERPL: 1.4 G/DL
ALP SERPL-CCNC: 55 U/L (ref 39–117)
ALT SERPL W P-5'-P-CCNC: 18 U/L (ref 1–33)
ANION GAP SERPL CALCULATED.3IONS-SCNC: 9 MMOL/L (ref 5–15)
AST SERPL-CCNC: 16 U/L (ref 1–32)
BACTERIA SPEC AEROBE CULT: NO GROWTH
BILIRUB SERPL-MCNC: 0.3 MG/DL (ref 0–1.2)
BUN SERPL-MCNC: 14 MG/DL (ref 8–23)
BUN/CREAT SERPL: 13.6 (ref 7–25)
CALCIUM SPEC-SCNC: 9.6 MG/DL (ref 8.6–10.5)
CHLORIDE SERPL-SCNC: 104 MMOL/L (ref 98–107)
CHOLEST SERPL-MCNC: 163 MG/DL (ref 0–200)
CO2 SERPL-SCNC: 25 MMOL/L (ref 22–29)
CREAT SERPL-MCNC: 1.03 MG/DL (ref 0.57–1)
EGFRCR SERPLBLD CKD-EPI 2021: 56.5 ML/MIN/1.73
GLOBULIN UR ELPH-MCNC: 3.1 GM/DL
GLUCOSE SERPL-MCNC: 97 MG/DL (ref 65–99)
HDLC SERPL-MCNC: 60 MG/DL (ref 40–60)
LDLC SERPL CALC-MCNC: 91 MG/DL (ref 0–100)
LDLC/HDLC SERPL: 1.52 {RATIO}
POTASSIUM SERPL-SCNC: 4.1 MMOL/L (ref 3.5–5.2)
PROT SERPL-MCNC: 7.5 G/DL (ref 6–8.5)
SODIUM SERPL-SCNC: 138 MMOL/L (ref 136–145)
TRIGL SERPL-MCNC: 59 MG/DL (ref 0–150)
TSH SERPL DL<=0.05 MIU/L-ACNC: 1.88 UIU/ML (ref 0.27–4.2)
VLDLC SERPL-MCNC: 12 MG/DL (ref 5–40)

## 2024-06-04 ENCOUNTER — TELEPHONE (OUTPATIENT)
Dept: INTERNAL MEDICINE | Facility: CLINIC | Age: 79
End: 2024-06-04
Payer: MEDICARE

## 2024-06-04 NOTE — TELEPHONE ENCOUNTER
Caller: Marleni Armando    Relationship: Self    Best call back number: [phone]    Caller requesting test results: YES    What test was performed: LABS    When was the test performed: 05/31/2024    Where was the test performed: [facility]    Additional notes: OUR PATIENT WOULD LIKE TO  A PAPER COPY OF HER LAB RESULTS PLEASE. CALL PATIENT WHEN THAT IS READY FOR HER TO

## 2024-06-04 NOTE — TELEPHONE ENCOUNTER
I called pt and lvm stating that labs are ready to be picked up.     Hub can relay:  Pt labs are printed and up front ready to be picked up

## 2024-07-11 NOTE — PROGRESS NOTES
Subjective   Chief Complaint   Patient presents with    Annual Exam     Well woman exam, Hysterectomy mammo was 3/2024     Marleni Armando is a 79 y.o. year old  Medicare patient presenting to be seen in follow up regarding osteoporosis and vaginal atrophy.  At her appointment last year she reported she had stopped estrogen cream and was not having any bothersome symptoms.  She is followed by [facility].  Her last colonoscopy was 2022.  She had a BI-RADS 2 mammogram March of this year.  This past year she has not been on hormone replacement therapy.  She has not had any vaginal bleeding in the last 12 months.   Menopausal symptoms are not present.    SEXUAL Hx:  She is currently sexually active.  In the past year there there has been NO new sexual partners.    Condoms are never used.  She would not like to be screened for STD's at today's exam.  [place] is painful: no  HEALTH Hx:  She exercises regularly: yes. Currently doing seated exercises due to her spinal stenosis- sees Dr Freire, considering surgery , goes to [facility] twice a week   She wears her seat belt: yes.  She has concerns about domestic violence: no.  She has noticed changes in height: no  OTHER THINGS SHE WANTS TO DISCUSS TODAY:  Nothing else    The following portions of the patient's history were reviewed and updated as appropriate:problem list, current medications, allergies, past family history, past medical history, past social history, and past surgical history.    Social History    Tobacco Use      Smoking status: Never      Smokeless tobacco: Never    Review of Systems  Constitutional POS: nothing reported    NEG: anorexia or night sweats   Genitourinary POS:  at times she has incomplete bladder emptying    NEG: dysuria or hematuria   Gastointestinal POS: nothing reported    NEG: bloating, change in bowel habits, melena, or reflux symptoms   Integument POS: nothing reported    NEG: moles that are  "changing in size, shape, color or rashes   Breast POS: nothing reported    NEG: persistent breast lump, skin dimpling, or nipple discharge        Objective   /80 (BP Location: Left arm, Patient Position: Sitting, Cuff Size: Adult)   Ht 154.9 cm (60.98\")   Wt 68 kg (150 lb)   LMP  (LMP Unknown)   BMI 28.36 kg/m²     General:  well developed; well nourished  no acute distress   Skin:  No suspicious lesions seen   Thyroid: normal to inspection and palpation   Breasts:  Examined in supine position  Symmetric without masses or skin dimpling  Nipples normal without inversion, lesions or discharge  There are no palpable axillary nodes   Abdomen: soft, non-tender; no masses  no umbilical or inguinal hernias are present  no hepato-splenomegaly   Pelvis: Clinical staff was present for exam  External genitalia:  normal appearance of the external genitalia including Bartholin's and [place]la[place]'s glands.  :  urethral meatus normal;  Vaginal:  atrophic mucosal changes are present;  Cervix:  absent.  Uterus:  absent.  Adnexa:  non palpable bilaterally.  Rectal:  digital rectal exam not performed; anus visually normal appearing.        Assessment   Normal gyn exam  Osteoporosis  Menopausal female currently not on HRT - without significant symptoms affecting activities of daily living  She is up to date on all relevant gynecologic and colorectal screenings     Plan   Discussed with Marleni some of her bladder symptoms may be related to her spinal stenosis as well.  Notify provider if any dysuria or hematuria or concerns for infection.  She was encouraged to get yearly mammograms.  She should report any palpable breast lump(s) or skin changes regardless of mammographic findings.  I explained to Marleni that notification regarding her mammogram results will come from the center performing the study.  Our office will not be routinely calling with mammogram results.  It is her responsibility to make sure that the results from the " mammogram are communicated to her by the breast center.  If she has any questions about the results, she is welcome to call our office anytime.  Her vaccine record was reviewed and updated.  Continue follow-up with [facility] for [facility] care  The importance of keeping all planned follow-up and taking all medications as prescribed was emphasized.  Follow up for annual exam 1 year    No orders of the defined types were placed in this encounter.         This note was electronically signed.  SOPHIA Logan  July 12, 2024    Part of this note may be an electronic transcription/translation of spoken language to printed text using the Dragon Dictation System.

## 2024-07-12 ENCOUNTER — OFFICE VISIT (OUTPATIENT)
Dept: OBSTETRICS AND GYNECOLOGY | Facility: CLINIC | Age: 79
End: 2024-07-12
Payer: MEDICARE

## 2024-07-12 VITALS
DIASTOLIC BLOOD PRESSURE: 80 MMHG | SYSTOLIC BLOOD PRESSURE: 130 MMHG | BODY MASS INDEX: 28.32 KG/M2 | HEIGHT: 61 IN | WEIGHT: 150 LBS

## 2024-07-12 DIAGNOSIS — M81.8 OTHER OSTEOPOROSIS WITHOUT CURRENT PATHOLOGICAL FRACTURE: Primary | ICD-10-CM

## 2024-07-12 PROCEDURE — 1160F RVW MEDS BY RX/DR IN RCRD: CPT | Performed by: NURSE PRACTITIONER

## 2024-07-12 PROCEDURE — 99213 OFFICE O/P EST LOW 20 MIN: CPT | Performed by: NURSE PRACTITIONER

## 2024-07-12 PROCEDURE — 3079F DIAST BP 80-89 MM HG: CPT | Performed by: NURSE PRACTITIONER

## 2024-07-12 PROCEDURE — 3075F SYST BP GE 130 - 139MM HG: CPT | Performed by: NURSE PRACTITIONER

## 2024-07-12 PROCEDURE — 1159F MED LIST DOCD IN RCRD: CPT | Performed by: NURSE PRACTITIONER

## 2024-07-15 ENCOUNTER — TELEPHONE (OUTPATIENT)
Age: 79
End: 2024-07-15
Payer: MEDICARE

## 2024-07-15 DIAGNOSIS — M51.34 DDD (DEGENERATIVE DISC DISEASE), THORACIC: ICD-10-CM

## 2024-07-15 DIAGNOSIS — N28.9 RENAL INSUFFICIENCY: Primary | ICD-10-CM

## 2024-07-15 DIAGNOSIS — R53.83 OTHER FATIGUE: ICD-10-CM

## 2024-07-15 NOTE — TELEPHONE ENCOUNTER
Pt needs a new lab order pt is scheduled to see  on 7/25/24 she will need to have labs done before her visit.please call when complete pt stated it's ok to leave a .

## 2024-07-25 ENCOUNTER — OFFICE VISIT (OUTPATIENT)
Age: 79
End: 2024-07-25
Payer: MEDICARE

## 2024-07-25 VITALS
HEIGHT: 61 IN | DIASTOLIC BLOOD PRESSURE: 78 MMHG | SYSTOLIC BLOOD PRESSURE: 126 MMHG | HEART RATE: 56 BPM | TEMPERATURE: 97.1 F | WEIGHT: 148.8 LBS | BODY MASS INDEX: 28.09 KG/M2

## 2024-07-25 DIAGNOSIS — M81.0 AGE-RELATED OSTEOPOROSIS WITHOUT CURRENT PATHOLOGICAL FRACTURE: Primary | ICD-10-CM

## 2024-07-25 DIAGNOSIS — N28.9 RENAL INSUFFICIENCY: ICD-10-CM

## 2024-07-25 DIAGNOSIS — M25.551 BILATERAL HIP PAIN: ICD-10-CM

## 2024-07-25 DIAGNOSIS — M25.552 BILATERAL HIP PAIN: ICD-10-CM

## 2024-07-25 DIAGNOSIS — M51.369 DDD (DEGENERATIVE DISC DISEASE), LUMBAR: ICD-10-CM

## 2024-07-25 PROBLEM — M51.36 DDD (DEGENERATIVE DISC DISEASE), LUMBAR: Status: ACTIVE | Noted: 2024-07-25

## 2024-07-25 RX ORDER — IBANDRONATE SODIUM 150 MG/1
150 TABLET, FILM COATED ORAL
Qty: 3 TABLET | Refills: 3 | Status: CANCELLED | OUTPATIENT
Start: 2024-07-25

## 2024-07-25 NOTE — ASSESSMENT & PLAN NOTE
Stage 3A  Mother and brother had renal disease.  2/23 Cr 1.06/54.9  6/23 Cr 1.03/57  11/3/23 cr 1.02/57 4/24 1.18/48      Plan:    PCP has stopped her meloxicam and told her to avoid all NSAIDs. She does have pre-diabetes and HTN.  She may need to stop Ibandronate in future but everything currently is stable to improved.  Encourage her to stay well hydrated.  Recheck labs in 3 months.  I do think with her GFR being 48 she needs to d/c ibandronate and discuss other treatment for her bones.

## 2024-07-25 NOTE — ASSESSMENT & PLAN NOTE
Dexa scan 9/12/22-  Lumbar spine -1.1, R Total hip -1.1, R femoral neck -2.5.  Risk factors - She uses PPI as needed. Post menopausal , Age > 65    9/22 Normal osteocalcin, normal cbc, CMP normal, PTH normal, Vitamin D 53.4, TSH normal. N-telopeptide normal, SPEP negative.        Plan:    Dexa scan due 9/12/24  1000-2000IU of vitamin D 3 gel caps per day.  800-1000MG of Calcium per day. Split dose in am and evening.  Weight bearing exercise.  Kidney function is worse. We will have her see nephrology for evaluation. We will hold Boniva since she has upcoming dental work and wait to see what nephrology says.  Prolia brochure given and discussed today.  If any dental work she would need to be off medication 3 months before procedure and 3 months after.  RTC 4 mo

## 2024-07-25 NOTE — PROGRESS NOTES
[facility]ology Office Follow Up Visit     Office Follow Up      Date: 07/25/2024   Patient Name: Marleni Armando  MRN: [id number]  YOB: 1945    Referring Physician: Edison Herring MD     No chief complaint on file.      History of Present Illness: Marleni Armando is a 79 y.o. female who is here today for follow up on   History of Present Illness  The patient is using her cane for long distances.    She experiences morning stiffness lasting only about 10 minutes. Her leg pain, rated at 2 out of 10, is described as more of a discomfort than pain. The pain is more pronounced on the left side and radiates down to the mid-thigh. She is attempting to distinguish between the pain and discomfort. She attends a senior center where she performs chair exercises and stretches before standing after sitting for extended periods, which she finds beneficial. Her last consultation with Dr. Cowan was in 02/2024, who diagnosed her with spinal stenosis in her lower back and neurogenic claudication. Dr. Recinos discussed the possibility of using rods in her back, which she is considering. She does not experience significant pain in her upper back. She has not taken gabapentin for at least 2 months and occasionally uses Tylenol for pain management.    Her last labs were conducted by Dr. Luna in 05/2024, and she is due to see him again in 08/2024. She was referred to a nephrologist for diminished kidney function, but did not follow through. Dr. Luna advised her to stay hydrated, which she is working on.    Supplemental Information  Dr. Taina Mack is her gynecologist.       Result Review :        Results  Laboratory Studies  Creatinine 1.18 with a GFR of 48. A1c was 6.20. Urine culture was negative. CBC was normal. TSH was normal. Cholesterol and triglycerides were normal.    Imaging  Lumbar spine MRI from September 2023 showed severe spinal canal and bilateral neural  foraminal stenosis at the L3-4 level. X-rays showed degenerative disc disease and facet joint arthritis. Thoracic spine film in August 2023 showed possible ankylosing spondylitis.          Subjective     Allergies   Allergen Reactions    Codeine Nausea Only     Pt advised been long time ago, she recalls being sick to the stomach and dizzy.         Current Outpatient Medications:     amLODIPine (NORVASC) 5 MG tablet, TAKE 1 TABLET DAILY, Disp: 90 tablet, Rfl: 3    aspirin 81 MG tablet, Take  by mouth daily., Disp: , Rfl:     azelastine (ASTELIN) 0.1 % nasal spray, azelastine 137 mcg (0.1 %) nasal spray aerosol  U 1 SPR IEN BID, Disp: , Rfl:     Calcium-Phosphorus-Vitamin D (CITRACAL +D3 PO), Take  by mouth., Disp: , Rfl:     chlorthalidone (HYGROTON) 25 MG tablet, Take 1 tablet by mouth Daily., Disp: , Rfl:     gabapentin (NEURONTIN) 300 MG capsule, Take 1 capsule by mouth 2 (Two) Times a Day., Disp: 60 capsule, Rfl: 2    ibandronate (BONIVA) 150 MG tablet, Take 1 tablet by mouth. TAKE 1 TABLET EVERY MONTH (SAME DATE) WITH A FULL GLASS OF WATER AND REMAIN IN UPRIGHT POSITION LEAST 1 HOUR, Disp: , Rfl:     meclizine (ANTIVERT) 25 MG tablet, Take 1/2-1 tablet by mouth 3 times daily as needed for dizziness., Disp: 30 tablet, Rfl: 2    multivitamin with minerals tablet tablet, Take 1 tablet by mouth Daily., Disp: , Rfl:     Omega-3 1000 MG capsule, Take  by mouth., Disp: , Rfl:     omeprazole (priLOSEC) 40 MG capsule, TAKE 1 CAPSULE EVERY MORNING, Disp: 90 capsule, Rfl: 3    rosuvastatin (CRESTOR) 5 MG tablet, TAKE 1 TABLET EVERY NIGHT   (LABS IN 6 TO 8 WEEKS), Disp: 90 tablet, Rfl: 3    triamterene-hydrochlorothiazide (Maxzide-25) 37.5-25 MG per tablet, Take 1 tablet by mouth Every Morning. Repeat potassium in 1-2 weeks d/c chlorthaldone, Disp: 90 tablet, Rfl: 3    Past Medical History:   Diagnosis Date    Arthritis     GERD (gastroesophageal reflux disease)     Hemorrhoids     Hyperlipidemia     Hypertension      Pre-diabetes         Past Surgical History:   Procedure Laterality Date    HYSTERECTOMY  11/1993    and REG    TOTAL ABDOMINAL HYSTERECTOMY         Family History   Problem Relation Age of Onset    Diabetes Mother     Hypertension Mother     Kidney disease Mother     Arthritis Mother     Hypertension Sister     Diabetes Brother     Diabetes Other     Kidney disease Other     Hypertension Other         Social History     Socioeconomic History    Marital status:     Number of children: 1    Highest education level: Master's degree (e.g., MA, MS, Cabrera, MEd, MSW, CYN)   Tobacco Use    Smoking status: Never    Smokeless tobacco: Never   Vaping Use    Vaping status: Never Used   Substance and Sexual Activity    Alcohol use: Not Currently    Drug use: Never    Sexual activity: Yes     Partners: Male       Review of Systems   Constitutional:  Negative for fatigue and fever.   HENT:  Negative for mouth sores, nosebleeds, swollen glands and trouble swallowing.    Eyes:  Negative for blurred vision, double vision, photophobia, pain and visual disturbance.   Respiratory:  Negative for apnea, cough, choking and shortness of breath.    Cardiovascular:  Negative for chest pain, palpitations and leg swelling.   Gastrointestinal:  Negative for abdominal pain, blood in stool, constipation, diarrhea, nausea, vomiting and GERD.   Endocrine: Negative for cold intolerance, heat intolerance, polydipsia, polyphagia and polyuria.   Genitourinary:  Negative for difficulty urinating, dysuria, genital sores, hematuria and urinary incontinence.   Musculoskeletal:  Negative for arthralgias, back pain, gait problem, joint swelling, myalgias, neck pain, neck stiffness and bursitis.        Muscle cramping  Tight hip flexers   Skin:  Negative for rash.   Allergic/Immunologic: Negative for environmental allergies and food allergies.   Neurological:  Negative for dizziness, tremors, seizures, syncope, weakness, numbness, headache, memory  problem and confusion.   Hematological:  Negative for adenopathy. Does not bruise/bleed easily.   Psychiatric/Behavioral:  Negative for sleep disturbance, suicidal ideas, depressed mood and stress. The patient is not nervous/anxious.         I have reviewed and updated the patient's chief complaint, history of present illness, review of systems, past medical history, surgical history, family history, social history, medications and allergy list as appropriate.     Objective    Vital Signs:   There were no vitals filed for this visit.  Marleni Armando reports a pain score of .  Given her pain assessment as noted, treatment options were discussed and the following options were decided upon as a follow-up plan to address the patient's pain: .      There is no height or weight on file to calculate BMI.         Physical Exam   Physical Exam  Patient is an alert female, in no acute distress.  Head is atraumatic and normocephalic. Eyes appear normal. Pupils are round and equal. Extraocular muscles are intact. Oropharynx is negative.  Heart rhythm is regular without rubs, gallops, or murmurs.  Shoulders, spine, and muscles are nontender. There is a moderate decrease in the cervical spine bilaterally. Hand joints, particularly the wrist, MCPs, and PIPs are nontender without synovitis. Knees show slight crepitus. Ankles and MTPs are nontender.  Skin is negative for rashes.    Physical Exam  There is currently no information documented on the homunculus. Go to the Rheumatology activity and complete the homunculus joint exam.     Results Review:   Imaging Results (Last 24 Hours)       ** No results found for the last 24 hours. **            Procedures    Assessment / Plan    Assessment/Plan:   There are no diagnoses linked to this encounter.      Assessment & Plan  1. Osteoporosis.  She will be due for her bone density in the fall and then she is supposed to be taking 1000 to 2000 units of vitamin D and 800 to 1000 mg of  calcium per day. She can continue on the Boniva or ibandronate. She could continue on that as long as the kidney function stays normal; however, we did discontinue it to wait for nephrology, but her kidney function has rebounded. She did not have any major dental procedures. If she had any dental work, she would have to be off of her osteoporosis medication 3 months before and 3 months after. I did discuss Prolia shots last time, but she did not want to proceed with that. We will plan on setting her up for her bone density in 09/2024, but she may have to cancel and reschedule depending on her back surgery.    2. Hip pain.  She has seen Dr. Calderon for evaluation and injection of that left hip in the past. She has moderate osteoarthritis by x-ray. At this point, that does not appear to be an issue, but she will need to follow up with Dr. Calderon as needed.    3. Renal insufficiency.  Her follow-up kidney function in 05/2024 showed that her creatinine was back to baseline at 1.03 with a GFR of 56.5. This was after we had discontinued her Boniva/abandronate. At this point, she does not feel that she would like to go on the Prolia, so I think our best bet in terms of whether or not she needs treatment is to look at the upcoming bone density scan, follow her kidney function, and then try to make a decision.    4. Degenerative disc disease.  Dr. Recinos has discussed surgery for her severe spinal stenosis and she is thinking about doing that in 09/2024. She has some mild neurogenic claudication in her left leg. She avoids NSAIDs due to her kidney function. Currently, today she is not having any pain in the thoracic or lumbar spine. There was a question as to whether or not she might have had some DISH versus AS; however, she has no features of AS and is HLA-B27 negative. So at this point, we will continue to observe her and see how she does with her surgery. She does have gabapentin for pain, but she has been off of it for 2  months because she has not needed it. At this point, we will continue to observe her and see how she does with her surgery. Another alternative for her if needed would be Tylenol Arthritis 2 twice a day as needed.    Follow-up  The patient will follow up after 09/12/2024. It can be done at the imaging center on [facility] or [facility] if needed.        Follow Up:   No follow-ups on file.         Rosa Licona MD  [facility]     Encounter Administratively Closed by Health Information Management

## 2024-07-25 NOTE — ASSESSMENT & PLAN NOTE
Significant DDD and facet arthritis.   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  8/18/2023 t spine film consistent with ankylosing spondylitis.  8/15/23 HLA B27 negative.      Plan:    Discussed TNFi with patient, but she prefers to think about this and discuss again at the next visit.  She received and reviewed the handout on spondyloarthritis.  We discussed ankylosing spondylitis at length.  Currently seeing Dr. Walden and Dr. Sheikh. They feels she has spinal stenosis with neurogenic claudication  Recent steroid injection of lumbar spine ws not helpful.  Avoiding NSAIDs due to increased renal function.  The MRI of the spine will be useful to us in terms of AS vs DISH. We will request this

## 2024-08-01 ENCOUNTER — TELEPHONE (OUTPATIENT)
Dept: NEUROSURGERY | Facility: CLINIC | Age: 79
End: 2024-08-01
Payer: MEDICARE

## 2024-08-01 DIAGNOSIS — M43.16 SPONDYLOLISTHESIS OF LUMBAR REGION: ICD-10-CM

## 2024-08-01 DIAGNOSIS — M48.062 SPINAL STENOSIS, LUMBAR REGION, WITH NEUROGENIC CLAUDICATION: Primary | ICD-10-CM

## 2024-08-01 NOTE — TELEPHONE ENCOUNTER
Caller: Marleni Armando    Relationship to patient: Self    Best call back number: [phone]    Chief complaint: SCHEDULE APPT. TO DISCUSS SURGERY    Type of visit: FOLLOW UP    Requested date: ASAP     If rescheduling, when is the original appointment: NA     Additional notes:PATIENT CALLED TO ASK FOR A CALL BACK FOR SOMEONE TO SET UP ANOTHER APPT. WITH  TO DISCUSS SURGERY-PATIENT WAS LAST SEEN IN FEB. SHE IS NOW WANTING TO MAKE ANOTHER APPT. TO DISCUSS MOVING FORWARD-SENDING TO OFFICE FOR SCHEDULING THANK YOU

## 2024-08-13 ENCOUNTER — HOSPITAL ENCOUNTER (OUTPATIENT)
Dept: MRI IMAGING | Facility: HOSPITAL | Age: 79
Discharge: HOME | End: 2024-08-13
Admitting: NEUROLOGICAL SURGERY
Payer: MEDICARE

## 2024-08-13 DIAGNOSIS — M48.062 SPINAL STENOSIS, LUMBAR REGION, WITH NEUROGENIC CLAUDICATION: ICD-10-CM

## 2024-08-13 DIAGNOSIS — M43.16 SPONDYLOLISTHESIS OF LUMBAR REGION: ICD-10-CM

## 2024-08-13 PROCEDURE — 72148 MRI LUMBAR SPINE W/O DYE: CPT

## 2024-08-16 ENCOUNTER — OFFICE VISIT (OUTPATIENT)
Dept: NEUROSURGERY | Facility: CLINIC | Age: 79
End: 2024-08-16
Payer: MEDICARE

## 2024-08-16 VITALS — BODY MASS INDEX: 28.13 KG/M2 | HEIGHT: 61 IN | TEMPERATURE: 96.2 F | WEIGHT: 149 LBS

## 2024-08-16 DIAGNOSIS — M48.062 SPINAL STENOSIS, LUMBAR REGION, WITH NEUROGENIC CLAUDICATION: Primary | ICD-10-CM

## 2024-08-16 DIAGNOSIS — M51.36 DDD (DEGENERATIVE DISC DISEASE), LUMBAR: ICD-10-CM

## 2024-08-16 DIAGNOSIS — M43.16 SPONDYLOLISTHESIS OF LUMBAR REGION: ICD-10-CM

## 2024-08-16 PROCEDURE — 99213 OFFICE O/P EST LOW 20 MIN: CPT | Performed by: NEUROLOGICAL SURGERY

## 2024-08-16 PROCEDURE — 1160F RVW MEDS BY RX/DR IN RCRD: CPT | Performed by: NEUROLOGICAL SURGERY

## 2024-08-16 PROCEDURE — 1159F MED LIST DOCD IN RCRD: CPT | Performed by: NEUROLOGICAL SURGERY

## 2024-08-16 NOTE — PROGRESS NOTES
Patient: Marleni Armando  : 1945    Primary Care Provider: Edison Herring MD    Requesting Provider: As above        History    Chief Complaint: Low back and lower extremity pain.    History of Present Illness: Ms. Armando is a 79-year-old retired  with a couple year history of progressive stiffness in her back.  She has known spondylolisthesis with stenosis and instability.  She has renal insufficiency and NSAIDs were discontinued in the past.  She has done physical therapy in the past.  She had an epidural injection in 2023.  She has no pain at this point but she describes a tightness across her hips and into the left thigh when she stands or walks too long.  That does improve when she sits down or lies down.  The symptoms are at this point annoying not debilitating.    Review of Systems   Constitutional:  Negative for activity change, appetite change, chills, diaphoresis, fatigue, fever and unexpected weight change.   HENT:  Negative for congestion, dental problem, drooling, ear discharge, ear pain, facial swelling, hearing loss, mouth sores, nosebleeds, postnasal drip, rhinorrhea, sinus pressure, sinus pain, sneezing, sore throat, tinnitus, trouble swallowing and voice change.    Eyes:  Negative for photophobia, pain, discharge, redness, itching and visual disturbance.   Respiratory:  Negative for apnea, cough, choking, chest tightness, shortness of breath, wheezing and stridor.    Cardiovascular:  Negative for chest pain, palpitations and leg swelling.   Gastrointestinal:  Negative for abdominal distention, abdominal pain, anal bleeding, blood in stool, constipation, diarrhea, nausea, rectal pain and vomiting.   Endocrine: Negative for cold intolerance, heat intolerance, polydipsia, polyphagia and polyuria.   Genitourinary:  Negative for decreased urine volume, difficulty urinating, dyspareunia, dysuria, enuresis, flank pain, frequency, genital sores, hematuria,  "menstrual problem, pelvic pain, urgency, vaginal bleeding, vaginal discharge and vaginal pain.   Musculoskeletal:  Positive for back pain. Negative for arthralgias, gait problem, joint swelling, myalgias, neck pain and neck stiffness.   Skin:  Negative for color change, pallor, rash and wound.   Allergic/Immunologic: Negative for environmental allergies, food allergies and immunocompromised state.   Neurological:  Negative for dizziness, tremors, seizures, syncope, facial asymmetry, speech difficulty, weakness, light-headedness, numbness and headaches.   Hematological:  Negative for adenopathy. Does not bruise/bleed easily.   Psychiatric/Behavioral:  Negative for agitation, behavioral problems, confusion, decreased concentration, dysphoric mood, hallucinations, self-injury, sleep disturbance and suicidal ideas. The patient is not nervous/anxious and is not hyperactive.        The patient's past medical history, past surgical history, family history, and social history have been reviewed at length in the electronic medical record.      Physical Exam:   Temp 96.2 °F (35.7 °C) (Infrared)   Ht 154.9 cm (61\")   Wt 67.6 kg (149 lb)   LMP  (LMP Unknown)   BMI 28.15 kg/m²   Deferred    Medical Decision Making    Data Review:   (All imaging studies were personally reviewed unless stated otherwise)  Flexion-extension films from last year demonstrate a prominent listhesis of L3 on L4 and slightly less so at L4 on L5.  This offset is more than is noted on the MRI imaging.    Follow-up MRI of the lumbar spine dated 8/13/2024 demonstrates similar findings as noted previously.  The above-noted listhesis at L3-4 and L4-5 are noted once again.  There is high-grade stenosis at L3-4 and generous stenosis at L4-5.    Diagnosis:   1.  Lumbar stenosis with neurogenic claudication.  2.  Lumbar spondylolisthesis with instability.    Treatment Options:   After lengthy discussion we have determined that the patient is getting by.  If her " symptoms become limiting or painful then I would consider decompression with fusion and stabilization from L3-5.  I will be available if she has further difficulties.      Scribed for Gokul Walden MD by Rochelle Cast CMA on 8/16/2024 15:53 EDT       I, Dr. Walden, personally performed the services described in the documentation, as scribed in my presence, and it is both accurate and complete.

## 2024-09-03 ENCOUNTER — TELEPHONE (OUTPATIENT)
Age: 79
End: 2024-09-03
Payer: MEDICARE

## 2024-09-03 NOTE — TELEPHONE ENCOUNTER
Pt called stating that when she saw you about a month ago she was told to postpone her DEXA scan because she was contemplating surgery. She has decided not to get the surgery and would like to have her DEXA scheduled.

## 2024-09-04 NOTE — TELEPHONE ENCOUNTER
Referral updated to reflect [place] location and ask hub to proceed with getting patient scheduled.

## 2024-09-04 NOTE — TELEPHONE ENCOUNTER
Her bone density is due after September 12, 2024.  It was done through [facility].  However I cannot tell where in the [facility] it [facility] done and it is important to try to do these on the same machine.  Does she remember which [facility] facility [facility] in?

## 2024-09-04 NOTE — TELEPHONE ENCOUNTER
Per [facility], it was done at the [facility] Order is in Chart. Please see previous msg

## 2024-10-22 RX ORDER — AMLODIPINE BESYLATE 5 MG/1
TABLET ORAL
Qty: 90 TABLET | Refills: 3 | Status: SHIPPED | OUTPATIENT
Start: 2024-10-22

## 2024-10-24 ENCOUNTER — OFFICE VISIT (OUTPATIENT)
Dept: INTERNAL MEDICINE | Facility: CLINIC | Age: 79
End: 2024-10-24
Payer: MEDICARE

## 2024-10-24 ENCOUNTER — LAB (OUTPATIENT)
Dept: INTERNAL MEDICINE | Facility: CLINIC | Age: 79
End: 2024-10-24
Payer: MEDICARE

## 2024-10-24 VITALS
HEIGHT: 61 IN | OXYGEN SATURATION: 99 % | BODY MASS INDEX: 28.13 KG/M2 | DIASTOLIC BLOOD PRESSURE: 64 MMHG | HEART RATE: 58 BPM | WEIGHT: 149 LBS | SYSTOLIC BLOOD PRESSURE: 130 MMHG

## 2024-10-24 DIAGNOSIS — R73.02 IMPAIRED GLUCOSE TOLERANCE: ICD-10-CM

## 2024-10-24 DIAGNOSIS — R01.1 HEART MURMUR: ICD-10-CM

## 2024-10-24 DIAGNOSIS — K57.30 SIGMOID DIVERTICULOSIS: ICD-10-CM

## 2024-10-24 DIAGNOSIS — E78.2 MIXED HYPERLIPIDEMIA: ICD-10-CM

## 2024-10-24 DIAGNOSIS — K21.9 GASTROESOPHAGEAL REFLUX DISEASE WITHOUT ESOPHAGITIS: ICD-10-CM

## 2024-10-24 DIAGNOSIS — N18.31 STAGE 3A CHRONIC KIDNEY DISEASE: ICD-10-CM

## 2024-10-24 DIAGNOSIS — I10 PRIMARY HYPERTENSION: Primary | ICD-10-CM

## 2024-10-24 PROCEDURE — 80061 LIPID PANEL: CPT | Performed by: INTERNAL MEDICINE

## 2024-10-24 PROCEDURE — 3078F DIAST BP <80 MM HG: CPT | Performed by: INTERNAL MEDICINE

## 2024-10-24 PROCEDURE — 83036 HEMOGLOBIN GLYCOSYLATED A1C: CPT | Performed by: INTERNAL MEDICINE

## 2024-10-24 PROCEDURE — 84443 ASSAY THYROID STIM HORMONE: CPT | Performed by: INTERNAL MEDICINE

## 2024-10-24 PROCEDURE — 80053 COMPREHEN METABOLIC PANEL: CPT | Performed by: INTERNAL MEDICINE

## 2024-10-24 PROCEDURE — 36415 COLL VENOUS BLD VENIPUNCTURE: CPT | Performed by: INTERNAL MEDICINE

## 2024-10-24 PROCEDURE — 1159F MED LIST DOCD IN RCRD: CPT | Performed by: INTERNAL MEDICINE

## 2024-10-24 PROCEDURE — 1160F RVW MEDS BY RX/DR IN RCRD: CPT | Performed by: INTERNAL MEDICINE

## 2024-10-24 PROCEDURE — 99214 OFFICE O/P EST MOD 30 MIN: CPT | Performed by: INTERNAL MEDICINE

## 2024-10-24 PROCEDURE — 85027 COMPLETE CBC AUTOMATED: CPT | Performed by: INTERNAL MEDICINE

## 2024-10-24 PROCEDURE — 3074F SYST BP LT 130 MM HG: CPT | Performed by: INTERNAL MEDICINE

## 2024-10-24 PROCEDURE — 1126F AMNT PAIN NOTED NONE PRSNT: CPT | Performed by: INTERNAL MEDICINE

## 2024-10-24 NOTE — PROGRESS NOTES
Patient is a 79 y.o. female who is here for a follow up of hyperlipidemia and hypertension.  Chief Complaint   Patient presents with    Hyperlipidemia    Hypertension         HPI:    Here for mgmt of HTN and hyperlipidemia.  Doing well.  No dizziness or lightheadedness.  No HAs.  No abdominal pains.  Appetite is good.  Sleeping fair.  Trying to watch her diet.     History:     Patient Active Problem List   Diagnosis    Gastroesophageal reflux disease without esophagitis    Impaired glucose tolerance    Hyperlipidemia    Hypertension    Osteoporosis    Asteatosis cutis    Cystic lesion of abdominal viscera    Well woman exam    Sigmoid diverticulosis    Heart murmur    Bilateral hip pain    Stage 3a chronic kidney disease    DDD (degenerative disc disease), lumbar       Past Medical History:   Diagnosis Date    Arthritis     GERD (gastroesophageal reflux disease)     Hemorrhoids     Hyperlipidemia     Hypertension     Pre-diabetes        Past Surgical History:   Procedure Laterality Date    HYSTERECTOMY  11/1993    and REG    TOTAL ABDOMINAL HYSTERECTOMY         Current Outpatient Medications on File Prior to Visit   Medication Sig    amLODIPine (NORVASC) 5 MG tablet TAKE 1 TABLET DAILY    aspirin 81 MG tablet Take  by mouth daily.    azelastine (ASTELIN) 0.1 % nasal spray azelastine 137 mcg (0.1 %) nasal spray aerosol   U 1 SPR IEN BID    Calcium-Phosphorus-Vitamin D (CITRACAL +D3 PO) Take  by mouth.    chlorthalidone (HYGROTON) 25 MG tablet Take 1 tablet by mouth Daily.    gabapentin (NEURONTIN) 300 MG capsule Take 1 capsule by mouth 2 (Two) Times a Day.    meclizine (ANTIVERT) 25 MG tablet Take 1/2-1 tablet by mouth 3 times daily as needed for dizziness.    multivitamin with minerals tablet tablet Take 1 tablet by mouth Daily.    Omega-3 1000 MG capsule Take  by mouth.    omeprazole (priLOSEC) 40 MG capsule TAKE 1 CAPSULE EVERY MORNING    rosuvastatin (CRESTOR) 5 MG tablet TAKE 1 TABLET EVERY NIGHT   (LABS IN 6 TO  8 WEEKS)    triamterene-hydrochlorothiazide (Maxzide-25) 37.5-25 MG per tablet Take 1 tablet by mouth Every Morning. Repeat potassium in 1-2 weeks d/c chlorthaldone    [DISCONTINUED] ibandronate (BONIVA) 150 MG tablet Take 1 tablet by mouth. TAKE 1 TABLET EVERY MONTH (SAME DATE) WITH A FULL GLASS OF WATER AND REMAIN IN UPRIGHT POSITION LEAST 1 HOUR (Patient not taking: Reported on 10/24/2024)     No current facility-administered medications on file prior to visit.       Family History   Problem Relation Age of Onset    Diabetes Mother     Hypertension Mother     Kidney disease Mother     Arthritis Mother     Hypertension Sister     Diabetes Brother     Diabetes Other     Kidney disease Other     Hypertension Other        Social History     Socioeconomic History    Marital status:     Number of children: 1    Highest education level: Master's degree (e.g., MA, MS, Cabrera, MEd, MSW, CYN)   Tobacco Use    Smoking status: Never     Passive exposure: Never    Smokeless tobacco: Never   Vaping Use    Vaping status: Never Used   Substance and Sexual Activity    Alcohol use: Not Currently    Drug use: Never    Sexual activity: Yes     Partners: Male         Review of Systems   Constitutional:  Negative for chills, diaphoresis, fatigue and fever.   HENT:  Negative for congestion, ear pain, hearing loss, nosebleeds, postnasal drip, rhinorrhea, sinus pressure and sore throat.    Eyes:  Negative for pain, discharge and itching.   Respiratory:  Negative for cough, chest tightness, shortness of breath and wheezing.    Cardiovascular:  Negative for chest pain, palpitations and leg swelling.   Gastrointestinal:  Negative for abdominal distention, abdominal pain, blood in stool, constipation, diarrhea, nausea and vomiting.        1/13 colonoscopy normal  8/22 colonoscopy with 2 polyps   Endocrine: Negative for polydipsia and polyuria.   Genitourinary:  Negative for difficulty urinating, dysuria, frequency and hematuria.         "3/24 mammogram and followed by GYN   Musculoskeletal:  Positive for arthralgias, back pain and gait problem. Negative for joint swelling and myalgias.        See HPI   Skin:  Negative for rash and wound.   Neurological:  Positive for numbness (LUE). Negative for syncope, weakness and headaches.   Psychiatric/Behavioral:  Negative for dysphoric mood and sleep disturbance. The patient is not nervous/anxious.        /64   Pulse 58   Ht 154.9 cm (60.98\")   Wt 67.6 kg (149 lb)   LMP  (LMP Unknown)   SpO2 99%   BMI 28.17 kg/m²       Physical Exam  Constitutional:       Appearance: Normal appearance. She is well-developed.   HENT:      Head: Normocephalic and atraumatic.      Right Ear: External ear normal.      Left Ear: External ear normal.      Nose: Nose normal.      Mouth/Throat:      Mouth: Mucous membranes are moist.      Pharynx: Oropharynx is clear.   Eyes:      Extraocular Movements: Extraocular movements intact.      Conjunctiva/sclera: Conjunctivae normal.      Pupils: Pupils are equal, round, and reactive to light.   Cardiovascular:      Rate and Rhythm: Normal rate and regular rhythm.      Heart sounds: Murmur (1/6) heard.   Pulmonary:      Effort: Pulmonary effort is normal.      Breath sounds: Normal breath sounds.   Abdominal:      General: Bowel sounds are normal.      Palpations: Abdomen is soft.   Musculoskeletal:         General: Normal range of motion.      Cervical back: Normal range of motion and neck supple.   Lymphadenopathy:      Cervical: No cervical adenopathy.   Skin:     General: Skin is warm and dry.   Neurological:      General: No focal deficit present.      Mental Status: She is alert and oriented to person, place, and time.   Psychiatric:         Mood and Affect: Mood normal.         Behavior: Behavior normal.         Thought Content: Thought content normal.         Procedure:      Historical Data:    htn-cont on CCB and Maxzide, goal of 130/80  hyperlipidemia-labs on Crestor " at goal, counseled on diet  gerd-stable on omeprazole   glucose intolerance-labs improved and advised to watch carbs, AIC today at goal  osteoporosis-DEXA from 7/20 dw patient, on Boniva  xerosis-lachydrin prn, stable  DJD-tylenol prn and diclofenac gel  Diverticulosis-increase fiber  Heart murmur-noted ECHO  CKD-advised NSAIDs avoidance, adequate hydration, labs today stable  Chronic back pain-per NS and ACL      10/24 labs noted and dw patient      Current Outpatient Medications:     amLODIPine (NORVASC) 5 MG tablet, TAKE 1 TABLET DAILY, Disp: 90 tablet, Rfl: 3    aspirin 81 MG tablet, Take  by mouth daily., Disp: , Rfl:     azelastine (ASTELIN) 0.1 % nasal spray, azelastine 137 mcg (0.1 %) nasal spray aerosol  U 1 SPR IEN BID, Disp: , Rfl:     Calcium-Phosphorus-Vitamin D (CITRACAL +D3 PO), Take  by mouth., Disp: , Rfl:     chlorthalidone (HYGROTON) 25 MG tablet, Take 1 tablet by mouth Daily., Disp: , Rfl:     gabapentin (NEURONTIN) 300 MG capsule, Take 1 capsule by mouth 2 (Two) Times a Day., Disp: 60 capsule, Rfl: 2    meclizine (ANTIVERT) 25 MG tablet, Take 1/2-1 tablet by mouth 3 times daily as needed for dizziness., Disp: 30 tablet, Rfl: 2    multivitamin with minerals tablet tablet, Take 1 tablet by mouth Daily., Disp: , Rfl:     Omega-3 1000 MG capsule, Take  by mouth., Disp: , Rfl:     omeprazole (priLOSEC) 40 MG capsule, TAKE 1 CAPSULE EVERY MORNING, Disp: 90 capsule, Rfl: 3    rosuvastatin (CRESTOR) 5 MG tablet, TAKE 1 TABLET EVERY NIGHT   (LABS IN 6 TO 8 WEEKS), Disp: 90 tablet, Rfl: 3    triamterene-hydrochlorothiazide (Maxzide-25) 37.5-25 MG per tablet, Take 1 tablet by mouth Every Morning. Repeat potassium in 1-2 weeks d/c chlorthaldone, Disp: 90 tablet, Rfl: 3        Diagnoses and all orders for this visit:    1. Primary hypertension (Primary)  -     CBC (No Diff)    2. Mixed hyperlipidemia  -     Comprehensive Metabolic Panel  -     Lipid Panel  -     TSH    3. Heart murmur    4. Impaired glucose  tolerance  -     Hemoglobin A1c    5. Sigmoid diverticulosis    6. Gastroesophageal reflux disease without esophagitis    7. Stage 3a chronic kidney disease

## 2024-10-25 LAB
ALBUMIN SERPL-MCNC: 4.4 G/DL (ref 3.5–5.2)
ALBUMIN/GLOB SERPL: 1.5 G/DL
ALP SERPL-CCNC: 66 U/L (ref 39–117)
ALT SERPL W P-5'-P-CCNC: 19 U/L (ref 1–33)
ANION GAP SERPL CALCULATED.3IONS-SCNC: 13 MMOL/L (ref 5–15)
AST SERPL-CCNC: 16 U/L (ref 1–32)
BILIRUB SERPL-MCNC: 0.4 MG/DL (ref 0–1.2)
BUN SERPL-MCNC: 13 MG/DL (ref 8–23)
BUN/CREAT SERPL: 11.6 (ref 7–25)
CALCIUM SPEC-SCNC: 9.7 MG/DL (ref 8.6–10.5)
CHLORIDE SERPL-SCNC: 102 MMOL/L (ref 98–107)
CHOLEST SERPL-MCNC: 164 MG/DL (ref 0–200)
CO2 SERPL-SCNC: 24 MMOL/L (ref 22–29)
CREAT SERPL-MCNC: 1.12 MG/DL (ref 0.57–1)
DEPRECATED RDW RBC AUTO: 43.6 FL (ref 37–54)
EGFRCR SERPLBLD CKD-EPI 2021: 50.8 ML/MIN/1.73
ERYTHROCYTE [DISTWIDTH] IN BLOOD BY AUTOMATED COUNT: 14.8 % (ref 12.3–15.4)
GLOBULIN UR ELPH-MCNC: 2.9 GM/DL
GLUCOSE SERPL-MCNC: 88 MG/DL (ref 65–99)
HBA1C MFR BLD: 6.2 % (ref 4.8–5.6)
HCT VFR BLD AUTO: 42.8 % (ref 34–46.6)
HDLC SERPL-MCNC: 52 MG/DL (ref 40–60)
HGB BLD-MCNC: 13.8 G/DL (ref 12–15.9)
LDLC SERPL CALC-MCNC: 100 MG/DL (ref 0–100)
LDLC/HDLC SERPL: 1.92 {RATIO}
MCH RBC QN AUTO: 26.3 PG (ref 26.6–33)
MCHC RBC AUTO-ENTMCNC: 32.2 G/DL (ref 31.5–35.7)
MCV RBC AUTO: 81.5 FL (ref 79–97)
PLATELET # BLD AUTO: 393 10*3/MM3 (ref 140–450)
PMV BLD AUTO: 8.5 FL (ref 6–12)
POTASSIUM SERPL-SCNC: 3.8 MMOL/L (ref 3.5–5.2)
PROT SERPL-MCNC: 7.3 G/DL (ref 6–8.5)
RBC # BLD AUTO: 5.25 10*6/MM3 (ref 3.77–5.28)
SODIUM SERPL-SCNC: 139 MMOL/L (ref 136–145)
TRIGL SERPL-MCNC: 62 MG/DL (ref 0–150)
TSH SERPL DL<=0.05 MIU/L-ACNC: 1.83 UIU/ML (ref 0.27–4.2)
VLDLC SERPL-MCNC: 12 MG/DL (ref 5–40)
WBC NRBC COR # BLD AUTO: 6.26 10*3/MM3 (ref 3.4–10.8)

## 2024-11-01 DIAGNOSIS — E78.2 MIXED HYPERLIPIDEMIA: ICD-10-CM

## 2024-11-01 RX ORDER — ROSUVASTATIN CALCIUM 5 MG/1
TABLET, COATED ORAL
Qty: 90 TABLET | Refills: 3 | Status: SHIPPED | OUTPATIENT
Start: 2024-11-01

## 2024-11-19 ENCOUNTER — TELEPHONE (OUTPATIENT)
Age: 79
End: 2024-11-19
Payer: MEDICARE

## 2024-11-19 NOTE — TELEPHONE ENCOUNTER
CALLED PT TO REMIND THEM APPT WAS CANCELLED & TO GET THEM RESCHEDULED. MAILED PT LETTER AS WELL.     -HUB OKAY TO SCHEDULE

## 2025-01-06 DIAGNOSIS — I10 PRIMARY HYPERTENSION: ICD-10-CM

## 2025-01-06 RX ORDER — TRIAMTERENE AND HYDROCHLOROTHIAZIDE 37.5; 25 MG/1; MG/1
TABLET ORAL
Qty: 90 TABLET | Refills: 3 | Status: SHIPPED | OUTPATIENT
Start: 2025-01-06

## 2025-02-25 ENCOUNTER — OFFICE VISIT (OUTPATIENT)
Dept: INTERNAL MEDICINE | Facility: CLINIC | Age: 80
End: 2025-02-25
Payer: MEDICARE

## 2025-02-25 ENCOUNTER — LAB (OUTPATIENT)
Dept: INTERNAL MEDICINE | Facility: CLINIC | Age: 80
End: 2025-02-25
Payer: MEDICARE

## 2025-02-25 VITALS
WEIGHT: 147 LBS | BODY MASS INDEX: 27.75 KG/M2 | HEART RATE: 67 BPM | DIASTOLIC BLOOD PRESSURE: 72 MMHG | HEIGHT: 61 IN | OXYGEN SATURATION: 98 % | SYSTOLIC BLOOD PRESSURE: 130 MMHG

## 2025-02-25 DIAGNOSIS — E11.65 TYPE 2 DIABETES MELLITUS WITH HYPERGLYCEMIA, WITHOUT LONG-TERM CURRENT USE OF INSULIN: ICD-10-CM

## 2025-02-25 DIAGNOSIS — I10 PRIMARY HYPERTENSION: Primary | ICD-10-CM

## 2025-02-25 DIAGNOSIS — E78.2 MIXED HYPERLIPIDEMIA: ICD-10-CM

## 2025-02-25 DIAGNOSIS — N18.31 STAGE 3A CHRONIC KIDNEY DISEASE: ICD-10-CM

## 2025-02-25 DIAGNOSIS — K21.9 GASTROESOPHAGEAL REFLUX DISEASE WITHOUT ESOPHAGITIS: ICD-10-CM

## 2025-02-25 DIAGNOSIS — F32.9 REACTIVE DEPRESSION: ICD-10-CM

## 2025-02-25 DIAGNOSIS — K57.30 SIGMOID DIVERTICULOSIS: ICD-10-CM

## 2025-02-25 LAB
ALBUMIN SERPL-MCNC: 3.6 G/DL (ref 3.5–5.2)
ALBUMIN/GLOB SERPL: 1 G/DL
ALP SERPL-CCNC: 57 U/L (ref 39–117)
ALT SERPL W P-5'-P-CCNC: 13 U/L (ref 1–33)
ANION GAP SERPL CALCULATED.3IONS-SCNC: 10.2 MMOL/L (ref 5–15)
AST SERPL-CCNC: 19 U/L (ref 1–32)
BILIRUB BLD-MCNC: NEGATIVE MG/DL
BILIRUB SERPL-MCNC: 0.3 MG/DL (ref 0–1.2)
BUN SERPL-MCNC: 16 MG/DL (ref 8–23)
BUN/CREAT SERPL: 14 (ref 7–25)
CALCIUM SPEC-SCNC: 9.6 MG/DL (ref 8.6–10.5)
CHLORIDE SERPL-SCNC: 106 MMOL/L (ref 98–107)
CHOLEST SERPL-MCNC: 158 MG/DL (ref 0–200)
CLARITY, POC: CLEAR
CO2 SERPL-SCNC: 22.8 MMOL/L (ref 22–29)
COLOR UR: YELLOW
CREAT SERPL-MCNC: 1.14 MG/DL (ref 0.57–1)
DEPRECATED RDW RBC AUTO: 44.9 FL (ref 37–54)
EGFRCR SERPLBLD CKD-EPI 2021: 49.7 ML/MIN/1.73
ERYTHROCYTE [DISTWIDTH] IN BLOOD BY AUTOMATED COUNT: 15 % (ref 12.3–15.4)
EXPIRATION DATE: NORMAL
GLOBULIN UR ELPH-MCNC: 3.5 GM/DL
GLUCOSE SERPL-MCNC: 86 MG/DL (ref 65–99)
GLUCOSE UR STRIP-MCNC: NEGATIVE MG/DL
HCT VFR BLD AUTO: 40.5 % (ref 34–46.6)
HDLC SERPL-MCNC: 52 MG/DL (ref 40–60)
HGB BLD-MCNC: 12.8 G/DL (ref 12–15.9)
KETONES UR QL: NEGATIVE
LDLC SERPL CALC-MCNC: 95 MG/DL (ref 0–100)
LDLC/HDLC SERPL: 1.84 {RATIO}
LEUKOCYTE EST, POC: NEGATIVE
Lab: NORMAL
MCH RBC QN AUTO: 25.9 PG (ref 26.6–33)
MCHC RBC AUTO-ENTMCNC: 31.6 G/DL (ref 31.5–35.7)
MCV RBC AUTO: 82 FL (ref 79–97)
NITRITE UR-MCNC: NEGATIVE MG/ML
PH UR: 6 [PH] (ref 5–8)
PLATELET # BLD AUTO: 362 10*3/MM3 (ref 140–450)
PMV BLD AUTO: 8.9 FL (ref 6–12)
POTASSIUM SERPL-SCNC: 4 MMOL/L (ref 3.5–5.2)
PROT SERPL-MCNC: 7.1 G/DL (ref 6–8.5)
PROT UR STRIP-MCNC: NEGATIVE MG/DL
RBC # BLD AUTO: 4.94 10*6/MM3 (ref 3.77–5.28)
RBC # UR STRIP: NEGATIVE /UL
SODIUM SERPL-SCNC: 139 MMOL/L (ref 136–145)
SP GR UR: 1.01 (ref 1–1.03)
TRIGL SERPL-MCNC: 51 MG/DL (ref 0–150)
TSH SERPL DL<=0.05 MIU/L-ACNC: 1.77 UIU/ML (ref 0.27–4.2)
UROBILINOGEN UR QL: NORMAL
VLDLC SERPL-MCNC: 11 MG/DL (ref 5–40)
WBC NRBC COR # BLD AUTO: 6.87 10*3/MM3 (ref 3.4–10.8)

## 2025-02-25 PROCEDURE — 1159F MED LIST DOCD IN RCRD: CPT | Performed by: INTERNAL MEDICINE

## 2025-02-25 PROCEDURE — 81003 URINALYSIS AUTO W/O SCOPE: CPT | Performed by: INTERNAL MEDICINE

## 2025-02-25 PROCEDURE — 3074F SYST BP LT 130 MM HG: CPT | Performed by: INTERNAL MEDICINE

## 2025-02-25 PROCEDURE — 82043 UR ALBUMIN QUANTITATIVE: CPT | Performed by: INTERNAL MEDICINE

## 2025-02-25 PROCEDURE — 3062F POS MACROALBUMINURIA REV: CPT | Performed by: INTERNAL MEDICINE

## 2025-02-25 PROCEDURE — 3078F DIAST BP <80 MM HG: CPT | Performed by: INTERNAL MEDICINE

## 2025-02-25 PROCEDURE — 80061 LIPID PANEL: CPT | Performed by: INTERNAL MEDICINE

## 2025-02-25 PROCEDURE — 36415 COLL VENOUS BLD VENIPUNCTURE: CPT | Performed by: INTERNAL MEDICINE

## 2025-02-25 PROCEDURE — 1126F AMNT PAIN NOTED NONE PRSNT: CPT | Performed by: INTERNAL MEDICINE

## 2025-02-25 PROCEDURE — 83036 HEMOGLOBIN GLYCOSYLATED A1C: CPT | Performed by: INTERNAL MEDICINE

## 2025-02-25 PROCEDURE — 1160F RVW MEDS BY RX/DR IN RCRD: CPT | Performed by: INTERNAL MEDICINE

## 2025-02-25 PROCEDURE — 82570 ASSAY OF URINE CREATININE: CPT | Performed by: INTERNAL MEDICINE

## 2025-02-25 PROCEDURE — 99214 OFFICE O/P EST MOD 30 MIN: CPT | Performed by: INTERNAL MEDICINE

## 2025-02-25 PROCEDURE — 84443 ASSAY THYROID STIM HORMONE: CPT | Performed by: INTERNAL MEDICINE

## 2025-02-25 PROCEDURE — 85027 COMPLETE CBC AUTOMATED: CPT | Performed by: INTERNAL MEDICINE

## 2025-02-25 PROCEDURE — 80053 COMPREHEN METABOLIC PANEL: CPT | Performed by: INTERNAL MEDICINE

## 2025-02-25 RX ORDER — DULOXETIN HYDROCHLORIDE 30 MG/1
30 CAPSULE, DELAYED RELEASE ORAL DAILY
Qty: 30 CAPSULE | Refills: 5 | Status: SHIPPED | OUTPATIENT
Start: 2025-02-25

## 2025-02-25 NOTE — ASSESSMENT & PLAN NOTE
Dexa scan 9/12/22-  Lumbar spine -1.1, R Total hip -1.1, R femoral neck -2.5.  Risk factors - She uses PPI as needed. Post menopausal , Age > 65    9/22 Normal osteocalcin, normal cbc, CMP normal, PTH normal, Vitamin D 53.4, TSH normal. N-telopeptide normal, SPEP negative.        Plan:    Dexa scan ordered 2/27/25  1000-2000IU of vitamin D 3 gel caps per day.  800-1000MG of Calcium per day. Split dose in am and evening.  Weight bearing exercise.  She has increased renal function.  Reviewed labs.  Consider Prolia  If any dental work she would need to be off medication 3 months before procedure and 3 months after.  RTC 6 mo

## 2025-02-25 NOTE — PROGRESS NOTES
Patient is a 79 y.o. female who is here for a follow up of hyperlipidemia and hypertension.  Chief Complaint   Patient presents with    Hyperlipidemia    Hypertension         HPI:    Here for mgmt of HTN and hyperlipidemia.  Feels down and low motivation.  Feels like she cannot get it together.  No SI.  Thinks she may have some issues with depression.  Does not sleep.  BP has been good.  No fever or chills.  No CP.     History:     Patient Active Problem List   Diagnosis    Gastroesophageal reflux disease without esophagitis    Type 2 diabetes mellitus with hyperglycemia, without long-term current use of insulin    Hyperlipidemia    Hypertension    Osteoporosis    Asteatosis cutis    Cystic lesion of abdominal viscera    Well woman exam    Sigmoid diverticulosis    Heart murmur    Bilateral hip pain    Stage 3a chronic kidney disease    DDD (degenerative disc disease), lumbar    Reactive depression       Past Medical History:   Diagnosis Date    Arthritis     GERD (gastroesophageal reflux disease)     Hemorrhoids     Hyperlipidemia     Hypertension     Pre-diabetes        Past Surgical History:   Procedure Laterality Date    HYSTERECTOMY  11/1993    and REG    TOTAL ABDOMINAL HYSTERECTOMY         Current Outpatient Medications on File Prior to Visit   Medication Sig    amLODIPine (NORVASC) 5 MG tablet TAKE 1 TABLET DAILY    aspirin 81 MG tablet Take  by mouth daily.    azelastine (ASTELIN) 0.1 % nasal spray azelastine 137 mcg (0.1 %) nasal spray aerosol   U 1 SPR IEN BID    Calcium-Phosphorus-Vitamin D (CITRACAL +D3 PO) Take  by mouth.    chlorthalidone (HYGROTON) 25 MG tablet Take 1 tablet by mouth Daily.    gabapentin (NEURONTIN) 300 MG capsule Take 1 capsule by mouth 2 (Two) Times a Day.    meclizine (ANTIVERT) 25 MG tablet Take 1/2-1 tablet by mouth 3 times daily as needed for dizziness.    multivitamin with minerals tablet tablet Take 1 tablet by mouth Daily.    Omega-3 1000 MG capsule Take  by mouth.     omeprazole (priLOSEC) 40 MG capsule TAKE 1 CAPSULE EVERY MORNING    rosuvastatin (CRESTOR) 5 MG tablet TAKE 1 TABLET EVERY NIGHT   (LABS IN 6 TO 8 WEEKS)    triamterene-hydrochlorothiazide (MAXZIDE-25) 37.5-25 MG per tablet TAKE 1 TABLET EVERY MORNING, REPEAT POTASSIUM IN 1 TO 2 WEEKS (DISCONTINUE CHLORTHALIDONE)     No current facility-administered medications on file prior to visit.       Family History   Problem Relation Age of Onset    Diabetes Mother     Hypertension Mother     Kidney disease Mother     Arthritis Mother     Hypertension Sister     Diabetes Brother     Diabetes Other     Kidney disease Other     Hypertension Other        Social History     Socioeconomic History    Marital status:     Number of children: 1    Highest education level: Master's degree (e.g., MA, MS, Cabrera, MEd, MSW, CYN)   Tobacco Use    Smoking status: Never     Passive exposure: Never    Smokeless tobacco: Never   Vaping Use    Vaping status: Never Used   Substance and Sexual Activity    Alcohol use: Not Currently    Drug use: Never    Sexual activity: Yes     Partners: Male         Review of Systems   Constitutional:  Negative for chills, diaphoresis, fatigue and fever.   HENT:  Negative for congestion, ear pain, hearing loss, nosebleeds, postnasal drip, rhinorrhea, sinus pressure and sore throat.    Eyes:  Negative for pain, discharge and itching.   Respiratory:  Negative for cough, chest tightness, shortness of breath and wheezing.    Cardiovascular:  Negative for chest pain, palpitations and leg swelling.   Gastrointestinal:  Negative for abdominal distention, abdominal pain, blood in stool, constipation, diarrhea, nausea and vomiting.        1/13 colonoscopy normal  8/22 colonoscopy with 2 polyps   Endocrine: Negative for polydipsia and polyuria.   Genitourinary:  Negative for difficulty urinating, dysuria, frequency and hematuria.        3/24 mammogram and followed by GYN   Musculoskeletal:  Positive for arthralgias,  "back pain and gait problem. Negative for joint swelling and myalgias.        See HPI   Skin:  Negative for rash and wound.   Neurological:  Positive for numbness (LUE). Negative for syncope, weakness and headaches.   Psychiatric/Behavioral:  Positive for dysphoric mood. Negative for sleep disturbance. The patient is not nervous/anxious.        /72   Pulse 67   Ht 154.9 cm (60.98\")   Wt 66.7 kg (147 lb)   LMP  (LMP Unknown)   SpO2 98%   BMI 27.79 kg/m²       Physical Exam  Constitutional:       Appearance: Normal appearance. She is well-developed.   HENT:      Head: Normocephalic and atraumatic.      Right Ear: External ear normal.      Left Ear: External ear normal.      Nose: Nose normal.      Mouth/Throat:      Mouth: Mucous membranes are moist.      Pharynx: Oropharynx is clear.   Eyes:      Extraocular Movements: Extraocular movements intact.      Conjunctiva/sclera: Conjunctivae normal.      Pupils: Pupils are equal, round, and reactive to light.   Cardiovascular:      Rate and Rhythm: Normal rate and regular rhythm.      Heart sounds: Murmur (1/6) heard.   Pulmonary:      Effort: Pulmonary effort is normal.      Breath sounds: Normal breath sounds.   Abdominal:      General: Bowel sounds are normal.      Palpations: Abdomen is soft.   Musculoskeletal:         General: Normal range of motion.      Cervical back: Normal range of motion and neck supple.   Lymphadenopathy:      Cervical: No cervical adenopathy.   Skin:     General: Skin is warm and dry.   Neurological:      General: No focal deficit present.      Mental Status: She is alert and oriented to person, place, and time.   Psychiatric:         Mood and Affect: Mood normal.         Behavior: Behavior normal.         Thought Content: Thought content normal.         Procedure:      Historical Data:    htn-cont on CCB and Maxzide, goal of 130/80  hyperlipidemia-labs on Crestor at goal , counseled on diet  gerd-stable on omeprazole   glucose " intolerance-labs improved and advised to watch carbs, AIC today on target   osteoporosis-DEXA from 7/20 dw patient, on Boniva  xerosis-lachydrin prn, stable  DJD-tylenol prn and diclofenac gel  Diverticulosis-increase fiber  Heart murmur-noted ECHO  CKD-advised NSAIDs avoidance, adequate hydration, labs today stable  Depression-start Cymbalta  Chronic back pain-per NS and ACL      2/25 labs noted and dw patient    Current Outpatient Medications:     amLODIPine (NORVASC) 5 MG tablet, TAKE 1 TABLET DAILY, Disp: 90 tablet, Rfl: 3    aspirin 81 MG tablet, Take  by mouth daily., Disp: , Rfl:     azelastine (ASTELIN) 0.1 % nasal spray, azelastine 137 mcg (0.1 %) nasal spray aerosol  U 1 SPR IEN BID, Disp: , Rfl:     Calcium-Phosphorus-Vitamin D (CITRACAL +D3 PO), Take  by mouth., Disp: , Rfl:     chlorthalidone (HYGROTON) 25 MG tablet, Take 1 tablet by mouth Daily., Disp: , Rfl:     gabapentin (NEURONTIN) 300 MG capsule, Take 1 capsule by mouth 2 (Two) Times a Day., Disp: 60 capsule, Rfl: 2    meclizine (ANTIVERT) 25 MG tablet, Take 1/2-1 tablet by mouth 3 times daily as needed for dizziness., Disp: 30 tablet, Rfl: 2    multivitamin with minerals tablet tablet, Take 1 tablet by mouth Daily., Disp: , Rfl:     Omega-3 1000 MG capsule, Take  by mouth., Disp: , Rfl:     omeprazole (priLOSEC) 40 MG capsule, TAKE 1 CAPSULE EVERY MORNING, Disp: 90 capsule, Rfl: 3    rosuvastatin (CRESTOR) 5 MG tablet, TAKE 1 TABLET EVERY NIGHT   (LABS IN 6 TO 8 WEEKS), Disp: 90 tablet, Rfl: 3    triamterene-hydrochlorothiazide (MAXZIDE-25) 37.5-25 MG per tablet, TAKE 1 TABLET EVERY MORNING, REPEAT POTASSIUM IN 1 TO 2 WEEKS (DISCONTINUE CHLORTHALIDONE), Disp: 90 tablet, Rfl: 3    DULoxetine (CYMBALTA) 30 MG capsule, Take 1 capsule by mouth Daily., Disp: 30 capsule, Rfl: 5        Diagnoses and all orders for this visit:    1. Primary hypertension (Primary)  -     CBC (No Diff)    2. Mixed hyperlipidemia  -     Comprehensive Metabolic Panel  -      Lipid Panel  -     TSH    3. Type 2 diabetes mellitus with hyperglycemia, without long-term current use of insulin  -     Hemoglobin A1c  -     Microalbumin / Creatinine Urine Ratio - Urine, Clean Catch  -     POC Urinalysis Dipstick, Automated    4. Sigmoid diverticulosis    5. Gastroesophageal reflux disease without esophagitis    6. Stage 3a chronic kidney disease    7. Reactive depression  -     DULoxetine (CYMBALTA) 30 MG capsule; Take 1 capsule by mouth Daily.  Dispense: 30 capsule; Refill: 5

## 2025-02-25 NOTE — ASSESSMENT & PLAN NOTE
Stage 3A  Mother and brother had renal disease.  2/23 Cr 1.06/54.9  6/23 Cr 1.03/57  11/3/23 cr 1.02/57 4/24 1.18/48      Plan:    PCP has stopped her meloxicam and told her to avoid all NSAIDs. She does have pre-diabetes and HTN.  She may need to stop Ibandronate in future but everything currently is stable to improved.  Encourage her to stay well hydrated.  Recheck labs in 3 months.  I do think with her GFR being 48 she needs to d/c ibandronate and Consider Prolia after DEXA

## 2025-02-26 LAB
ALBUMIN UR-MCNC: <1.2 MG/DL
CREAT UR-MCNC: 48.6 MG/DL
HBA1C MFR BLD: 6.2 % (ref 4.8–5.6)
MICROALBUMIN/CREAT UR: NORMAL MG/G{CREAT}

## 2025-02-27 ENCOUNTER — OFFICE VISIT (OUTPATIENT)
Age: 80
End: 2025-02-27
Payer: MEDICARE

## 2025-02-27 VITALS
HEIGHT: 60 IN | TEMPERATURE: 97.3 F | WEIGHT: 145.6 LBS | DIASTOLIC BLOOD PRESSURE: 80 MMHG | HEART RATE: 77 BPM | SYSTOLIC BLOOD PRESSURE: 130 MMHG | BODY MASS INDEX: 28.58 KG/M2

## 2025-02-27 DIAGNOSIS — M81.0 AGE-RELATED OSTEOPOROSIS WITHOUT CURRENT PATHOLOGICAL FRACTURE: Primary | ICD-10-CM

## 2025-02-27 DIAGNOSIS — N18.31 STAGE 3A CHRONIC KIDNEY DISEASE: ICD-10-CM

## 2025-02-27 NOTE — PROGRESS NOTES
Office Visit       Date: 02/27/2025   Patient Name: Marleni Armando  MRN: [id number]  YOB: 1945    Referring Physician: No ref. provider found     Chief Complaint:   Chief Complaint   Patient presents with    Follow-up    Osteoporosis       History of Present Illness: Marleni Armando is a 79 y.o. female who is here today for follow-up of osteoporosis.  Today she reports feeling the same.  She rates her pain 1 out of 10 and has 20 minutes of morning stiffness.  She is a former Dr. Licona patient.      Subjective   Review of Systems:  Positive for GERD and depressed mood.  All other review of symptoms are negative.    Past Medical History:   Past Medical History:   Diagnosis Date    Arthritis     GERD (gastroesophageal reflux disease)     Hemorrhoids     Hyperlipidemia     Hypertension     Pre-diabetes        Past Surgical History:   Past Surgical History:   Procedure Laterality Date    HYSTERECTOMY  11/1993    and REG    TOTAL ABDOMINAL HYSTERECTOMY         Family History:   Family History   Problem Relation Age of Onset    Diabetes Mother     Hypertension Mother     Kidney disease Mother     Arthritis Mother     Hypertension Sister     Diabetes Brother     Diabetes Other     Kidney disease Other     Hypertension Other        Social History:   Social History     Socioeconomic History    Marital status:     Number of children: 1    Highest education level: Master's degree (e.g., MA, MS, Cabrera, MEd, MSW, CYN)   Tobacco Use    Smoking status: Never     Passive exposure: Never    Smokeless tobacco: Never   Vaping Use    Vaping status: Never Used   Substance and Sexual Activity    Alcohol use: Not Currently    Drug use: Never    Sexual activity: Yes     Partners: Male       Medications:   Current Outpatient Medications:     amLODIPine (NORVASC) 5 MG tablet, TAKE 1 TABLET DAILY, Disp: 90 tablet, Rfl: 3    aspirin 81 MG tablet, Take  by mouth daily., Disp: , Rfl:     azelastine (ASTELIN)  "0.1 % nasal spray, azelastine 137 mcg (0.1 %) nasal spray aerosol  U 1 SPR IEN BID, Disp: , Rfl:     Calcium-Phosphorus-Vitamin D (CITRACAL +D3 PO), Take  by mouth., Disp: , Rfl:     chlorthalidone (HYGROTON) 25 MG tablet, Take 1 tablet by mouth Daily., Disp: , Rfl:     DULoxetine (CYMBALTA) 30 MG capsule, Take 1 capsule by mouth Daily., Disp: 30 capsule, Rfl: 5    gabapentin (NEURONTIN) 300 MG capsule, Take 1 capsule by mouth 2 (Two) Times a Day., Disp: 60 capsule, Rfl: 2    meclizine (ANTIVERT) 25 MG tablet, Take 1/2-1 tablet by mouth 3 times daily as needed for dizziness., Disp: 30 tablet, Rfl: 2    multivitamin with minerals tablet tablet, Take 1 tablet by mouth Daily., Disp: , Rfl:     Omega-3 1000 MG capsule, Take  by mouth., Disp: , Rfl:     omeprazole (priLOSEC) 40 MG capsule, TAKE 1 CAPSULE EVERY MORNING, Disp: 90 capsule, Rfl: 3    rosuvastatin (CRESTOR) 5 MG tablet, TAKE 1 TABLET EVERY NIGHT   (LABS IN 6 TO 8 WEEKS), Disp: 90 tablet, Rfl: 3    triamterene-hydrochlorothiazide (MAXZIDE-25) 37.5-25 MG per tablet, TAKE 1 TABLET EVERY MORNING, REPEAT POTASSIUM IN 1 TO 2 WEEKS (DISCONTINUE CHLORTHALIDONE), Disp: 90 tablet, Rfl: 3    Allergies:   Allergies   Allergen Reactions    Codeine Nausea Only     Pt advised been long time ago, she recalls being sick to the stomach and dizzy.       I have reviewed and updated the patient's chief complaint, history of present illness, review of systems, past medical history, surgical history, family history, social history, medications and allergy list as appropriate.     Objective    Vital Signs:   Vitals:    02/27/25 0920   BP: 130/80   Pulse: 77   Temp: 97.3 °F (36.3 °C)   Weight: 66 kg (145 lb 9.6 oz)   Height: 152.4 cm (60\")   PainSc: 1      Body mass index is 28.44 kg/m².           Physical Exam:  Physical Exam  Vitals reviewed.   Constitutional:       Appearance: Normal appearance.   HENT:      Head: Normocephalic and atraumatic.      Mouth/Throat:      Mouth: Mucous " membranes are moist.   Eyes:      Conjunctiva/sclera: Conjunctivae normal.   Cardiovascular:      Rate and Rhythm: Normal rate and regular rhythm.      Pulses: Normal pulses.      Heart sounds: Normal heart sounds.   Pulmonary:      Effort: Pulmonary effort is normal.      Breath sounds: Normal breath sounds.   Musculoskeletal:         General: Normal range of motion.      Cervical back: Normal range of motion and neck supple.      Comments: No soft tissue swelling  Crepitus left knee  [place] bilaterally   Skin:     General: Skin is warm and dry.   Neurological:      General: No focal deficit present.      Mental Status: She is alert and oriented to person, place, and time. Mental status is at baseline.   Psychiatric:         Mood and Affect: Mood normal.         Behavior: Behavior normal.         Thought Content: Thought content normal.         Judgment: Judgment normal.          Results Review:   Imaging Results (Last 24 Hours)       ** No results found for the last 24 hours. **            Procedures    Assessment / Plan    Assessment/Plan:   Diagnoses and all orders for this visit:    1. Age-related osteoporosis without current pathological fracture (Primary)  Assessment & Plan:  Dexa scan 9/12/22-  Lumbar spine -1.1, R Total hip -1.1, R femoral neck -2.5.  Risk factors - She uses PPI as needed. Post menopausal , Age > 65    9/22 Normal osteocalcin, normal cbc, CMP normal, PTH normal, Vitamin D 53.4, TSH normal. N-telopeptide normal, SPEP negative.        Plan:    Dexa scan ordered 2/27/25  1000-2000IU of vitamin D 3 gel caps per day.  800-1000MG of Calcium per day. Split dose in am and evening.  Weight bearing exercise.  She has increased renal function.  Reviewed labs.  Consider Prolia  If any dental work she would need to be off medication 3 months before procedure and 3 months after.  RTC 6 mo    Orders:  -     Protein Elec + Interp, Serum; Future  -     PTH, Intact; Future  -     Vitamin D,25-Hydroxy;  Future  -     Comprehensive Metabolic Panel; Future  -     DEXA Bone Density Axial; Future    2. Stage 3a chronic kidney disease  Assessment & Plan:  Stage 3A  Mother and brother had renal disease.  2/23 Cr 1.06/54.9  6/23 Cr 1.03/57  11/3/23 cr 1.02/57 4/24 1.18/48      Plan:    PCP has stopped her meloxicam and told her to avoid all NSAIDs. She does have pre-diabetes and HTN.  She may need to stop Ibandronate in future but everything currently is stable to improved.  Encourage her to stay well hydrated.  Recheck labs in 3 months.  I do think with her GFR being 48 she needs to d/c ibandronate and Consider Prolia after DEXA            Follow Up:   Return in about 6 months (around 8/27/2025) for SOPHIA Tang.        SOPHIA Palomo  [facility]

## 2025-04-18 ENCOUNTER — HOSPITAL ENCOUNTER (OUTPATIENT)
Dept: BONE DENSITY | Facility: HOSPITAL | Age: 80
Discharge: HOME | End: 2025-04-18
Payer: MEDICARE

## 2025-04-18 DIAGNOSIS — M81.0 AGE-RELATED OSTEOPOROSIS WITHOUT CURRENT PATHOLOGICAL FRACTURE: ICD-10-CM

## 2025-04-18 PROCEDURE — 77080 DXA BONE DENSITY AXIAL: CPT

## 2025-07-08 ENCOUNTER — OFFICE VISIT (OUTPATIENT)
Dept: INTERNAL MEDICINE | Age: 80
End: 2025-07-08
Payer: MEDICARE

## 2025-07-08 ENCOUNTER — LAB (OUTPATIENT)
Dept: INTERNAL MEDICINE | Age: 80
End: 2025-07-08
Payer: MEDICARE

## 2025-07-08 VITALS
HEART RATE: 61 BPM | SYSTOLIC BLOOD PRESSURE: 130 MMHG | BODY MASS INDEX: 29.06 KG/M2 | WEIGHT: 148 LBS | OXYGEN SATURATION: 100 % | DIASTOLIC BLOOD PRESSURE: 64 MMHG | HEIGHT: 60 IN

## 2025-07-08 DIAGNOSIS — K57.30 SIGMOID DIVERTICULOSIS: ICD-10-CM

## 2025-07-08 DIAGNOSIS — F32.9 REACTIVE DEPRESSION: ICD-10-CM

## 2025-07-08 DIAGNOSIS — E78.2 MIXED HYPERLIPIDEMIA: ICD-10-CM

## 2025-07-08 DIAGNOSIS — Z00.00 HEALTHCARE MAINTENANCE: Primary | ICD-10-CM

## 2025-07-08 DIAGNOSIS — I10 PRIMARY HYPERTENSION: Primary | ICD-10-CM

## 2025-07-08 DIAGNOSIS — K21.9 GASTROESOPHAGEAL REFLUX DISEASE WITHOUT ESOPHAGITIS: ICD-10-CM

## 2025-07-08 DIAGNOSIS — Z00.00 ROUTINE GENERAL MEDICAL EXAMINATION AT A HEALTH CARE FACILITY: ICD-10-CM

## 2025-07-08 DIAGNOSIS — N18.31 STAGE 3A CHRONIC KIDNEY DISEASE: ICD-10-CM

## 2025-07-08 DIAGNOSIS — E11.65 TYPE 2 DIABETES MELLITUS WITH HYPERGLYCEMIA, WITHOUT LONG-TERM CURRENT USE OF INSULIN: ICD-10-CM

## 2025-07-08 DIAGNOSIS — Z00.00 ENCOUNTER FOR MEDICARE ANNUAL WELLNESS EXAM: ICD-10-CM

## 2025-07-08 LAB
ALBUMIN SERPL-MCNC: 4.3 G/DL (ref 3.5–5.2)
ALBUMIN/GLOB SERPL: 1.3 G/DL
ALP SERPL-CCNC: 63 U/L (ref 39–117)
ALT SERPL W P-5'-P-CCNC: 16 U/L (ref 1–33)
ANION GAP SERPL CALCULATED.3IONS-SCNC: 14 MMOL/L (ref 5–15)
AST SERPL-CCNC: 21 U/L (ref 1–32)
BILIRUB SERPL-MCNC: 0.3 MG/DL (ref 0–1.2)
BUN SERPL-MCNC: 14 MG/DL (ref 8–23)
BUN/CREAT SERPL: 12.1 (ref 7–25)
CALCIUM SPEC-SCNC: 9.5 MG/DL (ref 8.6–10.5)
CHLORIDE SERPL-SCNC: 105 MMOL/L (ref 98–107)
CHOLEST SERPL-MCNC: 164 MG/DL (ref 0–200)
CO2 SERPL-SCNC: 21 MMOL/L (ref 22–29)
CREAT SERPL-MCNC: 1.16 MG/DL (ref 0.57–1)
DEPRECATED RDW RBC AUTO: 42.8 FL (ref 37–54)
EGFRCR SERPLBLD CKD-EPI 2021: 48.4 ML/MIN/1.73
ERYTHROCYTE [DISTWIDTH] IN BLOOD BY AUTOMATED COUNT: 14.5 % (ref 12.3–15.4)
GLOBULIN UR ELPH-MCNC: 3.3 GM/DL
GLUCOSE SERPL-MCNC: 83 MG/DL (ref 65–99)
HCT VFR BLD AUTO: 42 % (ref 34–46.6)
HDLC SERPL-MCNC: 53 MG/DL (ref 40–60)
HGB BLD-MCNC: 13.7 G/DL (ref 12–15.9)
LDLC SERPL CALC-MCNC: 99 MG/DL (ref 0–100)
LDLC/HDLC SERPL: 1.87 {RATIO}
MCH RBC QN AUTO: 26.7 PG (ref 26.6–33)
MCHC RBC AUTO-ENTMCNC: 32.6 G/DL (ref 31.5–35.7)
MCV RBC AUTO: 81.9 FL (ref 79–97)
PLATELET # BLD AUTO: 366 10*3/MM3 (ref 140–450)
PMV BLD AUTO: 8.7 FL (ref 6–12)
POTASSIUM SERPL-SCNC: 4 MMOL/L (ref 3.5–5.2)
PROT SERPL-MCNC: 7.6 G/DL (ref 6–8.5)
RBC # BLD AUTO: 5.13 10*6/MM3 (ref 3.77–5.28)
SODIUM SERPL-SCNC: 140 MMOL/L (ref 136–145)
TRIGL SERPL-MCNC: 59 MG/DL (ref 0–150)
TSH SERPL DL<=0.05 MIU/L-ACNC: 1.63 UIU/ML (ref 0.27–4.2)
VIT B12 BLD-MCNC: 1398 PG/ML (ref 211–946)
VLDLC SERPL-MCNC: 12 MG/DL (ref 5–40)
WBC NRBC COR # BLD AUTO: 6.3 10*3/MM3 (ref 3.4–10.8)

## 2025-07-08 PROCEDURE — 1126F AMNT PAIN NOTED NONE PRSNT: CPT | Performed by: INTERNAL MEDICINE

## 2025-07-08 PROCEDURE — G0439 PPPS, SUBSEQ VISIT: HCPCS | Performed by: INTERNAL MEDICINE

## 2025-07-08 PROCEDURE — 80061 LIPID PANEL: CPT | Performed by: INTERNAL MEDICINE

## 2025-07-08 PROCEDURE — 85027 COMPLETE CBC AUTOMATED: CPT | Performed by: INTERNAL MEDICINE

## 2025-07-08 PROCEDURE — 99397 PER PM REEVAL EST PAT 65+ YR: CPT | Performed by: INTERNAL MEDICINE

## 2025-07-08 PROCEDURE — 3074F SYST BP LT 130 MM HG: CPT | Performed by: INTERNAL MEDICINE

## 2025-07-08 PROCEDURE — 83036 HEMOGLOBIN GLYCOSYLATED A1C: CPT | Performed by: INTERNAL MEDICINE

## 2025-07-08 PROCEDURE — 80053 COMPREHEN METABOLIC PANEL: CPT | Performed by: INTERNAL MEDICINE

## 2025-07-08 PROCEDURE — 3078F DIAST BP <80 MM HG: CPT | Performed by: INTERNAL MEDICINE

## 2025-07-08 PROCEDURE — 1170F FXNL STATUS ASSESSED: CPT | Performed by: INTERNAL MEDICINE

## 2025-07-08 PROCEDURE — 82607 VITAMIN B-12: CPT | Performed by: INTERNAL MEDICINE

## 2025-07-08 PROCEDURE — 84443 ASSAY THYROID STIM HORMONE: CPT | Performed by: INTERNAL MEDICINE

## 2025-07-08 PROCEDURE — 36415 COLL VENOUS BLD VENIPUNCTURE: CPT | Performed by: INTERNAL MEDICINE

## 2025-07-08 NOTE — PROGRESS NOTES
The ABCs of the Annual Wellness Visit  Subsequent Medicare Wellness Visit    Chief Complaint   Patient presents with    Annual Exam      Subjective    History of Present Illness:  Marleni Armando is a 80 y.o. female who presents for a Subsequent Medicare Wellness Visit.    The following portions of the patient's history were reviewed and   updated as appropriate: current medications, past family history, past medical history, past social history, past surgical history, and problem list.     Compared to one year ago, the patient feels her physical   health is better.    Compared to one year ago, the patient feels her mental   health is better.    Recent Hospitalizations:  She was not admitted to the hospital during the last year.       Current Medical Providers:  Patient Care Team:  Edison Herring MD as PCP - General  Rosa Licona MD as Consulting Physician (Rheumatology)  Yefri Sheikh MD as Consulting Physician (Pain Medicine)  Georgina Lind APRN as Nurse Practitioner (Rheumatology)  Gokul Walden MD as Surgeon (Neurosurgery)  Taina Mack CNM as Midwife (Certified Nurse Midwife)    Outpatient Medications Prior to Visit   Medication Sig Dispense Refill    amLODIPine (NORVASC) 5 MG tablet TAKE 1 TABLET DAILY 90 tablet 3    aspirin 81 MG tablet Take  by mouth daily.      azelastine (ASTELIN) 0.1 % nasal spray azelastine 137 mcg (0.1 %) nasal spray aerosol   U 1 SPR IEN BID      Calcium-Phosphorus-Vitamin D (CITRACAL +D3 PO) Take  by mouth.      chlorthalidone (HYGROTON) 25 MG tablet Take 1 tablet by mouth Daily.      gabapentin (NEURONTIN) 300 MG capsule Take 1 capsule by mouth 2 (Two) Times a Day. 60 capsule 2    meclizine (ANTIVERT) 25 MG tablet Take 1/2-1 tablet by mouth 3 times daily as needed for dizziness. 30 tablet 2    multivitamin with minerals tablet tablet Take 1 tablet by mouth Daily.      Omega-3 1000 MG capsule Take  by mouth.      omeprazole (priLOSEC) 40 MG capsule TAKE 1  CAPSULE EVERY MORNING 90 capsule 3    rosuvastatin (CRESTOR) 5 MG tablet TAKE 1 TABLET EVERY NIGHT   (LABS IN 6 TO 8 WEEKS) 90 tablet 3    triamterene-hydrochlorothiazide (MAXZIDE-25) 37.5-25 MG per tablet TAKE 1 TABLET EVERY MORNING, REPEAT POTASSIUM IN 1 TO 2 WEEKS (DISCONTINUE CHLORTHALIDONE) 90 tablet 3    DULoxetine (CYMBALTA) 30 MG capsule Take 1 capsule by mouth Daily. (Patient not taking: Reported on 7/8/2025) 30 capsule 5     No facility-administered medications prior to visit.       No opioid medication identified on active medication list. I have reviewed chart for other potential  high risk medication/s and harmful drug interactions in the elderly.        Aspirin is on active medication list. Aspirin use is indicated based on review of current medical condition/s. Pros and cons of this therapy have been discussed today. Benefits of this medication outweigh potential harm.  Patient has been encouraged to continue taking this medication.  .    Fall Risk Assessment was completed, and patient is at low risk for falls.      Patient Active Problem List   Diagnosis    Gastroesophageal reflux disease without esophagitis    Type 2 diabetes mellitus with hyperglycemia, without long-term current use of insulin    Hyperlipidemia    Hypertension    Osteoporosis    Asteatosis cutis    Cystic lesion of abdominal viscera    Well woman exam    Sigmoid diverticulosis    Heart murmur    Bilateral hip pain    Stage 3a chronic kidney disease    DDD (degenerative disc disease), lumbar    Reactive depression     Advance Care Planning   Advance Directive is not on file.  ACP discussion was held with the patient during this visit. Patient does not have an advance directive, information provided.    Review of Systems   Constitutional:  Negative for chills, diaphoresis, fatigue and fever.   HENT:  Negative for congestion, ear pain, hearing loss, nosebleeds, postnasal drip, rhinorrhea, sinus pressure and sore throat.    Eyes:   "Negative for pain, discharge and itching.   Respiratory:  Negative for cough, chest tightness, shortness of breath and wheezing.    Cardiovascular:  Negative for chest pain, palpitations and leg swelling.   Gastrointestinal:  Negative for abdominal distention, abdominal pain, blood in stool, constipation, diarrhea, nausea and vomiting.        1/13 colonoscopy normal  8/22 colonoscopy with 2 polyps   Endocrine: Negative for polydipsia and polyuria.   Genitourinary:  Negative for difficulty urinating, dysuria, frequency and hematuria.        3/24 mammogram and followed by GYN   Musculoskeletal:  Positive for arthralgias, back pain and gait problem. Negative for joint swelling and myalgias.        See HPI   Skin:  Negative for rash and wound.   Neurological:  Negative for syncope, weakness and headaches.   Psychiatric/Behavioral:  Positive for dysphoric mood. Negative for sleep disturbance. The patient is not nervous/anxious.          Objective       Vitals:    07/08/25 1100 07/08/25 1131   BP: 124/78 130/64   BP Location: Left arm    Patient Position: Sitting    Pulse: 61    SpO2: 100%    Weight: 67.1 kg (148 lb)    Height: 152.4 cm (60\")    PainSc: 0-No pain      BMI Readings from Last 1 Encounters:   07/08/25 28.90 kg/m²   BMI is within normal parameters. No follow-up required.  BMI Readings from Last 1 Encounters:   07/08/25 28.90 kg/m²   BMI is above normal parameters. Recommendations include: exercise counseling and nutrition counseling    Does the patient have evidence of cognitive impairment? No    Physical Exam  Constitutional:       Appearance: Normal appearance. She is well-developed.   HENT:      Head: Normocephalic and atraumatic.      Right Ear: External ear normal.      Left Ear: External ear normal.      Nose: Nose normal.      Mouth/Throat:      Mouth: Mucous membranes are moist.      Pharynx: Oropharynx is clear.   Eyes:      Extraocular Movements: Extraocular movements intact.      " Conjunctiva/sclera: Conjunctivae normal.      Pupils: Pupils are equal, round, and reactive to light.   Cardiovascular:      Rate and Rhythm: Normal rate and regular rhythm.      Heart sounds: Murmur (1/6) heard.   Pulmonary:      Effort: Pulmonary effort is normal.      Breath sounds: Normal breath sounds.   Abdominal:      General: Bowel sounds are normal.      Palpations: Abdomen is soft.   Musculoskeletal:         General: Normal range of motion.      Cervical back: Normal range of motion and neck supple.   Lymphadenopathy:      Cervical: No cervical adenopathy.   Skin:     General: Skin is warm and dry.   Neurological:      General: No focal deficit present.      Mental Status: She is alert and oriented to person, place, and time.   Psychiatric:         Mood and Affect: Mood normal.         Behavior: Behavior normal.         Thought Content: Thought content normal.       Lab Results   Component Value Date    TRIG 59 2025    HDL 53 2025    LDL 99 2025    VLDL 12 2025    HGBA1C 6.40 (H) 2025            HEALTH RISK ASSESSMENT    Smoking Status:  Social History     Tobacco Use   Smoking Status Never    Passive exposure: Never   Smokeless Tobacco Never     Alcohol Consumption:  Social History     Substance and Sexual Activity   Alcohol Use Not Currently     Fall Risk Screen:    STEADI Fall Risk Assessment was completed, and patient is at LOW risk for falls.Assessment completed on:2025    Depression Screenin/8/2025    11:00 AM   PHQ-2/PHQ-9 Depression Screening   Little interest or pleasure in doing things Several days   Feeling down, depressed, or hopeless Not at all       Health Habits and Functional and Cognitive Screenin/8/2025    11:00 AM   Functional & Cognitive Status   Do you have difficulty preparing food and eating? No   Do you have difficulty bathing yourself, getting dressed or grooming yourself? No   Do you have difficulty using the toilet? No   Do you  have difficulty moving around from place to place? No   Do you have trouble with steps or getting out of a bed or a chair? No   Current Diet Well Balanced Diet   Dental Exam Up to date   Eye Exam Up to date   Exercise (times per week) 2 times per week   Current Exercises Include Other   Do you need help using the phone?  No   Are you deaf or do you have serious difficulty hearing?  No   Do you need help to go to places out of walking distance? No   Do you need help shopping? No   Do you need help preparing meals?  No   Do you need help with housework?  No   Do you need help with laundry? No   Do you need help taking your medications? No   Do you need help managing money? No   Do you ever drive or ride in a car without wearing a seat belt? No   Have you felt unusual fatigue (could be tiredness), stress, anger or loneliness in the last month? No   Who do you live with? Spouse   If you need help, do you have trouble finding someone available to you? No   Have you been bothered in the last four weeks by sexual problems? No   Do you have difficulty concentrating, remembering or making decisions? No       Age-appropriate Screening Schedule:  Refer to the list below for future screening recommendations based on patient's age, sex and/or medical conditions. Orders for these recommended tests are listed in the plan section. The patient has been provided with a written plan.    Health Maintenance   Topic Date Due    DIABETIC FOOT EXAM  Never done    DIABETIC EYE EXAM  Never done    TDAP/TD VACCINES (2 - Tdap) 10/21/2008    HEPATITIS C SCREENING  Never done    RSV Vaccine - Adults (1 - 1-dose 75+ series) Never done    COVID-19 Vaccine (7 - 2024-25 season) 04/06/2025    INFLUENZA VACCINE  10/01/2025    HEMOGLOBIN A1C  01/08/2026    URINE MICROALBUMIN-CREATININE RATIO (uACR)  02/26/2026    ANNUAL WELLNESS VISIT  07/08/2026    LIPID PANEL  07/08/2026    DXA SCAN  04/18/2027    COLORECTAL CANCER SCREENING  08/01/2027     Pneumococcal Vaccine 50+  Completed    MAMMOGRAM  Discontinued    ZOSTER VACCINE  Discontinued              Assessment & Plan     CMS Preventative Services Quick Reference  Risk Factors Identified During Encounter  Immunizations Discussed/Encouraged: RSV (Respiratory Syncytial Virus)  Inactivity/Sedentary: Patient was advised to exercise at least 150 minutes a week per CDC recommendations.  Polypharmacy: Medication List reviewed and Medications are appropriate for patient  The above risks/problems have been discussed with the patient.  Follow up actions/plans if indicated are seen below in the Assessment/Plan Section.  Pertinent information has been shared with the patient in the After Visit Summary.    Diagnoses and all orders for this visit:    1. Primary hypertension (Primary)    2. Mixed hyperlipidemia    3. Type 2 diabetes mellitus with hyperglycemia, without long-term current use of insulin  -     Hemoglobin A1c    4. Sigmoid diverticulosis    5. Gastroesophageal reflux disease without esophagitis    6. Stage 3a chronic kidney disease    7. Reactive depression    8. Routine general medical examination at a health care facility  -     CBC (No Diff)  -     Comprehensive Metabolic Panel  -     Lipid Panel  -     Hemoglobin A1c  -     TSH  -     Vitamin B12    9. Encounter for Medicare annual wellness exam  -     CBC (No Diff)  -     Comprehensive Metabolic Panel  -     Lipid Panel  -     Hemoglobin A1c  -     TSH  -     Vitamin B12        Follow Up:   Return in about 4 months (around 11/8/2025) for Recheck, with fasting labs.     An After Visit Summary and PPPS were given to the patient.         Historical Data        HME-counseled on diet and exercise, fasting labs today  htn-cont on CCB and Maxzide, goal of 130/80  hyperlipidemia-labs on Crestor at goal, counseled on diet  gerd-stable on omeprazole   glucose intolerance-labs improved and advised to watch carbs, AIC today   osteoporosis-DEXA from 7/20 dw  patient, on Boniva  xerosis-lachydrin prn, stable  DJD-tylenol prn and diclofenac gel  Diverticulosis-increase fiber  Heart murmur-noted ECHO  CKD-advised NSAIDs avoidance, adequate hydration, labs today stable  Depression-cont Cymbalta but take daily  Chronic back pain-per NS and ACL      7/8 labs noted and dw patient    Reviewed the following with the patient: advised patient to avoid alcoholic beverages, encouraged patient to exercise 5-7 days per week for 30 minutes at a time, ideal body weight discussed with patient, and weight loss encouraged.

## 2025-07-09 LAB — HBA1C MFR BLD: 6.4 % (ref 4.8–5.6)

## 2025-07-17 NOTE — PROGRESS NOTES
Subjective   Chief Complaint   Patient presents with    Menopause     Marleni Armando is a 80 y.o. year old  Medicare patient presenting to be seen in follow up regarding osteoporosis and vaginal atrophy follow up. She stopped her estrogen cream last year as she was not having any bothersome symptoms.   She is up to date on her colonoscopy with last exam 2022. Her last bone scan 2025 showed osteopenia- had previously had osteoporosis levels of bone loss she is followed by arthritis center for her osteoporosis and arthritis. Taking calcium and vit d   Spinal stenosis symptoms are some better. She ended up not having surgery.   Birads 1 mammogram 2025  This past year she has not been on hormone replacement therapy.  She has not had any vaginal bleeding in the last 12 months.   Menopausal symptoms are not present.    SEXUAL Hx:  She is not currently sexually active.  In the past year there she has not been sexually active.    Condoms are not needed because she is not sexually active.  She would not like to be screened for STD's at today's exam.  [place] is painful: not asked  HEALTH Hx:  She exercises regularly: yes. Does seated exercises at least 3 times week   She wears her seat belt: yes.  She has concerns about domestic violence: no.  She has noticed changes in height: no  OTHER THINGS SHE WANTS TO DISCUSS TODAY:  Nothing else    The following portions of the patient's history were reviewed and updated as appropriate:problem list, current medications, allergies, past family history, past medical history, past social history, and past surgical history.    Social History    Tobacco Use      Smoking status: Never        Passive exposure: Never      Smokeless tobacco: Never    Review of Systems  Constitutional POS: nothing reported    NEG: anorexia or night sweats   Genitourinary POS: at times has feeling of incomplete emptying     NEG: dysuria or hematuria   Gastointestinal POS: nothing reported     "NEG: bloating, change in bowel habits, melena, or reflux symptoms   Integument POS: nothing reported    NEG: moles that are changing in size, shape, color or rashes   Breast POS: nothing reported    NEG: persistent breast lump, skin dimpling, or nipple discharge        Objective   /70 (BP Location: Right arm, Patient Position: Sitting, Cuff Size: Adult)   Ht 152.4 cm (60\")   Wt 68 kg (150 lb)   LMP  (LMP Unknown)   BMI 29.29 kg/m²     General:  well developed; well nourished  no acute distress  mentation appropriate   Skin:  No suspicious lesions seen   Thyroid: normal to inspection and palpation   Breasts:  Examined in supine position  Symmetric without masses or skin dimpling  Nipples normal without inversion, lesions or discharge  There are no palpable axillary nodes   Abdomen: soft, non-tender; no masses  no umbilical or inguinal hernias are present  no hepato-splenomegaly   Pelvis: Clinical staff was present for exam  External genitalia:  normal appearance of the external genitalia including Bartholin's and Newdale's glands.  :  urethral meatus normal; urethra normal:  Vaginal:  atrophic mucosal changes are present;  Cervix:  absent.  Uterus:  absent.  Adnexa:  non palpable bilaterally.  Rectal:  digital rectal exam not performed; anus visually normal appearing.        Assessment   Osteoporosis-increase in bone density on most recent scan- followed by arthritis center   Menopausal female currently not on HRT - without significant symptoms affecting activities of daily living  She is up to date on all relevant gynecologic and colorectal screenings     Plan   Pap was not done today.  I explained to Marleni that the Pap smears are no longer recommended in patient's after 65 years of age.   I stressed to Marleni that she still should be seen to be seen yearly for a full physical including breast and pelvic exam.  She was encouraged to get yearly mammograms.  She should report any palpable breast lump(s) " or skin changes regardless of mammographic findings.  I explained to Marleni that notification regarding her mammogram results will come from the center performing the study.  Our office will not be routinely calling with mammogram results.  It is her responsibility to make sure that the results from the mammogram are communicated to her by the breast center.  If she has any questions about the results, she is welcome to call our office anytime.  Her vaccine record was reviewed and updated.  Follow up for annual exam 1 year    No orders of the defined types were placed in this encounter.         This note was electronically signed.  SOPHIA Logan  July 18, 2025    Part of this note may be an electronic transcription/translation of spoken language to printed text using the Dragon Dictation System.

## 2025-07-18 ENCOUNTER — OFFICE VISIT (OUTPATIENT)
Age: 80
End: 2025-07-18
Payer: MEDICARE

## 2025-07-18 VITALS
BODY MASS INDEX: 29.45 KG/M2 | WEIGHT: 150 LBS | SYSTOLIC BLOOD PRESSURE: 150 MMHG | DIASTOLIC BLOOD PRESSURE: 70 MMHG | HEIGHT: 60 IN

## 2025-07-18 DIAGNOSIS — M81.0 AGE-RELATED OSTEOPOROSIS WITHOUT CURRENT PATHOLOGICAL FRACTURE: Primary | ICD-10-CM

## 2025-07-18 DIAGNOSIS — N95.2 VAGINAL ATROPHY: ICD-10-CM

## 2025-07-18 RX ORDER — CLOTRIMAZOLE AND BETAMETHASONE DIPROPIONATE 10; .64 MG/G; MG/G
CREAM TOPICAL
COMMUNITY
Start: 2025-06-19 | End: 2025-07-18

## 2025-07-29 DIAGNOSIS — K21.9 GASTROESOPHAGEAL REFLUX DISEASE WITHOUT ESOPHAGITIS: ICD-10-CM

## 2025-07-29 RX ORDER — OMEPRAZOLE 40 MG/1
40 CAPSULE, DELAYED RELEASE ORAL EVERY MORNING
Qty: 90 CAPSULE | Refills: 3 | Status: SHIPPED | OUTPATIENT
Start: 2025-07-29

## 2025-08-27 ENCOUNTER — OFFICE VISIT (OUTPATIENT)
Age: 80
End: 2025-08-27
Payer: MEDICARE

## 2025-08-27 VITALS
BODY MASS INDEX: 28.47 KG/M2 | DIASTOLIC BLOOD PRESSURE: 76 MMHG | WEIGHT: 145 LBS | TEMPERATURE: 97.3 F | SYSTOLIC BLOOD PRESSURE: 128 MMHG | HEIGHT: 60 IN | HEART RATE: 63 BPM

## 2025-08-27 DIAGNOSIS — M81.0 AGE-RELATED OSTEOPOROSIS WITHOUT CURRENT PATHOLOGICAL FRACTURE: Primary | Chronic | ICD-10-CM

## 2025-08-27 DIAGNOSIS — N18.31 STAGE 3A CHRONIC KIDNEY DISEASE: ICD-10-CM
